# Patient Record
Sex: FEMALE | Race: WHITE | NOT HISPANIC OR LATINO | Employment: UNEMPLOYED | URBAN - NONMETROPOLITAN AREA
[De-identification: names, ages, dates, MRNs, and addresses within clinical notes are randomized per-mention and may not be internally consistent; named-entity substitution may affect disease eponyms.]

---

## 2017-01-06 NOTE — TELEPHONE ENCOUNTER
----- Message from Kristine Chin sent at 1/6/2017 10:52 AM EST -----  Contact: GERONIMO  PT IS SCHEDULED FOR ANNUAL NEXT WEEK, BUT NEEDS 1 REFILL OF PREMPRO 0.3MG-1.5MG TAB SENT TO [facility].  THIS IS DR CARCAMO'S PT.  THANKS      Routed to Dr Carcamo for approval.

## 2017-01-12 ENCOUNTER — OFFICE VISIT (OUTPATIENT)
Dept: OBSTETRICS AND GYNECOLOGY | Facility: CLINIC | Age: 72
End: 2017-01-12

## 2017-01-12 DIAGNOSIS — I48.19 PERSISTENT ATRIAL FIBRILLATION (HCC): ICD-10-CM

## 2017-01-12 DIAGNOSIS — R00.2 PALPITATIONS: ICD-10-CM

## 2017-02-01 ENCOUNTER — ANTICOAGULATION VISIT (OUTPATIENT)
Dept: CARDIOLOGY | Facility: CLINIC | Age: 72
End: 2017-02-01

## 2017-02-01 LAB — INR PPP: 3.3

## 2017-02-01 NOTE — PATIENT INSTRUCTIONS
Spoke with patient. Instructed to decrease dose to 5mg daily except 2.5mg on T/TH/Sat. Recheck INR in 2 weeks.

## 2017-02-09 ENCOUNTER — OFFICE VISIT (OUTPATIENT)
Dept: OBSTETRICS AND GYNECOLOGY | Facility: CLINIC | Age: 72
End: 2017-02-09

## 2017-02-09 VITALS
BODY MASS INDEX: 31.07 KG/M2 | SYSTOLIC BLOOD PRESSURE: 124 MMHG | WEIGHT: 205 LBS | DIASTOLIC BLOOD PRESSURE: 66 MMHG | HEIGHT: 68 IN

## 2017-02-09 DIAGNOSIS — N95.1 MENOPAUSAL SYMPTOMS: ICD-10-CM

## 2017-02-09 DIAGNOSIS — Z01.419 ENCOUNTER FOR GYNECOLOGICAL EXAMINATION WITHOUT ABNORMAL FINDING: ICD-10-CM

## 2017-02-09 DIAGNOSIS — Z12.31 ENCOUNTER FOR MAMMOGRAM TO ESTABLISH BASELINE MAMMOGRAM: Primary | ICD-10-CM

## 2017-02-09 DIAGNOSIS — N95.2 POSTMENOPAUSAL ATROPHIC VAGINITIS: ICD-10-CM

## 2017-02-09 PROCEDURE — G0101 CA SCREEN;PELVIC/BREAST EXAM: HCPCS | Performed by: OBSTETRICS & GYNECOLOGY

## 2017-02-09 PROCEDURE — 99212 OFFICE O/P EST SF 10 MIN: CPT | Performed by: OBSTETRICS & GYNECOLOGY

## 2017-02-09 RX ORDER — RANITIDINE 150 MG/1
TABLET ORAL
COMMUNITY
Start: 2017-01-23 | End: 2020-01-03

## 2017-02-09 RX ORDER — VENLAFAXINE 100 MG/1
TABLET ORAL 2 TIMES DAILY
COMMUNITY
Start: 2017-01-23 | End: 2020-03-11 | Stop reason: SDUPTHER

## 2017-02-09 RX ORDER — ONABOTULINUMTOXINA 100 [USP'U]/1
INJECTION, POWDER, LYOPHILIZED, FOR SOLUTION INTRADERMAL; INTRAMUSCULAR
COMMUNITY
Start: 2017-01-04 | End: 2020-01-03

## 2017-02-09 RX ORDER — ESTRADIOL 0.1 MG/G
CREAM VAGINAL
Qty: 1 EACH | Refills: 12 | Status: SHIPPED | OUTPATIENT
Start: 2017-02-09 | End: 2018-05-10 | Stop reason: SDUPTHER

## 2017-02-09 NOTE — PROGRESS NOTES
Subjective  Chief Complaint   Patient presents with   • Gynecologic Exam     Millie Richardson is a 71 y.o. year old  menopausal female presenting to be seen for her annual exam.  This past year she has been on hormone replacement therapy.  There has not been vaginal bleeding in the last 12 months.  Menopausal symptoms are not present.  Pt requesting refills of prempro and estrace cream.  Pt aware of risks/complications from HRT.    OTHER COMPLAINTS:  none    Routine Health Maintenance  Last Pap Smear:  10/2014  Last MM years ago  Last Dexa:  never  Last Colonoscopy:  ?    History  Past Medical History   Diagnosis Date   • Anemia    • Anxiety    • Atrial fibrillation    • Blepharospasm    • Depression    • Depression    • History of blood transfusion    • History of myocardial infarction    • History of pneumonia    • History of rheumatic fever    • Hypertension    • Hypothyroidism      recent TSH abnormal hyperthyroid (patient noncompliant with reduced dose of [place] Thyroid).   • Irritable bowel syndrome (IBS)    • Osteoporosis    • Ulcer      Current Outpatient Prescriptions on File Prior to Visit   Medication Sig Dispense Refill   • buPROPion XL (WELLBUTRIN XL) 300 MG 24 hr tablet Take 300 mg by mouth Daily.     • carvedilol (COREG) 25 MG tablet Take 1 tablet by mouth 2 (Two) Times a Day. 60 tablet 11   • estrogen, conjugated,-medroxyprogesterone (PREMPRO) 0.3-1.5 MG per tablet Take  by mouth Daily.     • lisinopril (PRINIVIL,ZESTRIL) 10 MG tablet Take 10 mg by mouth Daily.     • thyroid (ARMOUR THYROID) 300 MG tablet Take 300 mg by mouth Daily.     • warfarin (COUMADIN) 5 MG tablet Take 5 mg by mouth Daily. As directed     • [DISCONTINUED] estrogen, conjugated,-medroxyprogesterone (PREMPRO) 0.3-1.5 MG per tablet Take 1 tablet by mouth Daily. 30 tablet 0   • [DISCONTINUED] venlafaxine (EFFEXOR) 75 MG tablet Take 75 mg by mouth 2 (Two) Times a Day.       No current facility-administered medications on  "file prior to visit.      Allergies   Allergen Reactions   • Novocain [Procaine]    • Penicillins    • Sulfa Antibiotics      Past Surgical History   Procedure Laterality Date   • Cholecystectomy  2001     Family History   Problem Relation Age of Onset   • Colon cancer Father    • Hypertension Father    • Thyroid disease Mother    • Hypertension Mother      Social History     Social History   • Marital status:      Spouse name: N/A   • Number of children: N/A   • Years of education: N/A     Social History Main Topics   • Smoking status: Never Smoker   • Smokeless tobacco: None   • Alcohol use No   • Drug use: No   • Sexual activity: Defer     Other Topics Concern   • None     Social History Narrative       Review of Systems  All systems were reviewed and negative except for:  Constitution:  positive for trouble sleeping and hot flashes  Cardiovascular: positive for  palpitations  Musculoskeletal: positive for  joint pain  Behavioral/Psych: positive for  depression       Objective  Visit Vitals   • /66   • Ht 68\" (172.7 cm)   • Wt 205 lb (93 kg)   • LMP  (LMP Unknown)   • Breastfeeding No   • BMI 31.17 kg/m2     Physical Exam:  General Appearance: alert, appears stated age and cooperative  Head: normocephalic, without obvious abnormality and atraumatic  Eyes: lids and lashes normal, conjunctivae and sclerae normal, no icterus, no pallor and corneas clear  Neck: suppple, trachea midline and no thyromegaly  Breasts: Examined in supine position  Symmetric without masses or skin dimpling  Nipples normal without inversion, lesions or discharge  There are no palpable axillary nodes  Abdomen: normal bowel sounds, no masses, no hepatomegaly, no splenomegaly, soft non-tender, no guarding and no rebound tenderness  Pelvic: Clinical staff was present for exam  External genitalia:  normal appearance of the external genitalia including Bartholin's and Makena's glands.  :  urethral meatus normal; urethral " hypermobility is absent.  Vaginal:  normal pink mucosa without prolapse or lesions.  Cervix:  normal appearance.  Uterus:  normal size, shape and consistency.  Adnexa:  normal bimanual exam of the adnexa.  Extremities: moves extremities well, no edema, no cyanosis and no redness  Skin: no bleeding, bruising or rash and no leasions noted  Neurologic: Mental Status orientated to person, place, time and situation  Psych: normal       Assessment/Plan   Millie was seen today for gynecologic exam.    Diagnoses and all orders for this visit:    Encounter for mammogram to establish baseline mammogram  -     Mammo Screening Digital Tomosynthesis Bilateral With CAD; Future      Encounter for gynecological examination without abnormal finding    Pap IG, Rfx HPV ASCU  Menopausal symptoms    Pt desiring to cont HRT.  Pt aware of risks, complications.  Rx given as noted.        Follow up 1 year for annual exam         This note was electronically signed.  Rebekah Crowe M.D.  February 9, 2017

## 2017-02-09 NOTE — PATIENT INSTRUCTIONS
Mammogram  A mammogram is an X-ray of the breasts that is done to check for changes that are not normal. This test can screen for and find any changes that may suggest breast cancer. This test can also help to find other changes and variations in the breast.  BEFORE THE PROCEDURE  · Have this test done about 1-2 weeks after your period. This is usually when your breasts are the least tender.  · If you are visiting a new doctor or clinic, send any past mammogram images to your new doctor's office.  · Wash your breasts and under your arms the day of the test.  · Do not use deodorants, perfumes, lotions, or powders on the day of the test.  · Take off any jewelry from your neck.  · Wear clothes that you can change into and out of easily.  PROCEDURE  · You will undress from the waist up. You will put on a gown.  · You will  front of the X-ray machine.  · Each breast will be placed between two plastic or glass plates. The plates will press down on your breast for a few seconds. Try to stay as relaxed as possible. This does not cause any harm to your breasts. Any discomfort you feel will be very brief.  · X-rays will be taken from different angles of each breast.  The procedure may vary among doctors and hospitals.   AFTER THE PROCEDURE  · The mammogram will be looked at by a specialist (radiologist).  · You may need to do certain parts of the test again. This depends on the quality of the images.  · Ask when your test results will be ready. Make sure you get your test results.  · You may go back to your normal activities.     This information is not intended to replace advice given to you by your health care provider. Make sure you discuss any questions you have with your health care provider.     Document Released: 03/16/2010 Document Revised: 12/06/2012 Document Reviewed: 02/26/2016  Elsevier Interactive Patient Education ©2016 [employer]

## 2017-02-16 NOTE — TELEPHONE ENCOUNTER
----- Message from Kristine Chin sent at 2/16/2017 11:26 AM EST -----  Contact: PT  PT SAW DR CARCAMO AND GOT RX REFILLS, BUT THE PREMPRO WAS ONLY A 1 MONTH SUPPLY.  CAN WE SEND IN REFILLS FOR HER?  THANKS

## 2017-03-14 ENCOUNTER — ANTICOAGULATION VISIT (OUTPATIENT)
Dept: CARDIOLOGY | Facility: CLINIC | Age: 72
End: 2017-03-14

## 2017-03-14 ENCOUNTER — OFFICE VISIT (OUTPATIENT)
Dept: CARDIOLOGY | Facility: CLINIC | Age: 72
End: 2017-03-14

## 2017-03-14 VITALS
SYSTOLIC BLOOD PRESSURE: 132 MMHG | BODY MASS INDEX: 31.52 KG/M2 | HEIGHT: 68 IN | DIASTOLIC BLOOD PRESSURE: 72 MMHG | WEIGHT: 208 LBS | HEART RATE: 63 BPM

## 2017-03-14 DIAGNOSIS — I48.19 PERSISTENT ATRIAL FIBRILLATION (HCC): Primary | ICD-10-CM

## 2017-03-14 DIAGNOSIS — I10 ESSENTIAL HYPERTENSION: ICD-10-CM

## 2017-03-14 DIAGNOSIS — E03.9 HYPOTHYROIDISM, UNSPECIFIED TYPE: ICD-10-CM

## 2017-03-14 DIAGNOSIS — E05.80 IATROGENIC HYPERTHYROIDISM: ICD-10-CM

## 2017-03-14 LAB — INR PPP: 1.6 (ref 0.9–1.1)

## 2017-03-14 PROCEDURE — 85610 PROTHROMBIN TIME: CPT | Performed by: INTERNAL MEDICINE

## 2017-03-14 PROCEDURE — 99212 OFFICE O/P EST SF 10 MIN: CPT | Performed by: INTERNAL MEDICINE

## 2017-03-14 NOTE — PROGRESS NOTES
PCP:  Dr. Lou Gonzalez MD      Chief Complaint: Persistent Afib    History of Present Illness:    The stable established chief complaint is being appropriately managed with the current medical regimen, is minimal to asymptomatic with treatment, and random in occurrence when happening.    Problem   Iatrogenic Hyperthyroidism   Atrial Fibrillation    a. Diagnosed in 1999, initially treated with Betapace, chronic Coumadin therapy.  b. Status post successful external cardioversion September 2003.    c. Echocardiogram, 08/27/2003:  Mild concentric LVH, LA 4.6, normal EF.  d. Holter monitor, 10/10/2003:  Heart rate 60 to 124 beats per minute, average 78 beats per minute with PACs, PVCs, and sinus rhythm.  e. Successful external cardioversion after Tikosyn initiation, 03/31/2004.  f. Recent persistent atrial fibrillation/documented per hospital visit at [facility] November 2015.   g. 02/23/2016: EKG reveals atrial fibrillation with heart rate 102 beats per minute.-  h. Holter 2/17 average heart rate 90 with chronic afib and occasional pvc's       Hypothyroidism    recent TSH abnormal hyperthyroid (patient noncompliant with reduced dose of [place] Thyroid).   Iatrogenic hyperthroidism            Current Outpatient Prescriptions:   •  BOTOX 100 UNITS reconstituted solution injection, , Disp: , Rfl:   •  buPROPion XL (WELLBUTRIN XL) 300 MG 24 hr tablet, Take 300 mg by mouth Daily., Disp: , Rfl:   •  carvedilol (COREG) 25 MG tablet, Take 1 tablet by mouth 2 (Two) Times a Day., Disp: 60 tablet, Rfl: 11  •  estradiol (ESTRACE VAGINAL) 0.1 MG/GM vaginal cream, Insert 1 gm intravaginally 1-3 times each week, Disp: 1 each, Rfl: 12  •  estrogen, conjugated,-medroxyprogesterone (PREMPRO) 0.3-1.5 MG per tablet, Take  by mouth Daily., Disp: , Rfl:   •  estrogen, conjugated,-medroxyprogesterone (PREMPRO) 0.3-1.5 MG per tablet, Take 1 tablet by mouth Daily., Disp: 30 tablet, Rfl: 11  •  lisinopril (PRINIVIL,ZESTRIL) 10 MG tablet,  "Take 10 mg by mouth Daily., Disp: , Rfl:   •  raNITIdine (ZANTAC) 150 MG tablet, , Disp: , Rfl:   •  thyroid (ARMOUR THYROID) 300 MG tablet, Take 300 mg by mouth Daily., Disp: , Rfl:   •  venlafaxine (EFFEXOR) 100 MG tablet, , Disp: , Rfl:   •  warfarin (COUMADIN) 5 MG tablet, Take 5 mg by mouth Daily. As directed, Disp: , Rfl:    Allergies   Allergen Reactions   • Novocain [Procaine]    • Penicillins    • Sulfa Antibiotics         ROS:    Denies chest pain, tightness, palpitations, KEYS, PND, or edema      Visit Vitals   • /72 (BP Location: Right arm, Patient Position: Sitting)   • Pulse 63   • Ht 68\" (172.7 cm)   • Wt 208 lb (94.3 kg)   • LMP  (LMP Unknown)   • BMI 31.63 kg/m2        Physical Exam:    Lungs: CTA, no wheezing, equal air entry bilaterally, resonant to percussion  Cor: IRIR, physiologic S1, S2, no rubs, gallops, murmurs or thrills  Ext: warm, negative edema    Procedures      Diagnosis Plan   1. Persistent atrial fibrillation  The patient feels overall she is doing well in current dose of rate control medications and her 24 Holter monitor suggest appropriate rate control of Afib with average heart rate 90's.  Discussed with patient how iatrogenic hyperthyroidism may affect ability to control heart rate in setting of Afib. However, she feels worse with lower dose of [place] thyroid and is reluctant to change current dose.  She will follow up in one year or sooner as needed.     2. Essential hypertension     3. Hypothyroidism, unspecified type     4. Iatrogenic hyperthyroidism      Will Alejandro lopesibe for Dr. Davis  "

## 2017-03-29 ENCOUNTER — ANTICOAGULATION VISIT (OUTPATIENT)
Dept: CARDIOLOGY | Facility: CLINIC | Age: 72
End: 2017-03-29

## 2017-03-29 LAB — INR PPP: 3.3

## 2017-04-13 ENCOUNTER — ANTICOAGULATION VISIT (OUTPATIENT)
Dept: CARDIOLOGY | Facility: CLINIC | Age: 72
End: 2017-04-13

## 2017-04-13 LAB — INR PPP: 1.6

## 2017-04-19 DIAGNOSIS — I48.19 PERSISTENT ATRIAL FIBRILLATION (HCC): Primary | ICD-10-CM

## 2017-04-21 ENCOUNTER — RESULTS ENCOUNTER (OUTPATIENT)
Dept: CARDIOLOGY | Facility: CLINIC | Age: 72
End: 2017-04-21

## 2017-04-21 DIAGNOSIS — I48.19 PERSISTENT ATRIAL FIBRILLATION (HCC): ICD-10-CM

## 2017-04-28 ENCOUNTER — RESULTS ENCOUNTER (OUTPATIENT)
Dept: CARDIOLOGY | Facility: CLINIC | Age: 72
End: 2017-04-28

## 2017-04-28 DIAGNOSIS — I48.19 PERSISTENT ATRIAL FIBRILLATION (HCC): ICD-10-CM

## 2017-05-05 ENCOUNTER — RESULTS ENCOUNTER (OUTPATIENT)
Dept: CARDIOLOGY | Facility: CLINIC | Age: 72
End: 2017-05-05

## 2017-05-05 DIAGNOSIS — I48.19 PERSISTENT ATRIAL FIBRILLATION (HCC): ICD-10-CM

## 2017-05-12 ENCOUNTER — RESULTS ENCOUNTER (OUTPATIENT)
Dept: CARDIOLOGY | Facility: CLINIC | Age: 72
End: 2017-05-12

## 2017-05-12 ENCOUNTER — ANTICOAGULATION VISIT (OUTPATIENT)
Dept: CARDIOLOGY | Facility: CLINIC | Age: 72
End: 2017-05-12

## 2017-05-12 DIAGNOSIS — I48.19 PERSISTENT ATRIAL FIBRILLATION (HCC): ICD-10-CM

## 2017-05-12 LAB — INR PPP: 2.1

## 2017-05-19 ENCOUNTER — RESULTS ENCOUNTER (OUTPATIENT)
Dept: CARDIOLOGY | Facility: CLINIC | Age: 72
End: 2017-05-19

## 2017-05-19 DIAGNOSIS — I48.19 PERSISTENT ATRIAL FIBRILLATION (HCC): ICD-10-CM

## 2017-05-26 ENCOUNTER — RESULTS ENCOUNTER (OUTPATIENT)
Dept: CARDIOLOGY | Facility: CLINIC | Age: 72
End: 2017-05-26

## 2017-05-26 DIAGNOSIS — I48.19 PERSISTENT ATRIAL FIBRILLATION (HCC): ICD-10-CM

## 2017-06-02 ENCOUNTER — RESULTS ENCOUNTER (OUTPATIENT)
Dept: CARDIOLOGY | Facility: CLINIC | Age: 72
End: 2017-06-02

## 2017-06-02 DIAGNOSIS — I48.19 PERSISTENT ATRIAL FIBRILLATION (HCC): ICD-10-CM

## 2017-06-09 ENCOUNTER — ANTICOAGULATION VISIT (OUTPATIENT)
Dept: CARDIOLOGY | Facility: CLINIC | Age: 72
End: 2017-06-09

## 2017-06-09 ENCOUNTER — RESULTS ENCOUNTER (OUTPATIENT)
Dept: CARDIOLOGY | Facility: CLINIC | Age: 72
End: 2017-06-09

## 2017-06-09 DIAGNOSIS — I48.19 PERSISTENT ATRIAL FIBRILLATION (HCC): ICD-10-CM

## 2017-06-09 LAB — INR PPP: 3.6

## 2017-06-16 ENCOUNTER — ANTICOAGULATION VISIT (OUTPATIENT)
Dept: CARDIOLOGY | Facility: CLINIC | Age: 72
End: 2017-06-16

## 2017-06-16 ENCOUNTER — RESULTS ENCOUNTER (OUTPATIENT)
Dept: CARDIOLOGY | Facility: CLINIC | Age: 72
End: 2017-06-16

## 2017-06-16 DIAGNOSIS — I48.19 PERSISTENT ATRIAL FIBRILLATION (HCC): ICD-10-CM

## 2017-06-16 LAB — INR PPP: 3.7

## 2017-06-16 NOTE — PATIENT INSTRUCTIONS
LM on VM with new dose instructions- I asked that she call back Monday and let us know what dose she has been taking, just so we can be sure that she made the changes after last check- I tried to call several times throughout the day but finally had to leave a msg at [phone] - will await her return phone call Monday

## 2017-06-23 ENCOUNTER — RESULTS ENCOUNTER (OUTPATIENT)
Dept: CARDIOLOGY | Facility: CLINIC | Age: 72
End: 2017-06-23

## 2017-06-23 DIAGNOSIS — I48.19 PERSISTENT ATRIAL FIBRILLATION (HCC): ICD-10-CM

## 2017-06-29 NOTE — PROGRESS NOTES
I have reviewed the anticoagulation track, labs, and dose adjustments made by Janny Reinoso and I agree.    Electronically signed by Rachna Moralez PA-C 06/29/17 9:05 AM

## 2017-06-30 ENCOUNTER — RESULTS ENCOUNTER (OUTPATIENT)
Dept: CARDIOLOGY | Facility: CLINIC | Age: 72
End: 2017-06-30

## 2017-06-30 DIAGNOSIS — I48.19 PERSISTENT ATRIAL FIBRILLATION (HCC): ICD-10-CM

## 2017-07-05 ENCOUNTER — ANTICOAGULATION VISIT (OUTPATIENT)
Dept: CARDIOLOGY | Facility: CLINIC | Age: 72
End: 2017-07-05

## 2017-07-05 LAB — INR PPP: 1.3

## 2017-07-07 ENCOUNTER — RESULTS ENCOUNTER (OUTPATIENT)
Dept: CARDIOLOGY | Facility: CLINIC | Age: 72
End: 2017-07-07

## 2017-07-07 DIAGNOSIS — I48.19 PERSISTENT ATRIAL FIBRILLATION (HCC): ICD-10-CM

## 2017-07-14 ENCOUNTER — RESULTS ENCOUNTER (OUTPATIENT)
Dept: CARDIOLOGY | Facility: CLINIC | Age: 72
End: 2017-07-14

## 2017-07-14 ENCOUNTER — ANTICOAGULATION VISIT (OUTPATIENT)
Dept: CARDIOLOGY | Facility: CLINIC | Age: 72
End: 2017-07-14

## 2017-07-14 DIAGNOSIS — I48.19 PERSISTENT ATRIAL FIBRILLATION (HCC): ICD-10-CM

## 2017-07-14 LAB — INR PPP: 1.9

## 2017-07-21 ENCOUNTER — RESULTS ENCOUNTER (OUTPATIENT)
Dept: CARDIOLOGY | Facility: CLINIC | Age: 72
End: 2017-07-21

## 2017-07-21 DIAGNOSIS — I48.19 PERSISTENT ATRIAL FIBRILLATION (HCC): ICD-10-CM

## 2017-07-28 ENCOUNTER — RESULTS ENCOUNTER (OUTPATIENT)
Dept: CARDIOLOGY | Facility: CLINIC | Age: 72
End: 2017-07-28

## 2017-07-28 ENCOUNTER — ANTICOAGULATION VISIT (OUTPATIENT)
Dept: CARDIOLOGY | Facility: CLINIC | Age: 72
End: 2017-07-28

## 2017-07-28 DIAGNOSIS — I48.19 PERSISTENT ATRIAL FIBRILLATION (HCC): ICD-10-CM

## 2017-07-28 LAB — INR PPP: 2.3

## 2017-08-04 ENCOUNTER — RESULTS ENCOUNTER (OUTPATIENT)
Dept: CARDIOLOGY | Facility: CLINIC | Age: 72
End: 2017-08-04

## 2017-08-04 DIAGNOSIS — I48.19 PERSISTENT ATRIAL FIBRILLATION (HCC): ICD-10-CM

## 2017-08-11 ENCOUNTER — RESULTS ENCOUNTER (OUTPATIENT)
Dept: CARDIOLOGY | Facility: CLINIC | Age: 72
End: 2017-08-11

## 2017-08-11 DIAGNOSIS — I48.19 PERSISTENT ATRIAL FIBRILLATION (HCC): ICD-10-CM

## 2017-08-18 ENCOUNTER — ANTICOAGULATION VISIT (OUTPATIENT)
Dept: CARDIOLOGY | Facility: CLINIC | Age: 72
End: 2017-08-18

## 2017-08-18 ENCOUNTER — RESULTS ENCOUNTER (OUTPATIENT)
Dept: CARDIOLOGY | Facility: CLINIC | Age: 72
End: 2017-08-18

## 2017-08-18 DIAGNOSIS — I48.19 PERSISTENT ATRIAL FIBRILLATION (HCC): ICD-10-CM

## 2017-08-18 LAB — INR PPP: 2

## 2017-08-18 NOTE — PATIENT INSTRUCTIONS
To recheck 4 weeks with no change.   Patient says that she is going to start having her PCP regulate her Warfarin.   AH

## 2017-08-25 ENCOUNTER — RESULTS ENCOUNTER (OUTPATIENT)
Dept: CARDIOLOGY | Facility: CLINIC | Age: 72
End: 2017-08-25

## 2017-08-25 DIAGNOSIS — I48.19 PERSISTENT ATRIAL FIBRILLATION (HCC): ICD-10-CM

## 2017-09-01 ENCOUNTER — RESULTS ENCOUNTER (OUTPATIENT)
Dept: CARDIOLOGY | Facility: CLINIC | Age: 72
End: 2017-09-01

## 2017-09-01 DIAGNOSIS — I48.19 PERSISTENT ATRIAL FIBRILLATION (HCC): ICD-10-CM

## 2017-09-08 ENCOUNTER — RESULTS ENCOUNTER (OUTPATIENT)
Dept: CARDIOLOGY | Facility: CLINIC | Age: 72
End: 2017-09-08

## 2017-09-08 DIAGNOSIS — I48.19 PERSISTENT ATRIAL FIBRILLATION (HCC): ICD-10-CM

## 2017-09-15 ENCOUNTER — RESULTS ENCOUNTER (OUTPATIENT)
Dept: CARDIOLOGY | Facility: CLINIC | Age: 72
End: 2017-09-15

## 2017-09-15 DIAGNOSIS — I48.19 PERSISTENT ATRIAL FIBRILLATION (HCC): ICD-10-CM

## 2018-03-26 ENCOUNTER — TELEPHONE (OUTPATIENT)
Dept: OBSTETRICS AND GYNECOLOGY | Facility: CLINIC | Age: 73
End: 2018-03-26

## 2018-03-26 NOTE — TELEPHONE ENCOUNTER
----- Message from Kristine Chin sent at 3/26/2018  2:09 PM EDT -----  Contact: PT  THIS IS DR CARCAMO'S PT.  SHE IS SCHEDULED FOR YEARLY F/U IN MAY.  PLEASE SEND 2 REFILLS OF PREMPRO 0.3-1.5MG DAILY TO [facility].  THANKS

## 2018-05-09 ENCOUNTER — HOSPITAL ENCOUNTER (EMERGENCY)
Facility: HOSPITAL | Age: 73
Discharge: HOME | End: 2018-05-09
Attending: EMERGENCY MEDICINE | Admitting: EMERGENCY MEDICINE

## 2018-05-09 VITALS
WEIGHT: 200 LBS | OXYGEN SATURATION: 97 % | SYSTOLIC BLOOD PRESSURE: 180 MMHG | HEART RATE: 102 BPM | DIASTOLIC BLOOD PRESSURE: 113 MMHG | RESPIRATION RATE: 20 BRPM | TEMPERATURE: 98.5 F | BODY MASS INDEX: 34.15 KG/M2 | HEIGHT: 64 IN

## 2018-05-09 DIAGNOSIS — B02.9 HERPES ZOSTER WITHOUT COMPLICATION: Primary | ICD-10-CM

## 2018-05-09 LAB
ALBUMIN SERPL-MCNC: 4.2 G/DL (ref 3.5–5)
ALBUMIN/GLOB SERPL: 1.3 G/DL (ref 1–2)
ALP SERPL-CCNC: 119 U/L (ref 38–126)
ALT SERPL W P-5'-P-CCNC: 44 U/L (ref 13–69)
ANION GAP SERPL CALCULATED.3IONS-SCNC: 16.6 MMOL/L (ref 10–20)
AST SERPL-CCNC: 25 U/L (ref 15–46)
BASOPHILS # BLD AUTO: 0.02 10*3/MM3 (ref 0–0.2)
BASOPHILS NFR BLD AUTO: 0.3 % (ref 0–2.5)
BILIRUB SERPL-MCNC: 0.7 MG/DL (ref 0.2–1.3)
BUN BLD-MCNC: 10 MG/DL (ref 7–20)
BUN/CREAT SERPL: 25 (ref 7.1–23.5)
CALCIUM SPEC-SCNC: 9.3 MG/DL (ref 8.4–10.2)
CHLORIDE SERPL-SCNC: 102 MMOL/L (ref 98–107)
CO2 SERPL-SCNC: 26 MMOL/L (ref 26–30)
CREAT BLD-MCNC: 0.4 MG/DL (ref 0.6–1.3)
DEPRECATED RDW RBC AUTO: 44.8 FL (ref 37–54)
EOSINOPHIL # BLD AUTO: 0.06 10*3/MM3 (ref 0–0.7)
EOSINOPHIL NFR BLD AUTO: 0.8 % (ref 0–7)
ERYTHROCYTE [DISTWIDTH] IN BLOOD BY AUTOMATED COUNT: 13.6 % (ref 11.5–14.5)
GFR SERPL CREATININE-BSD FRML MDRD: >150 ML/MIN/1.73
GLOBULIN UR ELPH-MCNC: 3.2 GM/DL
GLUCOSE BLD-MCNC: 112 MG/DL (ref 74–98)
HCT VFR BLD AUTO: 42.7 % (ref 37–47)
HGB BLD-MCNC: 14.2 G/DL (ref 12–16)
IMM GRANULOCYTES # BLD: 0.02 10*3/MM3 (ref 0–0.06)
IMM GRANULOCYTES NFR BLD: 0.3 % (ref 0–0.6)
INR PPP: 1.63 (ref 0.9–1.1)
LYMPHOCYTES # BLD AUTO: 1.93 10*3/MM3 (ref 0.6–3.4)
LYMPHOCYTES NFR BLD AUTO: 27 % (ref 10–50)
MCH RBC QN AUTO: 30.2 PG (ref 27–31)
MCHC RBC AUTO-ENTMCNC: 33.3 G/DL (ref 30–37)
MCV RBC AUTO: 90.9 FL (ref 81–99)
MONOCYTES # BLD AUTO: 0.68 10*3/MM3 (ref 0–0.9)
MONOCYTES NFR BLD AUTO: 9.5 % (ref 0–12)
NEUTROPHILS # BLD AUTO: 4.43 10*3/MM3 (ref 2–6.9)
NEUTROPHILS NFR BLD AUTO: 62.1 % (ref 37–80)
NRBC BLD MANUAL-RTO: 0 /100 WBC (ref 0–0)
PLATELET # BLD AUTO: 251 10*3/MM3 (ref 130–400)
PMV BLD AUTO: 10.3 FL (ref 6–12)
POTASSIUM BLD-SCNC: 3.6 MMOL/L (ref 3.5–5.1)
PROT SERPL-MCNC: 7.4 G/DL (ref 6.3–8.2)
PROTHROMBIN TIME: 18 SECONDS (ref 9.3–12.1)
RBC # BLD AUTO: 4.7 10*6/MM3 (ref 4.2–5.4)
SODIUM BLD-SCNC: 141 MMOL/L (ref 137–145)
TROPONIN I SERPL-MCNC: 0.02 NG/ML (ref 0–0.03)
WBC NRBC COR # BLD: 7.14 10*3/MM3 (ref 4.8–10.8)

## 2018-05-09 PROCEDURE — 80053 COMPREHEN METABOLIC PANEL: CPT | Performed by: PHYSICIAN ASSISTANT

## 2018-05-09 PROCEDURE — 99283 EMERGENCY DEPT VISIT LOW MDM: CPT

## 2018-05-09 PROCEDURE — 84484 ASSAY OF TROPONIN QUANT: CPT | Performed by: PHYSICIAN ASSISTANT

## 2018-05-09 PROCEDURE — 85025 COMPLETE CBC W/AUTO DIFF WBC: CPT | Performed by: PHYSICIAN ASSISTANT

## 2018-05-09 PROCEDURE — 93005 ELECTROCARDIOGRAM TRACING: CPT | Performed by: PHYSICIAN ASSISTANT

## 2018-05-09 PROCEDURE — 85610 PROTHROMBIN TIME: CPT | Performed by: PHYSICIAN ASSISTANT

## 2018-05-09 RX ORDER — LIDOCAINE 50 MG/G
1 PATCH TOPICAL EVERY 24 HOURS
Qty: 6 PATCH | Refills: 0 | Status: SHIPPED | OUTPATIENT
Start: 2018-05-09 | End: 2020-01-03

## 2018-05-09 NOTE — ED PROVIDER NOTES
Subjective   72-year-old female was brought in initially as a possible STEMI, EKG was reviewed by cardiologist Dr. Mcdaniel STEMI was canceled.  She states that she has pain on the right side of her abdomen and has a rash that is been present for 1 month she was diagnosed with shingles.  She states that she had sharp pain that came on earlier today, she states that she had symptoms are heart racing and felt short of breath but that has subsided.  This all started from the sharp pain that she had on the right side of her abdomen where she has the shingles rash        History provided by:  Patient   used: No        Review of Systems   Cardiovascular: Positive for palpitations.   Skin: Positive for rash.   All other systems reviewed and are negative.      Past Medical History:   Diagnosis Date   • Anemia    • Anxiety    • Atrial fibrillation    • Blepharospasm    • Depression    • Depression    • History of blood transfusion    • History of myocardial infarction 1999   • History of pneumonia    • History of rheumatic fever    • Hypertension    • Hypothyroidism     recent TSH abnormal hyperthyroid (patient noncompliant with reduced dose of [place] Thyroid).   • Irritable bowel syndrome (IBS)    • Osteoporosis    • Ulcer        Allergies   Allergen Reactions   • Novocain [Procaine]    • Penicillins    • Sulfa Antibiotics        Past Surgical History:   Procedure Laterality Date   • CHOLECYSTECTOMY  2001       Family History   Problem Relation Age of Onset   • Colon cancer Father    • Hypertension Father    • Thyroid disease Mother    • Hypertension Mother        Social History     Social History   • Marital status:      Social History Main Topics   • Smoking status: Never Smoker   • Alcohol use No   • Drug use: No   • Sexual activity: Defer     Other Topics Concern   • Not on file           Objective   Physical Exam   Constitutional: She is oriented to person, place, and time. She appears  well-developed and well-nourished.   Neck: Normal range of motion. Neck supple.   Cardiovascular: Normal rate and regular rhythm.    Pulmonary/Chest: Effort normal and breath sounds normal.   Abdominal: Soft. Bowel sounds are normal.   Musculoskeletal: Normal range of motion.   Neurological: She is alert and oriented to person, place, and time. She has normal reflexes.   Skin: Skin is warm and dry.   Rash on right upper abdomen, erythematous with dried vesicles   Psychiatric: She has a normal mood and affect.   Nursing note and vitals reviewed.      Procedures           ED Course  ED Course   Comment By Time   Patient continues support pain is along the rash on her chest at the site of shingles Williams Staton Jr., PA-C 05/09 1935                  [facility]      Final diagnoses:   Herpes zoster without complication            Williams Staton Jr., PA-C  05/09/18 1936

## 2018-05-10 ENCOUNTER — TELEPHONE (OUTPATIENT)
Dept: OBSTETRICS AND GYNECOLOGY | Facility: CLINIC | Age: 73
End: 2018-05-10

## 2018-05-10 RX ORDER — ESTRADIOL 0.1 MG/G
CREAM VAGINAL
Qty: 1 EACH | Refills: 0 | Status: SHIPPED | OUTPATIENT
Start: 2018-05-10 | End: 2020-01-03

## 2018-05-10 NOTE — TELEPHONE ENCOUNTER
----- Message from Kristine Chin sent at 5/10/2018  2:52 PM EDT -----  Contact: PT  THIS IS DR CARCAMO'S PT.  SHE IS SCHEDULED FOR APPT ON 5/30/18.  PLEASE SEND REFILL OF PREMARIN CREAM TO [facility].  THANKS

## 2018-05-11 ENCOUNTER — TELEPHONE (OUTPATIENT)
Dept: OBSTETRICS AND GYNECOLOGY | Facility: CLINIC | Age: 73
End: 2018-05-11

## 2018-05-11 NOTE — TELEPHONE ENCOUNTER
----- Message from Kristine Chin sent at 5/11/2018  2:17 PM EDT -----  Contact: PT  THIS IS DR CARCAMO'S PT.  SHE CALLED NEEDING REFILL ON PREMPRO .3MG TABLET.  SHE USES [facility].  THANKS

## 2018-06-14 ENCOUNTER — OFFICE VISIT (OUTPATIENT)
Dept: OBSTETRICS AND GYNECOLOGY | Facility: CLINIC | Age: 73
End: 2018-06-14

## 2018-06-14 VITALS
HEIGHT: 64 IN | SYSTOLIC BLOOD PRESSURE: 138 MMHG | DIASTOLIC BLOOD PRESSURE: 76 MMHG | WEIGHT: 197 LBS | BODY MASS INDEX: 33.63 KG/M2

## 2018-06-14 DIAGNOSIS — Z12.31 ENCOUNTER FOR SCREENING MAMMOGRAM FOR BREAST CANCER: Primary | ICD-10-CM

## 2018-06-14 DIAGNOSIS — N95.1 MENOPAUSAL SYMPTOMS: ICD-10-CM

## 2018-06-14 PROCEDURE — 99213 OFFICE O/P EST LOW 20 MIN: CPT | Performed by: OBSTETRICS & GYNECOLOGY

## 2018-06-14 NOTE — PROGRESS NOTES
Subjective  Chief Complaint   Patient presents with   • Gynecologic Exam     DOESN'T WANT EXAM TODAY, NEEDS REFILL ON PREMPRO.      Patient is 72 y.o.  here for f/u HRT.  Pt has continued on prempro low.  Pt denies any vaginal bleeding.  Pt reports doing well as long as she takes medication.  Pt reports having menopausal symptoms if she misses pill.  Pt has appt with Dr. Buckner at  Cardiology.  Pt is on an antiplatelet now; off coumadin secondary to afib.  Pt has never had colonoscopy.  Pt does do hemoccult stools usually yearly.  Pt unsure regarding last MMG but thinks she had it done last year.    History  Past Medical History:   Diagnosis Date   • Anemia    • Anxiety    • Atrial fibrillation    • Blepharospasm    • Depression    • Depression    • History of blood transfusion    • History of myocardial infarction    • History of pneumonia    • History of rheumatic fever    • Hypertension    • Hypothyroidism     recent TSH abnormal hyperthyroid (patient noncompliant with reduced dose of [place] Thyroid).   • Irritable bowel syndrome (IBS)    • Osteoporosis    • Ulcer      Current Outpatient Prescriptions on File Prior to Visit   Medication Sig Dispense Refill   • BOTOX 100 UNITS reconstituted solution injection      • buPROPion XL (WELLBUTRIN XL) 300 MG 24 hr tablet Take 300 mg by mouth Daily.     • carvedilol (COREG) 25 MG tablet Take 1 tablet by mouth 2 (Two) Times a Day. 60 tablet 11   • estradiol (ESTRACE VAGINAL) 0.1 MG/GM vaginal cream Insert 1 gm intravaginally 1-3 times each week 1 each 0   • lidocaine (LIDODERM) 5 % Place 1 patch on the skin Daily. Remove & Discard patch within 12 hours or as directed by MD 6 patch 0   • lisinopril (PRINIVIL,ZESTRIL) 10 MG tablet Take 10 mg by mouth Daily.     • raNITIdine (ZANTAC) 150 MG tablet      • thyroid (ARMOUR THYROID) 300 MG tablet Take 300 mg by mouth Daily.     • venlafaxine (EFFEXOR) 100 MG tablet      • [DISCONTINUED] estrogen,  "conjugated,-medroxyprogesterone (PREMPRO) 0.3-1.5 MG per tablet Take 1 tablet by mouth Daily. 30 tablet 0   • [DISCONTINUED] warfarin (COUMADIN) 5 MG tablet Take 5 mg by mouth Daily. As directed       No current facility-administered medications on file prior to visit.      Allergies   Allergen Reactions   • Novocain [Procaine]    • Penicillins    • Sulfa Antibiotics      Past Surgical History:   Procedure Laterality Date   • CHOLECYSTECTOMY  2001     Family History   Problem Relation Age of Onset   • Colon cancer Father    • Hypertension Father    • Thyroid disease Mother    • Hypertension Mother      Social History     Social History   • Marital status:      Social History Main Topics   • Smoking status: Never Smoker   • Smokeless tobacco: Never Used   • Alcohol use No   • Drug use: No   • Sexual activity: Defer     Other Topics Concern   • Not on file     Review of Systems  The following systems were reviewed and negative:  constitution, eyes, ENT, respiratory, cardiovascular, gastrointestinal, genitourinary, integument, breast, hematologic / lymphatic, musculoskeletal, neurological, behavioral/psych, endocrine and allergies / immunologic     Objective  Vitals:    06/14/18 1441   BP: 138/76   Weight: 89.4 kg (197 lb)   Height: 162.6 cm (64\")     Physical Exam:  General Appearance: alert, appears stated age and cooperative  Head: normocephalic, without obvious abnormality and atraumatic  Eyes: lids and lashes normal, conjunctivae and sclerae normal, no icterus, no pallor, corneas clear and PERRLA  Ears: ears appear intact with no abnormalities noted  Nose: nares normal, septum midline, mucosa normal and no drainage  Neck: suppple, trachea midline and no thyromegaly  Lungs: clear to auscultation, respirations regular, respirations even and respirations unlabored  Heart: regular rhythm and normal rate, normal S1, S2, no murmur, gallop, or rubs and no click  Breasts: Not performed.  Abdomen: normal bowel " sounds, no masses, no hepatomegaly, no splenomegaly, soft non-tender, no guarding and no rebound tenderness  Pelvic: Not performed.  Extremities: moves extremities well, no edema, no cyanosis and no redness  Skin: no bleeding, bruising or rash and no lesions noted  Lymph Nodes: no palpable adenopathy  Neuro: CN II-X grossly intact; sensation intact  Psych: normal mood and affect, oriented to person, time and place, thought content organized and appropriate judgment  Lab Review   No data reviewed    Imaging   Mammogram report    Assessment/Plan  Problem List Items Addressed This Visit     None      Visit Diagnoses     Encounter for screening mammogram for breast cancer    -  Primary  It is recommended per ACOG, for women at average risk to start annual mammogram screening at the age of 40 until the age of 75 and an individualized decision be made for women after age 75.  She was encouraged to continue getting yearly mammograms.  She should report any palpable breast lump(s) or skin changes regardless of mammographic findings.  I explained to Millie that notification regarding her mammogram results will come from the center performing the study.  Our office will not be routinely calling with mammogram results.  It is her responsibility to make sure that the results from the mammogram are communicated to her by the breast center.  If she has any questions about the results, she is welcome to call our office anytime.  Pt informed regarding increased risk of breast cancer per the WHI study with prempro.  Pt encouraged to get MMG; pt voices understanding.  Order given.    Relevant Orders    Mammo Screening Digital Tomosynthesis Bilateral With CAD    Menopausal symptoms      The various options for the management of menopausal symptoms was discussed.  The medical treatment options discussed include HRT, SSRIs, SSNRIs, clonidine, and gabapentin.  The risks and benefits were discussed including the findings from the WHI study.   The increased risk of breast cancer, CAD, stroke, and VTE events were discussed for combination therapy vs the increased risk of CV events and breast cancer not being seen in the estrogen only group.  The lowest effective dose for the shortest duration of treatment was discussed in regards to HRT.  Other alternatives including otc supplements and lifestyle changes were also discussed.  Local estrogen therapy to relieve atrophic vaginal symptoms was discussed was well as other alternatives.  Rx given as noted.  Pt also to discuss with cardiologist.  Instructions and precautions given.    Relevant Medications    estrogen, conjugated,-medroxyprogesterone (PREMPRO) 0.3-1.5 MG per tablet        Follow up 1 year or prn   This note was electronically signed.  Rebekah Crowe M.D.

## 2019-06-19 DIAGNOSIS — N95.1 MENOPAUSAL SYMPTOMS: ICD-10-CM

## 2019-06-19 RX ORDER — ESTROGEN,CON/M-PROGEST ACET 0.3-1.5MG
TABLET ORAL
Qty: 90 TABLET | Refills: 0 | Status: SHIPPED | OUTPATIENT
Start: 2019-06-19 | End: 2019-08-12 | Stop reason: SDUPTHER

## 2019-06-28 ENCOUNTER — OFFICE VISIT (OUTPATIENT)
Dept: PULMONOLOGY | Facility: CLINIC | Age: 74
End: 2019-06-28

## 2019-06-28 VITALS
OXYGEN SATURATION: 98 % | BODY MASS INDEX: 27.58 KG/M2 | RESPIRATION RATE: 16 BRPM | SYSTOLIC BLOOD PRESSURE: 118 MMHG | DIASTOLIC BLOOD PRESSURE: 82 MMHG | HEIGHT: 68 IN | WEIGHT: 182 LBS | HEART RATE: 72 BPM

## 2019-06-28 DIAGNOSIS — R06.83 SNORING: ICD-10-CM

## 2019-06-28 DIAGNOSIS — G47.19 EXCESSIVE DAYTIME SLEEPINESS: ICD-10-CM

## 2019-06-28 DIAGNOSIS — F51.5 NIGHTMARE DISORDER: ICD-10-CM

## 2019-06-28 DIAGNOSIS — R06.02 SHORTNESS OF BREATH: Primary | ICD-10-CM

## 2019-06-28 DIAGNOSIS — G47.33 OBSTRUCTIVE SLEEP APNEA: ICD-10-CM

## 2019-06-28 DIAGNOSIS — I27.20 PULMONARY HYPERTENSION (HCC): ICD-10-CM

## 2019-06-28 PROCEDURE — 99204 OFFICE O/P NEW MOD 45 MIN: CPT | Performed by: INTERNAL MEDICINE

## 2019-06-28 RX ORDER — LISINOPRIL 30 MG/1
TABLET ORAL
COMMUNITY
Start: 2019-05-23 | End: 2020-01-03

## 2019-06-28 RX ORDER — APIXABAN 5 MG/1
5 TABLET, FILM COATED ORAL EVERY 12 HOURS SCHEDULED
COMMUNITY
Start: 2019-06-03 | End: 2020-07-09 | Stop reason: SDUPTHER

## 2019-06-28 RX ORDER — LEVOTHYROXINE SODIUM 0.1 MG/1
TABLET ORAL
COMMUNITY
Start: 2019-04-06 | End: 2020-01-03

## 2019-06-28 RX ORDER — METOPROLOL SUCCINATE 100 MG/1
TABLET, EXTENDED RELEASE ORAL
COMMUNITY
Start: 2019-06-03 | End: 2020-01-03

## 2019-06-28 RX ORDER — AMITRIPTYLINE HYDROCHLORIDE 25 MG/1
25 TABLET, FILM COATED ORAL NIGHTLY
COMMUNITY
Start: 2019-06-17 | End: 2020-11-30 | Stop reason: SDUPTHER

## 2019-06-28 RX ORDER — HYDROCODONE BITARTRATE AND ACETAMINOPHEN 5; 325 MG/1; MG/1
TABLET ORAL
COMMUNITY
Start: 2019-03-26 | End: 2020-01-03

## 2019-06-28 RX ORDER — POTASSIUM CHLORIDE 20 MEQ/1
20 TABLET, EXTENDED RELEASE ORAL DAILY
COMMUNITY
Start: 2019-05-21 | End: 2020-02-17 | Stop reason: SDUPTHER

## 2019-06-28 RX ORDER — PANTOPRAZOLE SODIUM 40 MG/1
40 TABLET, DELAYED RELEASE ORAL DAILY
COMMUNITY
Start: 2019-06-11 | End: 2020-05-12 | Stop reason: SDUPTHER

## 2019-06-28 NOTE — PROGRESS NOTES
CONSULT NOTE    Requested by:   Yasmin Matta DO Drenkhahn Stephens, Erin A, MD      Chief Complaint   Patient presents with   • Consult   • Breathing Problem     Subjective:  Millie Richardson is a 73 y.o. female.     History of Present Illness   Patient comes in today for consultation because of occasional shortness of breath, mostly with exertion.    The patient actually had pneumonia back in September and was admitted to the hospital.  She reports having at least one more episode of pneumonia last year but since then she has not had any further episodes.    The patient is a non-smoker.  She denies any shortness of breath with change of seasons.    Upon questioning, she is complaining of sleep disturbance. Patient says that for the past few years, she has had trouble with snoring.     Patient says that she feels somewhat tired in the morning after waking up. she is also complaining of occasionally feeling sleepy while reading a book.    Patient also mentions occasional nights when she remembers having nightmares and has woken up due to the same.      The patient has been told that she occasionally talks in her sleep.     she is not complaining of occasional headaches.    Patient's sleep schedule was reviewed. she drinks 2-3 cups/cans of caffeinated drinks per day.     she does not have a family history of TANIA . Patient is under management for hypertension.    She was placed on oxygen at night for a few years, but is not on it anymore.     The following portions of the patient's history were reviewed and updated as appropriate: allergies, current medications, past family history, past medical history, past social history and past surgical history.    Review of Systems   Constitutional: Negative for chills, fatigue and fever.   HENT: Negative for sinus pressure, sneezing and sore throat.    Respiratory: Positive for cough and shortness of breath. Negative for chest tightness and wheezing.    Cardiovascular: Positive  "for leg swelling. Negative for palpitations.   Psychiatric/Behavioral: Positive for sleep disturbance.   All other systems reviewed and are negative.      Past Medical History:   Diagnosis Date   • Anemia    • Anxiety    • Atrial fibrillation (CMS/HCC)    • Blepharospasm    • Depression    • Depression    • History of blood transfusion    • History of myocardial infarction 1999   • History of pneumonia    • History of rheumatic fever    • Hypertension    • Hypothyroidism     recent TSH abnormal hyperthyroid (patient noncompliant with reduced dose of [place] Thyroid).   • Irritable bowel syndrome (IBS)    • Osteoporosis    • Ulcer        Social History     Tobacco Use   • Smoking status: Never Smoker   • Smokeless tobacco: Never Used   Substance Use Topics   • Alcohol use: No         Objective:  Visit Vitals  /82   Pulse 72   Resp 16   Ht 172.7 cm (68\")   Wt 82.6 kg (182 lb)   LMP  (LMP Unknown)   SpO2 98%   BMI 27.67 kg/m²       Physical Exam   Constitutional: She is oriented to person, place, and time. She appears well-developed and well-nourished.   HENT:   Head: Atraumatic.   Crowded Oropharynx.   Missing teeth noted.    Eyes: EOM are normal. Pupils are equal, round, and reactive to light.   Neck: No JVD present. No tracheal deviation present. No thyromegaly present.   Increased adipose tissue.    Cardiovascular: Normal rate.   Irregular rhythm.    Pulmonary/Chest: Effort normal and breath sounds normal. No respiratory distress. She has no wheezes.   Musculoskeletal: Normal range of motion. She exhibits no edema.   Gait was normal.   Neurological: She is alert and oriented to person, place, and time.   Skin: Skin is warm and dry.   Psychiatric: She has a normal mood and affect. Her behavior is normal.   Vitals reviewed.      Assessment/Plan:  Millie was seen today for consult and breathing problem.    Diagnoses and all orders for this visit:    Shortness of breath  -     Pulmonary Function Test; " Future    Pulmonary hypertension (CMS/HCC)  -     Polysomnography 4 or More Parameters; Future    Snoring  -     Polysomnography 4 or More Parameters; Future    Nightmare disorder  -     Polysomnography 4 or More Parameters; Future    Obstructive sleep apnea  -     Polysomnography 4 or More Parameters; Future    Excessive daytime sleepiness  -     Polysomnography 4 or More Parameters; Future        Return in about 10 weeks (around 9/6/2019) for Recheck, PFT on F/Up, To be seen in [place], Give pt sleep questionairre, Sleep study, For Lucero.    DISCUSSION(if any):  I have reviewed the patient's imaging studies and shared them with her. The last CXR showed LLL infiltrate.     I will obtain her last echo from her cardiologist, Dr. Buckner, at [facility]    Reviewed last PCP note. Mentions pulmonary hypertension.  It also mentions sleep disturbance and occasional choking episodes.    ===========================  ===========================    PFTs were ordered.    Laboratory workup was also reviewed which showed   Lab Results   Component Value Date    EOSABS 0.06 05/09/2018     Laboratory workup was also reviewed which showed   Lab Results   Component Value Date    CO2 26.0 05/09/2018     ===========================  ===========================    Sleep questionnaire was provided to the patient    The pathophysiology of sleep apnea was discussed, with her.     We will encourage her to schedule the sleep study soon.     The patient will definitely need to be considered for an in lab study due to nightmares & previous nighttime oxygen use among other reasons.  It should be noted that a home sleep study is likely to underestimate the true AHI and is unable to assess sleep stages and abnormal sleep behaviors etc. The patient has understood.    The patient is agreeable to try CPAP/BiPAP, if needed.     Patient was educated on good sleep hygiene measures and voiced understanding of the same.     Patient was given reading material  regarding sleep apnea.    We will order PFTs due to shortness of breath and possibility of pulmonary hypertension, as per PCP note.       Dictated utilizing Dragon dictation.    This document was electronically signed by Sheeba Shepherd MD on 06/28/19 at 11:35 AM

## 2019-07-24 ENCOUNTER — HOSPITAL ENCOUNTER (OUTPATIENT)
Dept: SLEEP MEDICINE | Facility: HOSPITAL | Age: 74
Setting detail: THERAPIES SERIES
End: 2019-07-24

## 2019-07-24 DIAGNOSIS — R06.83 SNORING: ICD-10-CM

## 2019-07-24 DIAGNOSIS — G47.19 EXCESSIVE DAYTIME SLEEPINESS: ICD-10-CM

## 2019-07-24 DIAGNOSIS — I27.20 PULMONARY HYPERTENSION (HCC): ICD-10-CM

## 2019-07-24 DIAGNOSIS — F51.5 NIGHTMARE DISORDER: ICD-10-CM

## 2019-07-24 DIAGNOSIS — G47.33 OBSTRUCTIVE SLEEP APNEA: ICD-10-CM

## 2019-07-24 PROCEDURE — 95810 POLYSOM 6/> YRS 4/> PARAM: CPT

## 2019-07-24 PROCEDURE — 95810 POLYSOM 6/> YRS 4/> PARAM: CPT | Performed by: INTERNAL MEDICINE

## 2019-07-30 DIAGNOSIS — G47.34 NOCTURNAL HYPOXEMIA: Primary | ICD-10-CM

## 2019-08-12 ENCOUNTER — TELEPHONE (OUTPATIENT)
Dept: OBSTETRICS AND GYNECOLOGY | Facility: CLINIC | Age: 74
End: 2019-08-12

## 2019-08-12 DIAGNOSIS — N95.1 MENOPAUSAL SYMPTOMS: ICD-10-CM

## 2019-09-13 DIAGNOSIS — G47.34 NOCTURNAL HYPOXEMIA: ICD-10-CM

## 2019-09-18 ENCOUNTER — OFFICE VISIT (OUTPATIENT)
Dept: PULMONOLOGY | Facility: CLINIC | Age: 74
End: 2019-09-18

## 2019-09-18 VITALS
RESPIRATION RATE: 18 BRPM | HEIGHT: 68 IN | OXYGEN SATURATION: 95 % | DIASTOLIC BLOOD PRESSURE: 90 MMHG | BODY MASS INDEX: 32.13 KG/M2 | HEART RATE: 66 BPM | SYSTOLIC BLOOD PRESSURE: 150 MMHG | WEIGHT: 212 LBS

## 2019-09-18 DIAGNOSIS — G47.33 OBSTRUCTIVE SLEEP APNEA: Primary | ICD-10-CM

## 2019-09-18 DIAGNOSIS — R06.02 SHORTNESS OF BREATH: ICD-10-CM

## 2019-09-18 DIAGNOSIS — G47.19 EXCESSIVE DAYTIME SLEEPINESS: ICD-10-CM

## 2019-09-18 PROCEDURE — 94726 PLETHYSMOGRAPHY LUNG VOLUMES: CPT | Performed by: INTERNAL MEDICINE

## 2019-09-18 PROCEDURE — 99214 OFFICE O/P EST MOD 30 MIN: CPT | Performed by: NURSE PRACTITIONER

## 2019-09-18 PROCEDURE — 94729 DIFFUSING CAPACITY: CPT | Performed by: INTERNAL MEDICINE

## 2019-09-18 PROCEDURE — 94060 EVALUATION OF WHEEZING: CPT | Performed by: INTERNAL MEDICINE

## 2019-09-18 RX ORDER — THYROID 180 MG
TABLET ORAL
COMMUNITY
Start: 2019-08-27 | End: 2020-01-03

## 2019-09-18 RX ORDER — LISINOPRIL 40 MG/1
40 TABLET ORAL DAILY
COMMUNITY
Start: 2019-08-26 | End: 2020-05-12 | Stop reason: SDUPTHER

## 2019-09-18 RX ORDER — METOPROLOL SUCCINATE 200 MG/1
200 TABLET, EXTENDED RELEASE ORAL DAILY
COMMUNITY
Start: 2019-08-26 | End: 2020-05-12 | Stop reason: SDUPTHER

## 2019-09-18 RX ORDER — THYROID,PORK 90 MG
TABLET ORAL
COMMUNITY
Start: 2019-08-03 | End: 2020-01-03

## 2019-09-18 NOTE — PROGRESS NOTES
"Chief Complaint   Patient presents with   • Follow-up   • Shortness of Breath         Subjective   Millie Richardson is a 73 y.o. female.     History of Present Illness   The patient comes in today for follow-up of obstructive sleep apnea and shortness of breath.    I reviewed the patient's sleep study and discussed the results with her today.  The sleep study revealed moderate obstructive sleep apnea with an apnea hypopnea index of 16/h.  There was some event related hypoxia noted during the sleep study.    According to chart notes the patient refused CPAP therapy and asked to be tested to see if she needed oxygen.    The patient states she has not been set up with CPAP therapy because she did not understand that she needed a CPAP machine.  When asked if she was called with the results of her sleep study she states yes.  However upon discussing the reason for needing a CPAP machine and reviewing the sleep study results she is still hesitant to begin CPAP therapy.    She does report shortness of breath with walking.    I reviewed her overnight pulse oximetry.  The overnight pulse oximetry did not show the need for oxygen at night however there is some artifact in the waveform and this should have been repeated however it does not appear that it has been.    The following portions of the patient's history were reviewed and updated as appropriate: allergies, current medications, past family history, past medical history, past social history and past surgical history.    Review of Systems   Constitutional: Negative for chills and fever.   HENT: Positive for rhinorrhea. Negative for sinus pressure.    Respiratory: Positive for cough, shortness of breath and wheezing.    Psychiatric/Behavioral: Positive for sleep disturbance.       Objective   Visit Vitals  /90   Pulse 66   Resp 18   Ht 172.7 cm (67.99\")   Wt 96.2 kg (212 lb)   LMP  (LMP Unknown)   SpO2 95%   BMI 32.24 kg/m²     Physical Exam   Constitutional: She is " oriented to person, place, and time. She appears well-developed and well-nourished.   HENT:   Head: Atraumatic.   Eyes: EOM are normal.   Neck: Neck supple.   Cardiovascular: Normal rate and regular rhythm.   Pulmonary/Chest: Effort normal. No respiratory distress.   Somewhat decreased A/E without wheezing noted.   Musculoskeletal: She exhibits no edema.   Neurological: She is alert and oriented to person, place, and time.   Skin: Skin is warm and dry.   Psychiatric: She has a normal mood and affect.   Vitals reviewed.          Assessment/Plan   Millie was seen today for follow-up and shortness of breath.    Diagnoses and all orders for this visit:    Obstructive sleep apnea    Excessive daytime sleepiness    Shortness of breath  -     Converted Six Minute Walk           Return in about 5 months (around 2/18/2020) for Recheck, For Dr. Shepherd.    DISCUSSION (if any):  Upon initiating a 6-minute walk test, she became unsteady on her feet and stated she was unable to walk and was dizzy.  Upon further questioning she states she has atrial fibrillation.  I mentioned that she should go to the emergency room for an evaluation and she refused.    I manually checked her heart rate and did not notice any irregularity.  She was slightly tachycardic with a pulse of 100.    Once she felt better, I asked if I could reassess her heart rate and oxygen saturation with walking and she states no she is feeling fine and is ready to go.    She does not want to initiate CPAP therapy.  I explained that she has moderate obstructive sleep apnea.  I discussed how this can affect other health issues such as atrial fibrillation.     I reviewed her PFT results which shows mild obstruction.  There is no air trapping.  She does have a decreased diffusion capacity.    We can repeat an overnight pulse oximetry in the future if the patient is willing.    Dictated utilizing Dragon dictation.    This document was electronically signed by Lucero Meredith  APRN September 30, 2019  3:18 PM

## 2019-11-01 DIAGNOSIS — N95.1 MENOPAUSAL SYMPTOMS: ICD-10-CM

## 2019-11-01 RX ORDER — ESTROGEN,CON/M-PROGEST ACET 0.3-1.5MG
TABLET ORAL
Qty: 84 TABLET | Refills: 0 | Status: SHIPPED | OUTPATIENT
Start: 2019-11-01 | End: 2020-01-15 | Stop reason: SDUPTHER

## 2020-01-03 ENCOUNTER — CONSULT (OUTPATIENT)
Dept: CARDIOLOGY | Facility: CLINIC | Age: 75
End: 2020-01-03

## 2020-01-03 VITALS
HEART RATE: 73 BPM | SYSTOLIC BLOOD PRESSURE: 162 MMHG | HEIGHT: 68 IN | OXYGEN SATURATION: 98 % | DIASTOLIC BLOOD PRESSURE: 74 MMHG | WEIGHT: 204 LBS | BODY MASS INDEX: 30.92 KG/M2

## 2020-01-03 DIAGNOSIS — I10 ESSENTIAL HYPERTENSION: ICD-10-CM

## 2020-01-03 DIAGNOSIS — I48.19 ATRIAL FIBRILLATION, PERSISTENT (HCC): Primary | ICD-10-CM

## 2020-01-03 DIAGNOSIS — I51.89 DIASTOLIC DYSFUNCTION: ICD-10-CM

## 2020-01-03 DIAGNOSIS — I27.20 PULMONARY HYPERTENSION (HCC): ICD-10-CM

## 2020-01-03 PROCEDURE — 99204 OFFICE O/P NEW MOD 45 MIN: CPT | Performed by: INTERNAL MEDICINE

## 2020-01-03 PROCEDURE — 93000 ELECTROCARDIOGRAM COMPLETE: CPT | Performed by: INTERNAL MEDICINE

## 2020-01-03 RX ORDER — AMLODIPINE BESYLATE 5 MG/1
5 TABLET ORAL DAILY
Qty: 90 TABLET | Refills: 3 | Status: SHIPPED | OUTPATIENT
Start: 2020-01-03 | End: 2021-04-21 | Stop reason: SDUPTHER

## 2020-01-03 RX ORDER — AMLODIPINE BESYLATE 5 MG/1
TABLET ORAL
COMMUNITY
Start: 2019-12-02 | End: 2020-01-03

## 2020-01-03 RX ORDER — NYSTATIN 100000 [USP'U]/G
POWDER TOPICAL
Refills: 0 | COMMUNITY
Start: 2019-10-09 | End: 2020-01-15

## 2020-01-03 NOTE — PROGRESS NOTES
[facility] OP Consult Note    Millie Richardson  [id number]  01/03/2020    Referred By: Lynn Dykes APRN    Chief Complaint: Shortness of breath    History of Present Illness:   Mrs. Millie Richardson is a 74 y.o. female who presents to the Cardiology Clinic for further management of persistent atrial fibrillation.  The patient has a past medical history sniffing for hypothyroidism, hypertension, and depression.  The patient has a past cardiac history significant for persistent atrial fibrillation, status post prior cardioversion in 2003 and 2004.  She has previously been on Tikosyn for rhythm control, however had recurrent atrial fibrillation at which time the Dickason was discontinued.  She has subsequently been on a rate control method with metoprolol and anticoagulated with Eliquis.  On review of her most recent echocardiogram from 9/18, the patient appeared to have severe pulmonary hypertension with an RVSP of 71 mmHg, in the setting of restrictive LV filling pattern and mild concentric LVH.  Her LVEF was preserved at 55-60%, and she had moderate TR as well as moderate MR.  She presents to the cardiology clinic today primarily to reestablish primary cardiac care closer to her home.  Her primary complaint at this time is exertional dyspnea.  The patient reports a long, several year history of exertional dyspnea which limits her daily activities.  At this time, the patient reports she can barely ambulate from the parking lot to this office due to her dyspnea.  The patient reports in the past she like to go on walks for exercise, however she has been able to do so for many years due to her exertional shortness of breath.  She denies any chest pain or exertional chest discomfort.  In terms of her atrial fibrillation, she reports her rate has always been well controlled.  She ports tolerating Eliquis for anticoagulation well, without any significant bleeding or bruising events.  She denies any  episodes of palpitations.  No presyncopal or syncopal events.  She denies orthopnea, PND, or significant lower extremity edema.  In terms of patient's hypertension, she reports a long history of hypertension which is uncontrolled on home BP checks.    Past Cardiac Testin. Last Coronary Angio: None  2. Prior Stress Testing: None  3. Last Echo: 2018   1.  Normal left ventricular systolic function, LVEF 55-60%   2.  Restrictive LV filling pattern   3.  Mild concentric LVH   4.  Moderate mitral regurgitation   5.  Moderate tricuspid regurgitation   6.  Severe pulmonary hypertension, RVSP 71 mmHg  4. Prior Holter Monitor: None    Review of Systems:   Review of Systems   Constitutional: Negative for activity change, appetite change, chills, diaphoresis, fatigue, fever, unexpected weight gain and unexpected weight loss.   Respiratory: Positive for shortness of breath. Negative for cough, chest tightness and wheezing.    Cardiovascular: Negative for chest pain, palpitations and leg swelling.   Gastrointestinal: Negative for abdominal pain, anal bleeding, blood in stool and GERD.   Endocrine: Negative for cold intolerance and heat intolerance.   Genitourinary: Negative for hematuria.   Neurological: Negative for dizziness, syncope, weakness and light-headedness.   Hematological: Does not bruise/bleed easily.   Psychiatric/Behavioral: Negative for depressed mood and stress. The patient is not nervous/anxious.        Past Medical History:   Past Medical History:   Diagnosis Date   • Anemia    • Anxiety    • Arthritis    • Atrial fibrillation (CMS/HCC)    • Blepharospasm    • Depression    • Depression    • Gall stones    • Heart murmur    • History of blood transfusion    • History of myocardial infarction    • History of pneumonia    • History of rheumatic fever    • Hypertension    • Hypothyroidism     recent TSH abnormal hyperthyroid (patient noncompliant with reduced dose of [place] Thyroid).   • Irritable  bowel syndrome (IBS)    • Migraines    • Myocardial infarction (CMS/HCC)    • Osteoporosis    • Ulcer        Past Surgical History:   Past Surgical History:   Procedure Laterality Date   • CHOLECYSTECTOMY  2001       Family History:   Family History   Problem Relation Age of Onset   • Colon cancer Father    • Hypertension Father    • Arthritis Father    • Heart attack Father    • Migraines Father    • Thyroid disease Mother    • Hypertension Mother    • Mental illness Mother    • Obesity Mother    • Cancer Brother    • Mental illness Brother        Social History:   Social History     Socioeconomic History   • Marital status:      Spouse name: Not on file   • Number of children: Not on file   • Years of education: Not on file   • Highest education level: Not on file   Tobacco Use   • Smoking status: Never Smoker   • Smokeless tobacco: Never Used   Substance and Sexual Activity   • Alcohol use: No   • Drug use: No   • Sexual activity: Defer       Medications:     Current Outpatient Medications:   •  amitriptyline (ELAVIL) 25 MG tablet, , Disp: , Rfl:   •  buPROPion XL (WELLBUTRIN XL) 300 MG 24 hr tablet, Take 300 mg by mouth Daily., Disp: , Rfl:   •  ELIQUIS 5 MG tablet tablet, , Disp: , Rfl:   •  esomeprazole (nexIUM) 20 MG capsule, , Disp: , Rfl:   •  estrogen, conjugated,-medroxyprogesterone (PREMPRO) 0.3-1.5 MG per tablet, Take 1 tablet by mouth Daily., Disp: 90 tablet, Rfl: 0  •  lisinopril (PRINIVIL,ZESTRIL) 40 MG tablet, , Disp: , Rfl:   •  metoprolol succinate XL (TOPROL-XL) 200 MG 24 hr tablet, , Disp: , Rfl:   •  NYSTATIN [id number] UNIT/GM powder, APPLY TO THE AFFECTED AREA(S) TWICE DAILY AS DIRECTED, Disp: , Rfl: 0  •  pantoprazole (PROTONIX) 40 MG EC tablet, , Disp: , Rfl:   •  potassium chloride (K-DUR,KLOR-CON) 20 MEQ CR tablet, , Disp: , Rfl:   •  PREMPRO 0.3-1.5 MG per tablet, TAKE ONE TABLET BY MOUTH ONCE DAILY, Disp: 84 tablet, Rfl: 0  •  thyroid (ARMOUR THYROID) 300 MG tablet, Take 300 mg by  "mouth Daily., Disp: , Rfl:   •  venlafaxine (EFFEXOR) 100 MG tablet, Take  by mouth 2 (Two) Times a Day., Disp: , Rfl:   •  amLODIPine (NORVASC) 5 MG tablet, Take 1 tablet by mouth Daily., Disp: 90 tablet, Rfl: 3    Allergies:   Allergies   Allergen Reactions   • Novocain [Procaine]    • Penicillins    • Sulfa Antibiotics        Physical Exam:  Vital Signs:   Vitals:    01/03/20 1046 01/03/20 1102 01/03/20 1104   BP: 178/80 160/70 162/74   BP Location: Right arm Left arm Left arm   Patient Position: Sitting Sitting Standing   Cuff Size: Adult Adult Adult   Pulse: 73     SpO2: 98%     Weight: 92.5 kg (204 lb)     Height: 172.7 cm (67.99\")         Physical Exam   Constitutional: She is oriented to person, place, and time. She appears well-developed and well-nourished. No distress.   HENT:   Head: Normocephalic and atraumatic.   Moist Mucous Membranes.    Eyes: Pupils are equal, round, and reactive to light. EOM are normal. No scleral icterus.   Neck: No tracheal deviation present.   Cardiovascular: Normal rate, regular rhythm, normal heart sounds and intact distal pulses. Exam reveals no gallop and no friction rub.   No significant peripheral edema.  Soft systolic murmur over the left sternal border.   Pulmonary/Chest: Effort normal and breath sounds normal. No stridor. No respiratory distress. She has no wheezes. She has no rales. She exhibits no tenderness.   CTA bilaterally.   Abdominal: Soft. Bowel sounds are normal. She exhibits no distension. There is no tenderness. There is no rebound and no guarding.   Obese abdomen.   Musculoskeletal: Normal range of motion. She exhibits no edema.   Lymphadenopathy:     She has no cervical adenopathy.   Neurological: She is alert and oriented to person, place, and time.   Skin: Skin is warm and dry. She is not diaphoretic. No erythema.   Psychiatric: She has a normal mood and affect. Her behavior is normal.       Results Review:   I reviewed the patient's new clinical " results.      ECG 12 Lead  Date/Time: 1/3/2020 11:38 AM  Performed by: Martínez Cintron MD  Authorized by: Martínez Cintron MD   Comparison: not compared with previous ECG   Rhythm: atrial fibrillation  Rate: normal  QRS axis: left  Other findings: non-specific ST-T wave changes and left ventricular hypertrophy            Assessment / Plan:     1.  Atrial fibrillation, persistent  --Long history of atrial fibrillation, now persistent atrial fibrillation  --Previously failed Tikosyn, status post prior cardioversion 2003 in 2004  --Remains rate controlled today  --Continue metoprolol for rate control  --Continue Eliquis for CVA prophylaxis    2.  Exertional dyspnea  --Long history of exertional dyspnea  --Recent PFTs show mild obstruction, no restriction, moderate decrease in diffusion capacity  --Echocardiogram in 2018 shows a restrictive diastolic filling pattern in the setting of concentric LVH, and severe pulmonary hypertension with RVSP 71 mmHg  --Suspect diastolic dysfunction with elevated left atrial pressures likely contributing to pulmonary hypertension and exertional dyspnea  --Will attempt to optimize BP control, as below, and reevaluate symptoms as well as repeat echocardiogram once BP control is improved.  If no improvement in pulmonary pressures despite improvement in BP, may then consider right heart catheterization to further evaluate underlying pulmonary hypertension and etiology for exertional dyspnea    3.  Essential hypertension  --BP remains uncontrolled today with systolic BP in 160s to 170s  --Will start Norvasc 5 mg p.o. daily, uptitrate to 10 mg if required for improved BP control  --If no improvement in BP with addition and up titration of Norvasc, would then consider evaluation for secondary causes of hypertension as well as consider adding Aldactone    4.  Pulmonary hypertension   --Severe pulmonary hypertension with RVSP 71 mmHg noted on last echocardiogram  --Will consider right heart  catheterization to further evaluate, following BP control and optimization of diastolic dysfunction          Preventative Cardiology:   Tobacco Cessation: N/A  Obstructive Sleep Apnea Screening: Completed  AAA Screening: N/A  Peripheral Arterial Disease Screening: N/A    Follow Up:   Return in about 6 months (around 7/3/2020).      Thank you for allowing me to participate in the care of your patient. Please to not hesitate to contact me with additional questions or concerns.     EFREN Cintron MD  Interventional Cardiology   01/03/2020  11:01 AM

## 2020-01-15 ENCOUNTER — RESULTS ENCOUNTER (OUTPATIENT)
Dept: FAMILY MEDICINE CLINIC | Facility: CLINIC | Age: 75
End: 2020-01-15

## 2020-01-15 ENCOUNTER — OFFICE VISIT (OUTPATIENT)
Dept: FAMILY MEDICINE CLINIC | Facility: CLINIC | Age: 75
End: 2020-01-15

## 2020-01-15 VITALS
TEMPERATURE: 97.9 F | WEIGHT: 207 LBS | SYSTOLIC BLOOD PRESSURE: 142 MMHG | DIASTOLIC BLOOD PRESSURE: 84 MMHG | OXYGEN SATURATION: 98 % | HEIGHT: 68 IN | BODY MASS INDEX: 31.37 KG/M2 | HEART RATE: 64 BPM

## 2020-01-15 DIAGNOSIS — K21.9 GASTROESOPHAGEAL REFLUX DISEASE, ESOPHAGITIS PRESENCE NOT SPECIFIED: ICD-10-CM

## 2020-01-15 DIAGNOSIS — Z51.81 MEDICATION MONITORING ENCOUNTER: ICD-10-CM

## 2020-01-15 DIAGNOSIS — E03.9 HYPOTHYROIDISM, UNSPECIFIED TYPE: ICD-10-CM

## 2020-01-15 DIAGNOSIS — I48.19 ATRIAL FIBRILLATION, PERSISTENT (HCC): ICD-10-CM

## 2020-01-15 DIAGNOSIS — I10 ESSENTIAL HYPERTENSION: ICD-10-CM

## 2020-01-15 DIAGNOSIS — I48.19 ATRIAL FIBRILLATION, PERSISTENT (HCC): Primary | ICD-10-CM

## 2020-01-15 DIAGNOSIS — F33.9 MAJOR DEPRESSION, RECURRENT, CHRONIC (HCC): ICD-10-CM

## 2020-01-15 DIAGNOSIS — L98.9 FACIAL LESION: ICD-10-CM

## 2020-01-15 DIAGNOSIS — R79.9 ABNORMAL FINDING OF BLOOD CHEMISTRY, UNSPECIFIED: ICD-10-CM

## 2020-01-15 DIAGNOSIS — D64.9 CHRONIC ANEMIA: ICD-10-CM

## 2020-01-15 PROCEDURE — 99204 OFFICE O/P NEW MOD 45 MIN: CPT | Performed by: FAMILY MEDICINE

## 2020-01-15 NOTE — PROGRESS NOTES
Subjective   Millie Richardson is a 74 y.o. female.     History of Present Illness   Mrs. Richardson presents today as a new pt to establish care. She was previously followed at [facility]. She has multiple chronic medical problems.     She has A fib for which she is on eliquis for anticoagulation and metoprolol for rate control. Taking as rx'd, denies side effects. No bleeding tendencies.    She has comorbid HTN, on BB as above as well as lisinopril. BP has been fairly well controlled. She denies CP.    She has acquired hypoTH, currently on high dose replacement. Has chronically functioned better with higher levels, suppressed TSH. Reports having thyroid problems as a young child. Under care of last PCP, replacement decreased to normalize TSH, she had acute decompensation and has subsequently restarted high dose. She has also had consultation with endocrinology. Denies h/o nodule/cancer.    She reports having chronic anemia. Unsure of iron or B12 deficiency. Has required blood transfusion in past.    Currently on PPI for heartburn/reflux. No dysphagia, blood in stool, no abd pain.    Has chronic depression for which she is on effexor. Taking as rx'd. Mood stable. On elavil qhs, sleeping fairly well. Denies SI.    The following portions of the patient's history were reviewed and updated as appropriate: allergies, current medications, past family history, past medical history, past social history, past surgical history and problem list.    Review of Systems   Constitutional: Positive for fatigue and unexpected weight change. Negative for appetite change and fever.   HENT: Positive for hearing loss and voice change. Negative for congestion, ear pain, nosebleeds, rhinorrhea, sneezing, sore throat, tinnitus and trouble swallowing.    Eyes: Positive for visual disturbance. Negative for pain, discharge and redness.   Respiratory: Positive for cough, shortness of breath and wheezing. Negative for chest tightness.    Cardiovascular: Positive  for palpitations and leg swelling. Negative for chest pain.   Gastrointestinal: Negative for abdominal pain, blood in stool, constipation, diarrhea, nausea and vomiting.        Heartburn   Endocrine: Negative for cold intolerance, heat intolerance, polydipsia and polyuria.   Genitourinary: Negative for dysuria, flank pain, frequency, hematuria and urgency.   Musculoskeletal: Negative for arthralgias, back pain, joint swelling, myalgias and neck pain.   Skin: Negative for rash and wound.   Neurological: Negative for dizziness, tremors, seizures, syncope, speech difficulty, weakness, numbness and headaches.   Hematological: Negative for adenopathy. Does not bruise/bleed easily.   Psychiatric/Behavioral: Positive for dysphoric mood and sleep disturbance. Negative for confusion and suicidal ideas. The patient is nervous/anxious.        Objective    Vitals:    01/15/20 0935   BP: 142/84   Pulse: 64   Temp: 97.9 °F (36.6 °C)   SpO2: 98%     Body mass index is 31.47 kg/m².      01/15/20  0935   Weight: 93.9 kg (207 lb)       Physical Exam   Constitutional: She is oriented to person, place, and time. She appears well-developed and well-nourished. She is cooperative. She does not appear ill. No distress.   Appears older than stated age   HENT:   Head: Normocephalic and atraumatic.   Right Ear: Tympanic membrane, external ear and ear canal normal. Decreased hearing is noted.   Left Ear: Tympanic membrane, external ear and ear canal normal. Decreased hearing is noted.   Nose: Nose normal. No mucosal edema or rhinorrhea.   Mouth/Throat: Oropharynx is clear and moist and mucous membranes are normal. No oral lesions. Abnormal dentition. Dental caries present. No posterior oropharyngeal erythema.   Eyes: Conjunctivae, EOM and lids are normal. No scleral icterus.   Neck: Phonation normal. Neck supple. Normal carotid pulses present. Carotid bruit is not present. No thyromegaly present.   Cardiovascular: Normal rate, regular rhythm,  S1 normal, S2 normal and intact distal pulses. Exam reveals no gallop.   No murmur heard.  Pulmonary/Chest: Effort normal and breath sounds normal.   Abdominal: Soft. Bowel sounds are normal. She exhibits no distension and no mass. There is no hepatosplenomegaly. There is no tenderness.   Musculoskeletal: She exhibits no edema, tenderness or deformity.     Vascular Status -  Her right foot exhibits no edema. Her left foot exhibits no edema.  Lymphadenopathy:     She has no cervical adenopathy.   Neurological: She is alert and oriented to person, place, and time. She has normal strength. She displays no tremor. No cranial nerve deficit or sensory deficit. Gait normal.   Skin: Skin is warm and dry. No ecchymosis and no rash noted. No cyanosis. Nails show no clubbing.   Multiple nonpigmented scaling facial skin lesions, NTTP   Psychiatric: She has a normal mood and affect. Her speech is normal and behavior is normal. Judgment and thought content normal. Cognition and memory are normal.   Good eye contact. Answers questions appropriately. Good personal hygiene and grooming.   Nursing note and vitals reviewed.      Assessment/Plan   Millie was seen today for establish care and med management.    Diagnoses and all orders for this visit:    Atrial fibrillation, persistent  -     CBC (No Diff); Future  -     Comprehensive Metabolic Panel; Future    Essential hypertension  -     CBC (No Diff); Future  -     Comprehensive Metabolic Panel; Future    Hypothyroidism, unspecified type  -     TSH; Future  -     T4, Free; Future  -     T3, Free; Future    Abnormal finding of blood chemistry, unspecified  -     Hemoglobin A1c; Future    Chronic anemia  -     CBC (No Diff); Future  -     Iron; Future  -     Vitamin B12; Future    Medication monitoring encounter  -     CBC (No Diff); Future  -     Comprehensive Metabolic Panel; Future    Facial lesion  -     Ambulatory Referral to Dermatology    Gastroesophageal reflux disease,  esophagitis presence not specified    Major depression, recurrent, chronic (CMS/HCC)       A fib rate controleld on appropriate anticoagulation. Continue eliquis and beta blocker.    HTN not at goal today. Encouraged med complaince with norvasc, metoprolol and lisinopril. Pt advised to eat a heart healthy diet and get regular aerobic exercise.    hypothurpodism  reportedly from childhood. Recheck levels, adjust replacement as indicated.    Assess status of anemia.    GERD controlled on PPI.    MDD stable on effexor and elavil.    Routine f/u in 3 months, sooner as needed/instructed.  I will contact patient regarding test results and provide instructions regarding any necessary changes in plan of care.  Records requested from previous primary provider as well as any consulting physician, admitting hospitals, etc. Further recommendations pending review.  Patient was encouraged to keep me informed of any acute changes, or any new concerning symptoms.  Pt is aware of reasons to seek emergent care.  Patient voiced understanding of all instructions and denied further questions.

## 2020-01-17 PROBLEM — F33.9 MAJOR DEPRESSION, RECURRENT, CHRONIC: Status: ACTIVE | Noted: 2020-01-17

## 2020-01-17 PROBLEM — K21.9 GASTROESOPHAGEAL REFLUX DISEASE: Status: ACTIVE | Noted: 2020-01-17

## 2020-01-31 NOTE — PROGRESS NOTES
"Please inform pt her labs look stable other than her thyroid levels. Her T4 is only slightly high which is fine but her T3 levels are through the roof. We discussed at her visit that she does better when her TSH is not in the \"Normal range\" but regardless of the TSH, her T3 level is just too high. This thins out her bones and puts her at risk for dangerous heart rhythms. I recommend she take 1/2 tablet of her Calvin thyroid and we can recheck at her next apt."

## 2020-02-03 ENCOUNTER — TELEPHONE (OUTPATIENT)
Dept: FAMILY MEDICINE CLINIC | Facility: CLINIC | Age: 75
End: 2020-02-03

## 2020-02-03 RX ORDER — LEVOTHYROXINE AND LIOTHYRONINE 57; 13.5 UG/1; UG/1
90 TABLET ORAL DAILY
Qty: 30 TABLET | Refills: 2 | Status: SHIPPED | OUTPATIENT
Start: 2020-02-03 | End: 2020-04-07

## 2020-02-03 NOTE — TELEPHONE ENCOUNTER
Spoke to Marycarmen, she states that patient has been seeing them twice weekly under old PCP but since patient has switched care they need to update orders. Also will send over Plan of Care if okay with you.

## 2020-02-03 NOTE — TELEPHONE ENCOUNTER
MAYUR STREETER- RN WITH [facility] :   WILL SHE FOLLOW HOME HEALTH ORDERS     [phone]

## 2020-02-04 ENCOUNTER — TELEPHONE (OUTPATIENT)
Dept: OBSTETRICS AND GYNECOLOGY | Facility: CLINIC | Age: 75
End: 2020-02-04

## 2020-02-04 ENCOUNTER — TELEPHONE (OUTPATIENT)
Dept: FAMILY MEDICINE CLINIC | Facility: CLINIC | Age: 75
End: 2020-02-04

## 2020-02-04 DIAGNOSIS — N95.1 MENOPAUSAL SYMPTOMS: ICD-10-CM

## 2020-02-04 NOTE — TELEPHONE ENCOUNTER
Spoke to Marycarmen with home health, she states that patient was very low, states that she doesn't know how long she can go on.     Patient informed Marycarmen that she is not going to do anything to harm herself, Marycarmen did recommend that she go in for a 3 day admit but patient declined, she does not have a gun in the home and said that she has no plan to hurt herself.     Marycarmen states that her thyroid medication is a trigger for her, she was on the same dose for years and firmly believes that the medication makes her feel worse and causes her to lose hair and teeth. Marycarmen said that she fills patient's medication box for her and has been giving her an old dose of thyroid medication but cannot recall dose right now, states that she will start patient on new dose. Marycarmen states that patient is concerned with so many providers changing her dose over the years. She does have an appointment with Dana Friday to discuss, states that patient cannot come in any sooner because of transportation issues.

## 2020-02-04 NOTE — TELEPHONE ENCOUNTER
----- Message from Kristine Chin sent at 2/4/2020 11:10 AM EST -----  Contact: [facility]  PT IS SCHEDULED WITH DR CARCAMO FOR ANNUAL ON 3/20/20.  PLEASE SEND 2 REFILLS OF PREMPRO 0.3-1.5MG TABLET TO [facility].  THANKS

## 2020-02-04 NOTE — TELEPHONE ENCOUNTER
MAYUR FROM [facility] CALLED REQUESTING NURSE TO GIVE HER  CALL ABOUT THE PATIENT.     CALL BACK [phone]

## 2020-02-11 PROBLEM — F41.1 GENERALIZED ANXIETY DISORDER: Status: ACTIVE | Noted: 2020-02-11

## 2020-02-11 PROBLEM — Z79.01 CHRONIC ANTICOAGULATION: Status: ACTIVE | Noted: 2020-02-11

## 2020-02-11 PROCEDURE — G0179 MD RECERTIFICATION HHA PT: HCPCS | Performed by: FAMILY MEDICINE

## 2020-02-17 ENCOUNTER — OUTSIDE FACILITY SERVICE (OUTPATIENT)
Dept: FAMILY MEDICINE CLINIC | Facility: CLINIC | Age: 75
End: 2020-02-17

## 2020-02-17 RX ORDER — POTASSIUM CHLORIDE 20 MEQ/1
20 TABLET, EXTENDED RELEASE ORAL DAILY
Qty: 30 TABLET | Refills: 5 | Status: SHIPPED | OUTPATIENT
Start: 2020-02-17 | End: 2020-11-02 | Stop reason: SDUPTHER

## 2020-02-18 ENCOUNTER — TELEPHONE (OUTPATIENT)
Dept: FAMILY MEDICINE CLINIC | Facility: CLINIC | Age: 75
End: 2020-02-18

## 2020-02-18 RX ORDER — FLUCONAZOLE 150 MG/1
TABLET ORAL
Qty: 4 TABLET | Refills: 0 | Status: SHIPPED | OUTPATIENT
Start: 2020-02-18 | End: 2020-04-10

## 2020-02-18 NOTE — TELEPHONE ENCOUNTER
Marycarmen with Home Health called and stated that the patient has yeast under her left breast still.  Marycarmen stated the topical powder has helped everything except this area.  Marycarmen asked if we could prescribe the patient with Diflucan to jump start healing in this breast.  Mraycarmen left [phone] as her cell to call at anytime.      Please advise.

## 2020-02-19 ENCOUNTER — OFFICE VISIT (OUTPATIENT)
Dept: PULMONOLOGY | Facility: CLINIC | Age: 75
End: 2020-02-19

## 2020-02-19 VITALS
BODY MASS INDEX: 31.37 KG/M2 | RESPIRATION RATE: 18 BRPM | SYSTOLIC BLOOD PRESSURE: 120 MMHG | DIASTOLIC BLOOD PRESSURE: 70 MMHG | HEART RATE: 73 BPM | OXYGEN SATURATION: 97 % | WEIGHT: 207 LBS | HEIGHT: 68 IN

## 2020-02-19 DIAGNOSIS — R06.02 SHORTNESS OF BREATH: Primary | ICD-10-CM

## 2020-02-19 DIAGNOSIS — I27.20 PULMONARY HYPERTENSION (HCC): ICD-10-CM

## 2020-02-19 DIAGNOSIS — G47.33 OBSTRUCTIVE SLEEP APNEA: ICD-10-CM

## 2020-02-19 DIAGNOSIS — R01.1 MURMUR, CARDIAC: ICD-10-CM

## 2020-02-19 PROCEDURE — 99214 OFFICE O/P EST MOD 30 MIN: CPT | Performed by: INTERNAL MEDICINE

## 2020-02-19 PROCEDURE — 94618 PULMONARY STRESS TESTING: CPT | Performed by: INTERNAL MEDICINE

## 2020-02-19 NOTE — PROGRESS NOTES
"Chief Complaint   Patient presents with   • Follow-up   • Shortness of Breath   • Sleeping Problem       Subjective   Millie Richardson is a 74 y.o. female.     History of Present Illness   Patient came in today for evaluation of possible sleep apnea. Patient endorses snoring. she also says that she has been tired and has fatigue during the day, for the past few years.     The patient says that she rarely gets restful night sleep and her quality has diminished considerably. she does have a tendency to feel sleepy while watching TV and reading a book.      she is not complaining of occasional headaches.     The patient also reports continued shortness of breath with exertion but goes on to tell me that she has bilateral leg pain and back pain which limits her ability to walk.        The following portions of the patient's history were reviewed and updated as appropriate: allergies, current medications, past family history, past medical history, past social history and past surgical history.    Review of Systems   HENT: Positive for rhinorrhea. Negative for sinus pressure, sneezing and sore throat.    Respiratory: Positive for shortness of breath and wheezing. Negative for cough.        Objective   Visit Vitals  /70   Pulse 73   Resp 18   Ht 172.7 cm (68\")   Wt 93.9 kg (207 lb)   LMP  (LMP Unknown)   SpO2 97%   BMI 31.47 kg/m²       Physical Exam   Constitutional: She is oriented to person, place, and time. She appears well-developed and well-nourished.   HENT:   Head: Atraumatic.   Crowded Oropharynx.   Missing teeth noted.    Eyes: Pupils are equal, round, and reactive to light. EOM are normal.   Neck: No JVD present. No tracheal deviation present. No thyromegaly present.   Increased adipose tissue.    Cardiovascular: Normal rate.   Murmur (2+/6 ERMIAS) heard.  Irregular rhythm.    Pulmonary/Chest: Effort normal and breath sounds normal. No respiratory distress. She has no wheezes.   Musculoskeletal: Normal range of " "motion. She exhibits no edema.   Gait was normal.   Neurological: She is alert and oriented to person, place, and time.   Skin: Skin is warm and dry.   Psychiatric: She has a normal mood and affect. Her behavior is normal.   Vitals reviewed.      Assessment/Plan   Millie was seen today for follow-up, shortness of breath and sleeping problem.    Diagnoses and all orders for this visit:    Shortness of breath  -     Adult Transthoracic Echo Complete W/ Cont if Necessary Per Protocol; Future  -     Converted Six Minute Walk    Obstructive sleep apnea  -     Converted Six Minute Walk  -     BIPAP / CPAP Adjustment    Pulmonary hypertension (CMS/HCC)  -     Adult Transthoracic Echo Complete W/ Cont if Necessary Per Protocol; Future  -     Converted Six Minute Walk    Murmur, cardiac  -     Adult Transthoracic Echo Complete W/ Cont if Necessary Per Protocol; Future  -     Converted Six Minute Walk           Return in about 7 weeks (around 4/8/2020) for Recheck, Echo results, Overbook, ....Also 4 mths w/ Lucero.    DISCUSSION (if any):  Reviewed Dr. Cintron's last office note from 01/03/2020. \"Will attempt to optimize BP control, as below, and reevaluate symptoms as well as repeat echocardiogram once BP control is improved.  If no improvement in pulmonary pressures despite improvement in BP, may then consider right heart catheterization to further evaluate underlying pulmonary hypertension and etiology for exertional dyspnea\"    Last echo from , done in Oct 2018 showed Pulm HTN. Also showed atrial dilation and valvular abnormalities.     6 minute walk test performed today.  Total distance walked: 180 meters    Oxygen saturation remained at > 96 % during the walk, on room air.     Walk distance was limited as the patient was off balance, reported leg pain and had a slow pace.    I told the patient that she has obstructive sleep apnea based on the sleep study.  The symptoms also suggest the likelihood of obstructive sleep " apnea.    Based on the above, we will start the patient on AutoPap at empiric pressure of 5/15 cm H2O.     I have asked her to call us back, if there are any issues with mask or the device.    Based on symptomatic response, patient may need either a full night titration study or empiric change in her CPAP pressures.     Patient was educated on good sleep hygiene measures and voiced understanding of the same.     Patient was counseled regarding compliance with the machine and appropriate care of mask and device was discussed.    Due to shortness of breath and pulmonary hypertension on the last echo, we will order a repeat Echo.  If the echocardiogram continues to suggest elevated right-sided pressures, she may need to be considered for right heart catheterization.      Dictated utilizing Dragon dictation.    This document was electronically signed by Sheeba Shepherd MD on 02/19/20 at 11:49 AM

## 2020-02-20 ENCOUNTER — TELEPHONE (OUTPATIENT)
Dept: FAMILY MEDICINE CLINIC | Facility: CLINIC | Age: 75
End: 2020-02-20

## 2020-02-28 ENCOUNTER — HOSPITAL ENCOUNTER (OUTPATIENT)
Dept: CARDIOLOGY | Facility: HOSPITAL | Age: 75
Discharge: HOME | End: 2020-02-28
Admitting: INTERNAL MEDICINE

## 2020-02-28 DIAGNOSIS — I27.20 PULMONARY HYPERTENSION (HCC): ICD-10-CM

## 2020-02-28 DIAGNOSIS — R06.02 SHORTNESS OF BREATH: ICD-10-CM

## 2020-02-28 DIAGNOSIS — R01.1 MURMUR, CARDIAC: ICD-10-CM

## 2020-02-28 PROCEDURE — 93306 TTE W/DOPPLER COMPLETE: CPT

## 2020-02-28 PROCEDURE — 93306 TTE W/DOPPLER COMPLETE: CPT | Performed by: INTERNAL MEDICINE

## 2020-03-07 LAB
MAXIMAL PREDICTED HEART RATE: 144 BPM
STRESS TARGET HR: 122 BPM

## 2020-03-11 RX ORDER — VENLAFAXINE 100 MG/1
100 TABLET ORAL 2 TIMES DAILY
Qty: 180 TABLET | Refills: 1 | Status: SHIPPED | OUTPATIENT
Start: 2020-03-11 | End: 2020-07-09 | Stop reason: SDUPTHER

## 2020-03-11 NOTE — TELEPHONE ENCOUNTER
MAYUR WITH HOME HEALTH IS CALLING FOR THE PATIENT TO GET A REFILL FOR    venlafaxine (EFFEXOR) 100 MG tablet    HOME HEALTH CALL BACK [phone]  [facility]

## 2020-03-17 ENCOUNTER — TELEPHONE (OUTPATIENT)
Dept: FAMILY MEDICINE CLINIC | Facility: CLINIC | Age: 75
End: 2020-03-17

## 2020-03-17 NOTE — TELEPHONE ENCOUNTER
[facility] CALLED REQUESTING PT ORDER TO READ EVERY 4 WEEKS INSTEAD OF 2 WEEKS  AND AS NEEDED WITH EVERYTHING THAT IS GOING ON.    PLEASE ADVISE.  CALL BACK: [phone]

## 2020-03-17 NOTE — TELEPHONE ENCOUNTER
Marycarmen Arana, RN is calling from Home Health, she is requesting a call back to speak to a nurse.     She wants to know if she can fill the patient's medication box for 4 weeks instead of 2 weeks, just in case because of the epidemic. She would like a verbal order as soon as possible. She stated she is going to the patient's house today and would like to know before she goes to confirm.     Please call Marycarmen back and advise @[phone]

## 2020-03-18 PROBLEM — I34.0 MODERATE MITRAL REGURGITATION: Status: ACTIVE | Noted: 2020-03-18

## 2020-03-18 PROBLEM — I07.1 MODERATE TRICUSPID REGURGITATION: Status: ACTIVE | Noted: 2020-03-18

## 2020-03-18 PROBLEM — M81.0 AGE-RELATED OSTEOPOROSIS WITHOUT CURRENT PATHOLOGICAL FRACTURE: Status: ACTIVE | Noted: 2020-03-18

## 2020-03-19 NOTE — TELEPHONE ENCOUNTER
MAYUR CALLED BACK IN REGARDS TO THE PT GETTING REFILLS ON MEDICINE. SHE WOULD LIKE TO SEE FOR THE FUTURE IF HER MEDS CAN BE PRE REFILLED BY A  NURSE OR [facility].     PLEASE ADVISE MAYUR [phone]

## 2020-04-06 ENCOUNTER — TELEPHONE (OUTPATIENT)
Dept: FAMILY MEDICINE CLINIC | Facility: CLINIC | Age: 75
End: 2020-04-06

## 2020-04-06 NOTE — TELEPHONE ENCOUNTER
Marycarmen from [facility] is calling to get a verbal to recert the patient and see her every 4 weeks.  She can be reached at [phone]

## 2020-04-07 DIAGNOSIS — N95.1 MENOPAUSAL SYMPTOMS: ICD-10-CM

## 2020-04-07 RX ORDER — LEVOTHYROXINE, LIOTHYRONINE 57; 13.5 UG/1; UG/1
TABLET ORAL
Qty: 30 TABLET | Refills: 2 | Status: SHIPPED | OUTPATIENT
Start: 2020-04-07 | End: 2020-04-20

## 2020-04-08 RX ORDER — ESTROGEN,CON/M-PROGEST ACET 0.3-1.5MG
TABLET ORAL
Qty: 84 TABLET | Refills: 0 | Status: SHIPPED | OUTPATIENT
Start: 2020-04-08 | End: 2020-05-12 | Stop reason: SDUPTHER

## 2020-04-10 ENCOUNTER — TELEPHONE (OUTPATIENT)
Dept: FAMILY MEDICINE CLINIC | Facility: CLINIC | Age: 75
End: 2020-04-10

## 2020-04-10 RX ORDER — FLUCONAZOLE 100 MG/1
100 TABLET ORAL DAILY
Qty: 7 TABLET | Refills: 0 | Status: SHIPPED | OUTPATIENT
Start: 2020-04-10 | End: 2020-04-17

## 2020-04-10 NOTE — TELEPHONE ENCOUNTER
Pt was put on an antibiotic last week from her dentist and she has a developed a yeast infection under left breast. [facility] Marycarmen called to request diflucan to help with symptoms     Marycarmen  [phone]    Pharmacy: [facility]  PH: [phone]  Fax: [phone]

## 2020-04-16 ENCOUNTER — TELEPHONE (OUTPATIENT)
Dept: FAMILY MEDICINE CLINIC | Facility: CLINIC | Age: 75
End: 2020-04-16

## 2020-04-16 DIAGNOSIS — E03.9 HYPOTHYROIDISM, UNSPECIFIED TYPE: Primary | ICD-10-CM

## 2020-04-17 DIAGNOSIS — E03.9 HYPOTHYROIDISM, UNSPECIFIED TYPE: ICD-10-CM

## 2020-04-20 ENCOUNTER — OFFICE VISIT (OUTPATIENT)
Dept: FAMILY MEDICINE CLINIC | Facility: CLINIC | Age: 75
End: 2020-04-20

## 2020-04-20 DIAGNOSIS — I48.19 ATRIAL FIBRILLATION, PERSISTENT (HCC): Primary | ICD-10-CM

## 2020-04-20 DIAGNOSIS — E05.80 IATROGENIC HYPERTHYROIDISM: ICD-10-CM

## 2020-04-20 DIAGNOSIS — R42 VERTIGO: ICD-10-CM

## 2020-04-20 DIAGNOSIS — I10 ESSENTIAL HYPERTENSION: ICD-10-CM

## 2020-04-20 DIAGNOSIS — E03.9 ACQUIRED HYPOTHYROIDISM: ICD-10-CM

## 2020-04-20 DIAGNOSIS — F33.9 MAJOR DEPRESSION, RECURRENT, CHRONIC (HCC): ICD-10-CM

## 2020-04-20 PROCEDURE — G2025 DIS SITE TELE SVCS RHC/FQHC: HCPCS | Performed by: FAMILY MEDICINE

## 2020-04-20 RX ORDER — LEVOTHYROXINE SODIUM 25 MCG
25 TABLET ORAL DAILY
Qty: 30 TABLET | Refills: 2 | Status: SHIPPED | OUTPATIENT
Start: 2020-04-20 | End: 2020-07-09 | Stop reason: SDUPTHER

## 2020-04-20 RX ORDER — LEVOTHYROXINE, LIOTHYRONINE 9.5; 2.25 UG/1; UG/1
15 TABLET ORAL DAILY
Qty: 30 TABLET | Refills: 2 | Status: SHIPPED | OUTPATIENT
Start: 2020-04-20 | End: 2020-07-09 | Stop reason: SDUPTHER

## 2020-04-20 NOTE — PROGRESS NOTES
"Subjective   Millieanuj Richardson is a 74 y.o. female.     In order to limit face-to-face contact and enact \"social distancing\" in light of the COVID-19 outbreaks and in accordance to the recommendations per the CDC, WHO, and our individual department, she requested to be contacted by telephone.  Telephone visit was used to screen the patient for needs and conduct her regularly scheduled follow-up.     COVID-19 screening: specifically denies fever, body aches, cough, difficulty breathing, sore throat, runny nose, recent travel, or known sick exposures.      History of Present Illness  Mrs. Richardson presents today for routine f/u on several chronic med problems.    She has acquired hypothyroidism. Has been taking quite high doses of NP Thyroid for several years. TSH chronically suppressed. She reports she 'feels awful' when thyroid levels are lower. In January was instructed to decrease her NP thyroid by half (45 mg) and have repeat labs. She repeated labs earlier this month and wishes to discuss results. Of note, she has comorbid osteoporosis and a fib.    She has A fib, HTN, diastolic dysfunction. Has had intermittent palpitations which she feels are stable. Taking metoprolol and eliquis as rx'd. No bleeding tendencies.    She has chronic MDD for which she is currently on effexor. Taking as rx'd. Denies side effects. Moods stable although she has had increased anxiety related to COVID 19 pandemic.    She c/o vertigo which began 6 weeks ago, resolved and then recurred 2 week ago. Spinning sensation with change in position of head, rolling over in bed. Fell x 1, no LOC. Has slowly gotten better each day for past few days. Not dizzy today. Denies other focal neuro symptoms.    The following portions of the patient's history were reviewed and updated as appropriate: allergies, current medications, past family history, past medical history, past social history, past surgical history and problem list.    Review of Systems "   Constitutional: Positive for fatigue. Negative for appetite change, fever and unexpected weight change.   HENT: Positive for hearing loss and voice change. Negative for congestion, ear pain, nosebleeds, rhinorrhea, sneezing, sore throat, tinnitus and trouble swallowing.    Eyes: Positive for visual disturbance. Negative for pain, discharge and redness.   Respiratory: Negative for cough, chest tightness, shortness of breath and wheezing.    Cardiovascular: Positive for palpitations and leg swelling. Negative for chest pain.   Gastrointestinal: Negative for abdominal pain, blood in stool, constipation, diarrhea, nausea and vomiting.        Heartburn   Endocrine: Negative for cold intolerance, heat intolerance, polydipsia and polyuria.   Genitourinary: Negative for dysuria, flank pain, frequency, hematuria and urgency.   Musculoskeletal: Negative for arthralgias, back pain, joint swelling, myalgias and neck pain.   Skin: Negative for rash and wound.   Neurological: Negative for dizziness, tremors, seizures, syncope, speech difficulty, weakness, numbness and headaches.   Hematological: Negative for adenopathy. Does not bruise/bleed easily.   Psychiatric/Behavioral: Positive for dysphoric mood and sleep disturbance. Negative for confusion and suicidal ideas. The patient is nervous/anxious.    Pt's previous ROS reviewed and updated as indicated.       Objective   Physical Exam   Constitutional: She is oriented to person, place, and time. She is cooperative. No distress.   Neurological: She is alert and oriented to person, place, and time.   Psychiatric: Her speech is normal. Thought content normal. Her affect is labile (mildly). Cognition and memory are normal.     Labs 4/17/2020:  Free T4 = 0.7  TSH = 0.01    Assessment/Plan   Millie was seen today for atrial fibrillation.    Diagnoses and all orders for this visit:    Atrial fibrillation, persistent    Essential hypertension    Acquired hypothyroidism    Major  depression, recurrent, chronic (CMS/HCC)    Vertigo    Iatrogenic hyperthyroidism    Other orders  -     NP THYROID 15 MG tablet; Take 1 tablet by mouth Daily.  -     SYNTHROID 25 MCG tablet; Take 1 tablet by mouth Daily.      Baseline hypothyroidism but currently in hyperthyroid state due to excess replacement. She has inadequate T4, too much T3, TSH still quite suppressed. Advise decrease NP thyroid to dose as above, add low dose synthroid and recheck labs in 8 weeks.     a fib appearing stable. On rate controlling med. Vitals being monitored via home health. On appropriate anticoagulation with eliquis.    MDD stable, continue SNRI and elavil.    Positional vertigo improving. She is encouraged to report recurrence or new focal neuro symptoms.    Routine f/u in 2 months, sooner as needed.  Patient was encouraged to keep me informed of any acute changes, lack of improvement, or any new concerning symptoms.  Pt is aware of reasons to seek emergent care.  Patient voiced understanding of all instructions and denied further questions.        This visit has been rescheduled as a phone visit to comply with patient safety concerns in accordance with CDC recommendations. Total time of discussion was 27 minutes.

## 2020-04-20 NOTE — PROGRESS NOTES
You have chosen to receive care through a telephone visit. Do you consent to use a telephone visit for your medical care today?  Yes

## 2020-04-22 ENCOUNTER — TELEPHONE (OUTPATIENT)
Dept: FAMILY MEDICINE CLINIC | Facility: CLINIC | Age: 75
End: 2020-04-22

## 2020-04-22 NOTE — TELEPHONE ENCOUNTER
Yes, she will be on lowest dose of NP thyroid and brand name synthroid lowest dose 25 mcg. This is actually overall much lower amount of thyroid than she has been taking.

## 2020-04-22 NOTE — TELEPHONE ENCOUNTER
PT IS REQUESTING A CALL BACK ABOUT HER THYROID MEDICATION FOR CLARIFICATION.  THE PHARMACY IS CONCERNED THE PT WOULD BE TAKING 2 THYROID MEDICATIONS    HER ORGANIC AND SYNTHROID 25 MCG     PT CALL BACK [phone]     [facility]

## 2020-04-23 ENCOUNTER — PRIOR AUTHORIZATION (OUTPATIENT)
Dept: FAMILY MEDICINE CLINIC | Facility: CLINIC | Age: 75
End: 2020-04-23

## 2020-04-23 NOTE — TELEPHONE ENCOUNTER
A prior auth was started through cover my meds for the synthroid.     Patients medication has been approved through her insurance and the patients pharmacy has been notified.    Per Dr. Velásquez the patient is to take this medication with the NP Thyroid.    Pharmacy was notified of this as well.

## 2020-05-12 ENCOUNTER — TELEPHONE (OUTPATIENT)
Dept: FAMILY MEDICINE CLINIC | Facility: CLINIC | Age: 75
End: 2020-05-12

## 2020-05-12 ENCOUNTER — TELEPHONE (OUTPATIENT)
Dept: OBSTETRICS AND GYNECOLOGY | Facility: CLINIC | Age: 75
End: 2020-05-12

## 2020-05-12 DIAGNOSIS — N95.1 MENOPAUSAL SYMPTOMS: ICD-10-CM

## 2020-05-12 RX ORDER — METOPROLOL SUCCINATE 200 MG/1
200 TABLET, EXTENDED RELEASE ORAL DAILY
Qty: 90 TABLET | Refills: 1 | Status: SHIPPED | OUTPATIENT
Start: 2020-05-12 | End: 2020-11-02 | Stop reason: SDUPTHER

## 2020-05-12 RX ORDER — PANTOPRAZOLE SODIUM 40 MG/1
40 TABLET, DELAYED RELEASE ORAL DAILY
Qty: 30 TABLET | Refills: 5 | Status: SHIPPED | OUTPATIENT
Start: 2020-05-12 | End: 2020-05-12 | Stop reason: SDUPTHER

## 2020-05-12 RX ORDER — LISINOPRIL 40 MG/1
40 TABLET ORAL DAILY
Qty: 90 TABLET | Refills: 1 | Status: SHIPPED | OUTPATIENT
Start: 2020-05-12 | End: 2020-11-02 | Stop reason: SDUPTHER

## 2020-05-12 RX ORDER — LISINOPRIL 40 MG/1
40 TABLET ORAL DAILY
Qty: 30 TABLET | Refills: 5 | Status: SHIPPED | OUTPATIENT
Start: 2020-05-12 | End: 2020-05-12 | Stop reason: SDUPTHER

## 2020-05-12 RX ORDER — PANTOPRAZOLE SODIUM 40 MG/1
40 TABLET, DELAYED RELEASE ORAL DAILY
Qty: 90 TABLET | Refills: 1 | Status: SHIPPED | OUTPATIENT
Start: 2020-05-12 | End: 2020-10-23

## 2020-05-12 RX ORDER — METOPROLOL SUCCINATE 200 MG/1
200 TABLET, EXTENDED RELEASE ORAL DAILY
Qty: 30 TABLET | Refills: 5 | Status: SHIPPED | OUTPATIENT
Start: 2020-05-12 | End: 2020-05-12 | Stop reason: SDUPTHER

## 2020-05-12 NOTE — TELEPHONE ENCOUNTER
Marycarmen called back requesting medication refills for the patient.  Lisinopril, metoprolol, pantoprazole. All for 90 day supply.    Medications have been sent.

## 2020-05-12 NOTE — TELEPHONE ENCOUNTER
I do not see anything in her medical history regarding hallucinations related to psychiatric illness although she does have major depression.    Has she been sick, fever, UTI symptoms, etc? More depressed even if not suicidal?    Is she having command hallucinations (voices telling her to do things)?    Would she be willing to meet with a psychiatrist?

## 2020-05-12 NOTE — TELEPHONE ENCOUNTER
Shadi Arana with [facility] called regarding Ms. Bermudez. She states that the patient has been having hallucinations and that they have become scary.       Shadi states that she has working with the patient for a very long time and that this is her first time mentioning the hallucinations to her.  She did to a screening with the patient and states that she is not suicidal.     Shadi states that she just wanted to inform the DrSumit About this issue.    If you have any questions or concerns, shadi can be reached at [phone]

## 2020-05-12 NOTE — TELEPHONE ENCOUNTER
----- Message from Kristine Chin sent at 5/12/2020  9:12 AM EDT -----  Contact: HOME HEALTH NURSE  THIS IS DR CARCAMO'S PT.  SHE NEEDS A REFILL ON HER PREMPRO SENT TO [facility].  THANKS

## 2020-05-12 NOTE — TELEPHONE ENCOUNTER
Marycarmen called back regarding Ms. Richardson. She states that the patient did not report any issues with a UTI, but she states that the patient did have an infection in her mouth and was treated by a dentist with a month's worth of doxycycline.    Marycarmen states that all the patient would say about the voices is that they were scary, but she was not commanded to do anything.    Marycarmen also states that the patient has met with psychiatrist in the past  And has been treated with several different medications, but could never find one that she felt worked for her.

## 2020-05-12 NOTE — TELEPHONE ENCOUNTER
I attempted to contact the patient regarding a possible referral to psychiatry.    There was no answer, and I could not leave a message.

## 2020-06-03 ENCOUNTER — TELEPHONE (OUTPATIENT)
Dept: FAMILY MEDICINE CLINIC | Facility: CLINIC | Age: 75
End: 2020-06-03

## 2020-06-03 DIAGNOSIS — F41.9 ANXIETY DUE TO INVASIVE PROCEDURE: Primary | ICD-10-CM

## 2020-06-03 NOTE — TELEPHONE ENCOUNTER
MAYUR WITH [facility] STATED THE PATIENT IS SUPPOSE TO HAVE ORAL SURGERY AND SHE SAID THE DENTIST SAID HER TSH LEVELS HAD TO BE NORMAL BEFORE SURGERY      MAYUR CALL BACK [phone]

## 2020-06-03 NOTE — TELEPHONE ENCOUNTER
Marycarmen from [facility] is calling requesting a verbal order to see the patient 1x for 4 weeks to continue care. Please advise and call back    [phone]

## 2020-06-09 PROCEDURE — G0179 MD RECERTIFICATION HHA PT: HCPCS | Performed by: FAMILY MEDICINE

## 2020-06-10 ENCOUNTER — OUTSIDE FACILITY SERVICE (OUTPATIENT)
Dept: FAMILY MEDICINE CLINIC | Facility: CLINIC | Age: 75
End: 2020-06-10

## 2020-06-10 RX ORDER — DIAZEPAM 2 MG/1
TABLET ORAL
Qty: 4 TABLET | Refills: 0 | Status: SHIPPED | OUTPATIENT
Start: 2020-06-10 | End: 2020-07-10

## 2020-06-10 NOTE — TELEPHONE ENCOUNTER
Patient informed orders in chart. Patient also states dentist is going to give her small dose of Xanax prior to surgery and she does not do well with that medication, states she told them that and they just told her it would only be a small dose, but she still does not want to take it. Would like to know if you can send in a small dose of Valium for her.

## 2020-06-10 NOTE — TELEPHONE ENCOUNTER
I have sent in rx for valium 2 mg. She can start with one 1 hour to procedure. If minimal effect, she can take another 30 mins prior to procedure. This medication will make her sleepy and she should not drive until next day.

## 2020-06-16 ENCOUNTER — TELEPHONE (OUTPATIENT)
Dept: FAMILY MEDICINE CLINIC | Facility: CLINIC | Age: 75
End: 2020-06-16

## 2020-06-16 NOTE — TELEPHONE ENCOUNTER
PATIENT CALLED REQUESTING LAB RESULTS THAT WERE DONE ON Friday 06/12    PATIENT HAS DENTAL SURGERY PENDING ON  THE LAB RESULTS     PATIENT CALL BACK [phone]

## 2020-06-16 NOTE — TELEPHONE ENCOUNTER
Please see result note.    Please inform dentist Wm Estrada Lazcano DMD that she is cleared for dental surgery as TSH now above 6

## 2020-07-01 ENCOUNTER — TELEPHONE (OUTPATIENT)
Dept: FAMILY MEDICINE CLINIC | Facility: CLINIC | Age: 75
End: 2020-07-01

## 2020-07-01 RX ORDER — BUPROPION HYDROCHLORIDE 300 MG/1
300 TABLET ORAL DAILY
Qty: 30 TABLET | Refills: 5 | Status: SHIPPED | OUTPATIENT
Start: 2020-07-01 | End: 2021-01-04 | Stop reason: SDUPTHER

## 2020-07-01 NOTE — TELEPHONE ENCOUNTER
[facility] called to request a refill of     buPROPion XL (WELLBUTRIN XL) 300 MG 24 hr tablet    Pharmacy: [facility]  PH: [phone]  FAX: [phone]

## 2020-07-08 ENCOUNTER — TELEPHONE (OUTPATIENT)
Dept: FAMILY MEDICINE CLINIC | Facility: CLINIC | Age: 75
End: 2020-07-08

## 2020-07-08 DIAGNOSIS — N95.1 MENOPAUSAL SYMPTOMS: ICD-10-CM

## 2020-07-08 DIAGNOSIS — Z91.81 RISK FOR FALLS: ICD-10-CM

## 2020-07-08 DIAGNOSIS — R53.1 GENERAL WEAKNESS: Primary | ICD-10-CM

## 2020-07-08 NOTE — TELEPHONE ENCOUNTER
Spoke with  nurse, Marycarmen, states she manages all of her medications and she is not sure as to what medications she is talking about. As far as the labs we faxed those results to dentist office in June. Marycarmen is going to call Millie and work it out. Will let us know if there is anything else she needs from us.

## 2020-07-08 NOTE — TELEPHONE ENCOUNTER
MAYUR CAMPOS NURSE [place] [facility] [place]ING JUDITH BACK TO DISCUSS REFILLS     MAYUR: [phone]

## 2020-07-08 NOTE — TELEPHONE ENCOUNTER
[place] [facility] [place] AND ADVISED THAT PT IS REQUESTING A TRANSPORT CHAIR..ONE THAT IS EASIER TO GET IN AND OUT OF THE CAR; PLEASE ADVISE    MAYUR: [phone]

## 2020-07-08 NOTE — TELEPHONE ENCOUNTER
PATIENT IS CONFUSED AS TO WHY NONE OF THE PRESCRIPTIONS THAT HER NURSE MAYUR CALLED IN HAVE ANY REFILLS ON THEM. SHE FEELS LIKE SHE'S BEING BLACKMAILED TO COME INTO THE OFFICE    SHE ALSO IS WONDERING ABOUT BLOODWORK, BECAUSE THE DENTIST WON'T SEE HER UNTIL SHE HAS BLOODWORK DONE    PATIENT CALL BACK PH: [phone]

## 2020-07-08 NOTE — TELEPHONE ENCOUNTER
Spoke with Marycarmen, states patient just needs order for transport chair sent to local DME company, no preference. Advised will send order once Dr. Velásquez back in office, Marycarmen to advise patient.

## 2020-07-09 RX ORDER — VENLAFAXINE 100 MG/1
100 TABLET ORAL 2 TIMES DAILY
Qty: 180 TABLET | Refills: 1 | Status: SHIPPED | OUTPATIENT
Start: 2020-07-09 | End: 2020-11-02 | Stop reason: SDUPTHER

## 2020-07-09 RX ORDER — LEVOTHYROXINE, LIOTHYRONINE 9.5; 2.25 UG/1; UG/1
15 TABLET ORAL DAILY
Qty: 90 TABLET | Refills: 1 | Status: SHIPPED | OUTPATIENT
Start: 2020-07-09 | End: 2020-07-16 | Stop reason: DRUGHIGH

## 2020-07-09 RX ORDER — APIXABAN 5 MG/1
5 TABLET, FILM COATED ORAL EVERY 12 HOURS SCHEDULED
Qty: 180 TABLET | Refills: 1 | Status: SHIPPED | OUTPATIENT
Start: 2020-07-09 | End: 2020-11-02 | Stop reason: SDUPTHER

## 2020-07-09 RX ORDER — LEVOTHYROXINE SODIUM 25 MCG
25 TABLET ORAL DAILY
Qty: 90 TABLET | Refills: 1 | Status: SHIPPED | OUTPATIENT
Start: 2020-07-09 | End: 2020-07-20 | Stop reason: ALTCHOICE

## 2020-07-09 NOTE — TELEPHONE ENCOUNTER
Spoke with Marycarmen, patient needs refills on Venlafaxine, Eliquis, Synthroid and NP Thyroid. Refills sent to pharmacy.

## 2020-07-10 ENCOUNTER — OFFICE VISIT (OUTPATIENT)
Dept: FAMILY MEDICINE CLINIC | Facility: CLINIC | Age: 75
End: 2020-07-10

## 2020-07-10 VITALS
TEMPERATURE: 97.3 F | HEART RATE: 65 BPM | BODY MASS INDEX: 33.19 KG/M2 | DIASTOLIC BLOOD PRESSURE: 70 MMHG | WEIGHT: 219 LBS | SYSTOLIC BLOOD PRESSURE: 130 MMHG | OXYGEN SATURATION: 98 % | HEIGHT: 68 IN

## 2020-07-10 DIAGNOSIS — M25.561 ARTHRALGIA OF BOTH KNEES: Primary | ICD-10-CM

## 2020-07-10 DIAGNOSIS — M25.562 ARTHRALGIA OF BOTH KNEES: Primary | ICD-10-CM

## 2020-07-10 DIAGNOSIS — M54.2 CERVICALGIA: ICD-10-CM

## 2020-07-10 DIAGNOSIS — E03.9 ACQUIRED HYPOTHYROIDISM: ICD-10-CM

## 2020-07-10 DIAGNOSIS — M43.6 SPASTIC TORTICOLLIS: ICD-10-CM

## 2020-07-10 PROCEDURE — 99214 OFFICE O/P EST MOD 30 MIN: CPT | Performed by: FAMILY MEDICINE

## 2020-07-10 RX ORDER — DIAZEPAM 2 MG/1
2 TABLET ORAL EVERY 8 HOURS PRN
Qty: 15 TABLET | Refills: 1 | Status: SHIPPED | OUTPATIENT
Start: 2020-07-10 | End: 2020-07-20 | Stop reason: SDUPTHER

## 2020-07-10 NOTE — PROGRESS NOTES
"    Established Patient        Chief Complaint:   Chief Complaint   Patient presents with   • Neck Pain     radiating down RUE and Left knee at times        Millie Richardson is a 74 y.o. female    History of Present Illness:   Normally seen by her primary care provider Dr. Velásquez, who was requested to be evaluated today due to acute neck discomfort.  She denies any trauma.  No recent changes to her pillow or sleeping posture.  She denies any fever, chills or night sweats.  Patient describes the discomfort is worsened with certain positions and movement, particularly those requiring side bending and rotation to the affected side.  Is predominantly to the right side of the cervical spine.  She denies any aspiration or dysphagia.  Denies any loss of hearing, abnormal smells or tastes.  Denies any epistaxis or hemoptysis.  Patient states that the pain does radiate down her right upper extremity, involving the scapula and trapezius.  She denies any overlying skin changes, no rashes.    Patient also complains of chronic bilateral knee pain.  Having been told she has degenerative osteoarthritis in the past.  She has had particularly worsened pain to the left knee recently as well.  She denies any single episodes of trauma.  She does ambulate independently.    Subjective     The following portions of the patient's history were reviewed and updated as appropriate: allergies, current medications, past family history, past medical history, past social history, past surgical history and problem list.    Allergies   Allergen Reactions   • Penicillins Anaphylaxis   • Sulfa Antibiotics Other (See Comments)     Patient states \"it made me want to go to bed, I didn't have a fever but I felt like I did\"        Review of Systems  1. Constitutional: Negative for fever. Negative for chills, diaphoresis, fatigue and unexpected weight change.   2. HENT: No dysphagia; no changes to vision/hearing/smell/taste; no epistaxis.  Neck pain as per " "above.  3. Eyes: Negative for redness and visual disturbance.   4. Respiratory: negative for shortness of breath. Negative for chest pain . Negative for cough and chest tightness.   5. Cardiovascular: Negative for chest pain and palpitations.   6. Gastrointestinal: Negative for abdominal distention, abdominal pain and blood in stool.   7. Endocrine: Negative for cold intolerance and heat intolerance.   8. Genitourinary: Negative for difficulty urinating, dysuria and frequency.   9. Musculoskeletal: Chronic bilateral knee arthralgias, associated cervical back pain and myalgias as per above.   10. Skin: Negative for color change, rash and wound.   11. Neurological: Negative for syncope, weakness and headaches.   12. Hematological: Negative for adenopathy. Does not bruise/bleed easily.   13. Psychiatric/Behavioral: Negative for confusion. The patient is not nervous/anxious.    Objective     Physical Exam   Vital Signs: /70   Pulse 65   Temp 97.3 °F (36.3 °C)   Ht 172.7 cm (68\")   Wt 99.3 kg (219 lb)   LMP  (LMP Unknown)   SpO2 98%   BMI 33.30 kg/m²     General Appearance: alert, oriented x 3, no acute distress.  Skin: warm and dry.   HEENT: Atraumatic.  pupils round and reactive to light and accommodation, oral mucosa pink and moist.  Nares patent without epistaxis.  External auditory canals are patent tympanic membranes intact.  Neck: supple, no JVD, trachea midline.  No thyromegaly.  Spastic paravertebral musculature noted to the cervical spine, more problematic to the right side.  This does lead to impaired sidebending and rotation.  Flexion/extension normal.  Lungs: CTA, unlabored breathing effort.  Heart: RRR, normal S1 and S2, no S3, no rub.  Abdomen: soft, non-tender, no palpable bladder, present bowel sounds to auscultation ×4.  No guarding or rigidity.  Extremities: no clubbing, cyanosis.  Good range of motion actively and passively.  Symmetric muscle strength and development.  Chronic gravity " dependent edema noted to the lower extremities, does not extend above the medial tibias bilaterally.  Quadriceps mechanism intact, significant crepitance noted on A/P range of motion testing.  Medial/lateral joint line tenderness noted to bilateral knees, slightly more pronounced on the left.  Jessica/Estrada sign negative.  No leg length discrepancies.  Normal dorsiflexion/plantar flexion of bilateral feet.  Patient does have a slightly antalgic gait.  Neuro: normal speech and mental status.  Cranial nerves II through XII intact.  No anosmia. DTR 2+; proprioception intact.  No focal motor/sensory deficits.    Assessment and Plan      Assessment:   Millie was seen today for neck pain.    Diagnoses and all orders for this visit:    Arthralgia of both knees  -     XR knee 3 vw bilateral    Cervicalgia  -     XR Spine Cervical Complete With Obli Flex Ext    Spastic torticollis  -     diazePAM (VALIUM) 2 MG tablet; Take 1 tablet by mouth Every 8 (Eight) Hours As Needed for Muscle Spasms.    Acquired hypothyroidism  -     TSH  -     T4, Free        Plan:  I have recommended x-ray evaluation of bilateral knees, I do suspect degenerative osteoarthritis of significant nature.  I recommended ice compress therapy to bilateral knees, as well as continuation of quadricep strengthening exercise program.    I have also recommended x-ray evaluation of the cervical spine, although I do suspect this is muscular in nature, and I have offered low-dose diazepam as a means of treatment for this.  I recommended alternating usage of ice compress therapy and moist heat therapy.    Patient does request updated thyroid labs today.  I have placed orders for these, dosing adjustment can be made based on her results.    Prior to establishing with this practice, patient was seen at the [facility].  I will request date of most recent Medicare wellness visit, as well as last mammogram and colonoscopy.  Discussion Summary:    Discussed plan of  care in detail with pt today; pt verb understanding and agrees.    Follow up:  No follow-ups on file.     There are no Patient Instructions on file for this visit.    Rajan Luis DO  07/10/20  14:28          Please note that portions of this note may have been completed with a voice recognition program. Efforts were made to edit the dictations, but occasionally words are mistranscribed.

## 2020-07-13 NOTE — TELEPHONE ENCOUNTER
Not sure why patient needs a transport chair. She was ambulatory last time she was seen in office.

## 2020-07-14 NOTE — TELEPHONE ENCOUNTER
Last face to face visit on 07/10/2020 Dr. Luis, will send order along with notes once note signed.

## 2020-07-16 ENCOUNTER — TELEPHONE (OUTPATIENT)
Dept: FAMILY MEDICINE CLINIC | Facility: CLINIC | Age: 75
End: 2020-07-16

## 2020-07-16 ENCOUNTER — RESULTS ENCOUNTER (OUTPATIENT)
Dept: FAMILY MEDICINE CLINIC | Facility: CLINIC | Age: 75
End: 2020-07-16

## 2020-07-16 DIAGNOSIS — Z12.31 ENCOUNTER FOR SCREENING MAMMOGRAM FOR MALIGNANT NEOPLASM OF BREAST: ICD-10-CM

## 2020-07-16 DIAGNOSIS — M89.9 LESION OF FEMUR: Primary | ICD-10-CM

## 2020-07-16 DIAGNOSIS — Z12.11 SCREEN FOR COLON CANCER: ICD-10-CM

## 2020-07-16 DIAGNOSIS — Z12.39 BREAST CANCER SCREENING: ICD-10-CM

## 2020-07-16 DIAGNOSIS — M47.22 CERVICAL RADICULOPATHY DUE TO DEGENERATIVE JOINT DISEASE OF SPINE: ICD-10-CM

## 2020-07-16 DIAGNOSIS — M43.6 SPASTIC TORTICOLLIS: ICD-10-CM

## 2020-07-16 DIAGNOSIS — R93.6 ABNORMAL X-RAY OF FEMUR: ICD-10-CM

## 2020-07-16 RX ORDER — LEVOTHYROXINE, LIOTHYRONINE 9.5; 2.25 UG/1; UG/1
15 TABLET ORAL DAILY
Qty: 90 TABLET | Refills: 1 | Status: CANCELLED | OUTPATIENT
Start: 2020-07-16

## 2020-07-16 RX ORDER — LEVOTHYROXINE, LIOTHYRONINE 19; 4.5 UG/1; UG/1
30 TABLET ORAL DAILY
Qty: 90 TABLET | Refills: 1 | Status: SHIPPED | OUTPATIENT
Start: 2020-07-16 | End: 2020-07-20 | Stop reason: SDUPTHER

## 2020-07-16 NOTE — TELEPHONE ENCOUNTER
Caller: Rita     Relationship:     Best call back number: n/a    What medications are you currently taking:   Current Outpatient Medications on File Prior to Visit   Medication Sig Dispense Refill   • amitriptyline (ELAVIL) 25 MG tablet Take 25 mg by mouth Every Night.     • amLODIPine (NORVASC) 5 MG tablet Take 1 tablet by mouth Daily. 90 tablet 3   • buPROPion XL (Wellbutrin XL) 300 MG 24 hr tablet Take 1 tablet by mouth Daily. 30 tablet 5   • diazePAM (VALIUM) 2 MG tablet Take 1 tablet by mouth Every 8 (Eight) Hours As Needed for Muscle Spasms. 15 tablet 1   • ELIQUIS 5 MG tablet tablet Take 1 tablet by mouth Every 12 (Twelve) Hours. 180 tablet 1   • estrogen, conjugated,-medroxyprogesterone (Prempro) 0.3-1.5 MG per tablet Take 1 tablet by mouth Daily. 84 tablet 0   • lisinopril (PRINIVIL,ZESTRIL) 40 MG tablet Take 1 tablet by mouth Daily. 90 tablet 1   • metoprolol succinate XL (TOPROL-XL) 200 MG 24 hr tablet Take 1 tablet by mouth Daily. 90 tablet 1   • NP THYROID 15 MG tablet Take 1 tablet by mouth Daily. 90 tablet 1   • pantoprazole (PROTONIX) 40 MG EC tablet Take 1 tablet by mouth Daily. 90 tablet 1   • potassium chloride (K-DUR,KLOR-CON) 20 MEQ CR tablet Take 1 tablet by mouth Daily. 30 tablet 5   • SYNTHROID 25 MCG tablet Take 1 tablet by mouth Daily. 90 tablet 1   • venlafaxine (EFFEXOR) 100 MG tablet Take 1 tablet by mouth 2 (Two) Times a Day. 180 tablet 1     No current facility-administered medications on file prior to visit.         When did you start taking these medications: n/a    Which medication are you concerned about: NP Thyroid    Who prescribed you this medication: [facility]     What are your concerns: Patient stated that her dosage was changed to 2 pills a day and she only has 5 pills left. Patient needs an updated prescription sent to [facility].  How long have you been taking these medications: n/a    How long have you had these concerns: n/a

## 2020-07-20 ENCOUNTER — OFFICE VISIT (OUTPATIENT)
Dept: FAMILY MEDICINE CLINIC | Facility: CLINIC | Age: 75
End: 2020-07-20

## 2020-07-20 VITALS
HEART RATE: 85 BPM | DIASTOLIC BLOOD PRESSURE: 90 MMHG | WEIGHT: 219 LBS | HEIGHT: 68 IN | BODY MASS INDEX: 33.19 KG/M2 | SYSTOLIC BLOOD PRESSURE: 140 MMHG | OXYGEN SATURATION: 96 % | TEMPERATURE: 97.2 F

## 2020-07-20 DIAGNOSIS — R93.6 ABNORMAL X-RAY OF FEMUR: ICD-10-CM

## 2020-07-20 DIAGNOSIS — M43.6 SPASTIC TORTICOLLIS: ICD-10-CM

## 2020-07-20 DIAGNOSIS — E03.9 ACQUIRED HYPOTHYROIDISM: ICD-10-CM

## 2020-07-20 DIAGNOSIS — I10 ESSENTIAL HYPERTENSION: ICD-10-CM

## 2020-07-20 DIAGNOSIS — I48.19 ATRIAL FIBRILLATION, PERSISTENT (HCC): ICD-10-CM

## 2020-07-20 DIAGNOSIS — Z79.01 CHRONIC ANTICOAGULATION: ICD-10-CM

## 2020-07-20 DIAGNOSIS — M47.22 CERVICAL RADICULOPATHY DUE TO DEGENERATIVE JOINT DISEASE OF SPINE: Primary | ICD-10-CM

## 2020-07-20 DIAGNOSIS — M17.0 TRICOMPARTMENT OSTEOARTHRITIS OF BOTH KNEES: ICD-10-CM

## 2020-07-20 PROBLEM — K21.9 GASTROESOPHAGEAL REFLUX DISEASE WITHOUT ESOPHAGITIS: Chronic | Status: ACTIVE | Noted: 2020-01-17

## 2020-07-20 PROCEDURE — 99214 OFFICE O/P EST MOD 30 MIN: CPT | Performed by: FAMILY MEDICINE

## 2020-07-20 RX ORDER — LEVOTHYROXINE, LIOTHYRONINE 19; 4.5 UG/1; UG/1
45 TABLET ORAL DAILY
Qty: 135 TABLET | Refills: 1 | Status: SHIPPED | OUTPATIENT
Start: 2020-07-20 | End: 2020-11-02 | Stop reason: SDUPTHER

## 2020-07-20 RX ORDER — DIAZEPAM 2 MG/1
2 TABLET ORAL EVERY 8 HOURS PRN
Qty: 30 TABLET | Refills: 1 | Status: SHIPPED | OUTPATIENT
Start: 2020-07-20 | End: 2020-08-10

## 2020-07-20 NOTE — PROGRESS NOTES
"    Established Patient        Chief Complaint:   Chief Complaint   Patient presents with   • Neck Pain             Millie Richardson is a 74 y.o. female    History of Present Illness:   Here in follow-up of her previously diagnosed spastic torticollis of the right neck, as well as bilateral knee pain.  Patient has a chronic medical history additionally of essential hypertension, atrial fibrillation and hypothyroidism.  She has been intolerant to levothyroxine in the past, most recent laboratory evaluation demonstrated findings consistent with continued clinical hypothyroidism.  She continues to utilize NP thyroid additionally.  She denies any problems with compliance with her supplementation.    Patient also reports significant improvement to the spasticity noted to the right neck, she underwent x-ray examination of cervical spine as well as bilateral knees.  There was a distal left femoral sclerotic lesion with findings of tricompartmental osteoarthritis of bilateral knees.  She denies any new areas of trauma.  Continues to be bothered with nonspecific bilateral knee pain, worsened with certain activities and weightbearing status.  Denies any fever, chills or night sweats.  She denies any palpitations, vertigo or syncope.  No complaints of any bright red blood or black or tarry stools, without hematuria, epistaxis or hemoptysis.  She describes pain of radicular nature that originates from her right side of her neck.  She has responded well to low-dose diazepam as a means of muscle relaxant treatment.    Subjective     The following portions of the patient's history were reviewed and updated as appropriate: allergies, current medications, past family history, past medical history, past social history, past surgical history and problem list.    Allergies   Allergen Reactions   • Penicillins Anaphylaxis   • Sulfa Antibiotics Other (See Comments)     Patient states \"it made me want to go to bed, I didn't have a fever but I " "felt like I did\"        Review of Systems  1. Constitutional: Negative for fever. Negative for chills, diaphoresis, fatigue and unexpected weight change.   2. HENT: No dysphagia; no changes to vision/hearing/smell/taste; no epistaxis.  Neck pain as per above.  3. Eyes: Negative for redness and visual disturbance.   4. Respiratory: negative for shortness of breath. Negative for chest pain . Negative for cough and chest tightness.   5. Cardiovascular: Negative for chest pain and palpitations.   6. Gastrointestinal: Negative for abdominal distention, abdominal pain and blood in stool.   7. Endocrine: Negative for cold intolerance and heat intolerance.   8. Genitourinary: Negative for difficulty urinating, dysuria and frequency.   9. Musculoskeletal: Chronic bilateral knee arthralgias, associated cervical back pain and myalgias as per above.   10. Skin: Negative for color change, rash and wound.   11. Neurological: Negative for syncope, weakness and headaches.   12. Hematological: Negative for adenopathy. Does not bruise/bleed easily.   13. Psychiatric/Behavioral: Negative for confusion. The patient is not nervous/anxious.    Advance Care Planning   ACP discussion was held with the patient during this visit. Patient does not have an advance directive, information provided.    Objective     Physical Exam   Vital Signs: /90   Pulse 85   Temp 97.2 °F (36.2 °C)   Ht 172.7 cm (68\")   Wt 99.3 kg (219 lb)   LMP  (LMP Unknown)   SpO2 96%   BMI 33.30 kg/m²     General Appearance: alert, oriented x 3, no acute distress.  Skin: warm and dry.   HEENT: Atraumatic.  pupils round and reactive to light and accommodation, oral mucosa pink and moist.  Nares patent without epistaxis.  External auditory canals are patent tympanic membranes intact.  Neck: supple, no JVD, trachea midline.  No thyromegaly.  Spastic paravertebral musculature noted to the cervical spine, more problematic to the right side.  This does lead to " impaired sidebending and rotation.  Flexion/extension normal.  Lungs: CTA, unlabored breathing effort.  Heart: RRR, normal S1 and S2, no S3, no rub.  Abdomen: soft, non-tender, no palpable bladder, present bowel sounds to auscultation ×4.  No guarding or rigidity.  Extremities: no clubbing, cyanosis.  Good range of motion actively and passively.  Symmetric muscle strength and development.  Chronic gravity dependent edema noted to the lower extremities, does not extend above the medial tibias bilaterally.  Quadriceps mechanism intact, significant crepitance noted on A/P range of motion testing.  Medial/lateral joint line tenderness noted to bilateral knees, slightly more pronounced on the left.  Jessica/Estrada sign negative.  No leg length discrepancies.  Normal dorsiflexion/plantar flexion of bilateral feet.  Patient does have a slightly antalgic gait.  Neuro: normal speech and mental status.  Cranial nerves II through XII intact.  No anosmia. DTR 2+; proprioception intact.  No focal motor/sensory deficits.    Assessment and Plan      Assessment:   Millie was seen today for follow-up.    Diagnoses and all orders for this visit:    Cervical radiculopathy due to degenerative joint disease of spine    Essential hypertension    Chronic anticoagulation    Atrial fibrillation, persistent    Abnormal x-ray of femur    Tricompartment osteoarthritis of both knees    Spastic torticollis  -     diazePAM (VALIUM) 2 MG tablet; Take 1 tablet by mouth Every 8 (Eight) Hours As Needed for Muscle Spasms.    Acquired hypothyroidism  -     NP THYROID 30 MG tablet; Take 1.5 tablets by mouth Daily.        Plan:  Continue prn use of diazepam.    Has appt to see orthopedic surgery on 7/27/20; will need repeat xrays, and if sclerotic area still present, may benefit from MRI; a good candidate for viscous supplementation.    MRI of cervical spine has been ordered; pt will try MRI [facility], and if unable to tolerate d/t claustrophobia, will change to  open MRI in [place] office.    Reviewed thyroid labs with pt today; still in hypothyroid state; will plan to stop synthroid; inc dose of NP thyroid to 45 mg daily; will repeat thyroid labs in 3-4 weeks, as further dose adjustment will likely be needed at that time.  Patient reports intolerance to levothyroxine in the past.    Blood pressure is not at goal, however patient has not yet taken her antihypertensive medication today.  I have asked that she continue with home/ambulatory blood pressure monitoring.    Discussion Summary:    Discussed plan of care in detail with pt today; pt verb understanding and agrees.    I have reviewed and updated all copied forward information, as appropriate.  I attest to the accuracy and relevance of any unchanged information.    Follow up:  No follow-ups on file.     There are no Patient Instructions on file for this visit.    Rajan Luis, DO  07/20/20  12:37          Please note that portions of this note may have been completed with a voice recognition program. Efforts were made to edit the dictations, but occasionally words are mistranscribed.

## 2020-07-21 ENCOUNTER — TELEPHONE (OUTPATIENT)
Dept: FAMILY MEDICINE CLINIC | Facility: CLINIC | Age: 75
End: 2020-07-21

## 2020-07-21 NOTE — TELEPHONE ENCOUNTER
OKSANA WITH [facility] STATES THAT THEY MAINTAIN THE PATIENT'S MEDICATION BOX AND THEY CHECK IN WITH HER ONCE MONTHLY.  OKSANA REPORTS THAT PATIENT CALLED HER AND STATED THAT HER MEDICATION WAS CHANGED DURING HER APPOINTMENT WITH DR COLLIER YESTERDAY AND WAS CONFUSED ABOUT WHAT MEDICATIONS TO TAKE. OKSANA IS REQUESTING A CALL BACK TO DISCUSS ANY CHANGES SO SHE CAN MAINTAIN ANY CHANGES TO THE MEDICATION BOX AND ADVISE PATIENT.     PLEASE CALL AND ADVISE [phone]

## 2020-07-24 ENCOUNTER — OFFICE VISIT (OUTPATIENT)
Dept: FAMILY MEDICINE CLINIC | Facility: CLINIC | Age: 75
End: 2020-07-24

## 2020-07-24 VITALS
WEIGHT: 219 LBS | BODY MASS INDEX: 33.19 KG/M2 | HEART RATE: 78 BPM | SYSTOLIC BLOOD PRESSURE: 122 MMHG | DIASTOLIC BLOOD PRESSURE: 80 MMHG | TEMPERATURE: 97.3 F | OXYGEN SATURATION: 94 % | HEIGHT: 68 IN

## 2020-07-24 DIAGNOSIS — Z79.01 CHRONIC ANTICOAGULATION: ICD-10-CM

## 2020-07-24 DIAGNOSIS — I48.19 ATRIAL FIBRILLATION, PERSISTENT (HCC): ICD-10-CM

## 2020-07-24 DIAGNOSIS — E03.9 ACQUIRED HYPOTHYROIDISM: Primary | ICD-10-CM

## 2020-07-24 DIAGNOSIS — R42 VERTIGO: ICD-10-CM

## 2020-07-24 DIAGNOSIS — I10 ESSENTIAL HYPERTENSION: ICD-10-CM

## 2020-07-24 PROCEDURE — 99214 OFFICE O/P EST MOD 30 MIN: CPT | Performed by: FAMILY MEDICINE

## 2020-07-29 ENCOUNTER — TELEPHONE (OUTPATIENT)
Dept: FAMILY MEDICINE CLINIC | Facility: CLINIC | Age: 75
End: 2020-07-29

## 2020-07-29 NOTE — TELEPHONE ENCOUNTER
PATIENT IS CONFUSED ABOUT APPT TIMES AND TESTS YOU WANT HER TO GET DONE, SHE THOUGHT YOU WANTED STUFF DONE BEFORE APPT Friday BUT SHE DOESN'T KNOW HOW OR WHERE TO GO    PH: [phone]

## 2020-07-31 ENCOUNTER — TELEPHONE (OUTPATIENT)
Dept: FAMILY MEDICINE CLINIC | Facility: CLINIC | Age: 75
End: 2020-07-31

## 2020-07-31 ENCOUNTER — HOSPITAL ENCOUNTER (OUTPATIENT)
Dept: MRI IMAGING | Facility: HOSPITAL | Age: 75
Discharge: HOME | End: 2020-07-31
Admitting: FAMILY MEDICINE

## 2020-07-31 DIAGNOSIS — M47.22 CERVICAL RADICULOPATHY DUE TO DEGENERATIVE JOINT DISEASE OF SPINE: ICD-10-CM

## 2020-07-31 DIAGNOSIS — M43.6 SPASTIC TORTICOLLIS: ICD-10-CM

## 2020-07-31 PROCEDURE — 72141 MRI NECK SPINE W/O DYE: CPT

## 2020-07-31 NOTE — TELEPHONE ENCOUNTER
JOSE FROM HOME HEALTH CALLED STATED THAT THEY SHE WILL BE DOING RECERTIFICATION FOR VISIT WITH PT Monday FOR [facility] AND WOULD LIKE  TO RECEIVE A CALL BACK FOR VERBAL ORDER.    PLEASE ADVISE.  CALL BACK:[phone]

## 2020-08-04 ENCOUNTER — TELEPHONE (OUTPATIENT)
Dept: FAMILY MEDICINE CLINIC | Facility: CLINIC | Age: 75
End: 2020-08-04

## 2020-08-04 NOTE — TELEPHONE ENCOUNTER
KACIE WITH [facility] IS REQUESTING THE PATIENT'S LAST VISIT NOTES TO BE FAXED OVER.  THE HOME HEALTH NURSES STATE THAT PATIENT HAD A COUPLE RECENT FALLS AND AN MRI AND KACIE WANTED TO MAKE SURE THERE IS NOTHING ELSE THEY NEED TO ADD TO THEIR NOTES.    PLEASE FAX NUMBER [phone]    ANY QUESTIONS, PLEASE CALL KACIE [phone]

## 2020-08-08 DIAGNOSIS — M43.6 SPASTIC TORTICOLLIS: ICD-10-CM

## 2020-08-10 ENCOUNTER — OFFICE VISIT (OUTPATIENT)
Dept: ORTHOPEDIC SURGERY | Facility: CLINIC | Age: 75
End: 2020-08-10

## 2020-08-10 VITALS — HEIGHT: 68 IN | WEIGHT: 219 LBS | RESPIRATION RATE: 18 BRPM | BODY MASS INDEX: 33.19 KG/M2

## 2020-08-10 DIAGNOSIS — M25.562 CHRONIC PAIN OF LEFT KNEE: ICD-10-CM

## 2020-08-10 DIAGNOSIS — G89.29 CHRONIC PAIN OF LEFT KNEE: ICD-10-CM

## 2020-08-10 DIAGNOSIS — M17.0 PRIMARY OSTEOARTHRITIS OF BOTH KNEES: ICD-10-CM

## 2020-08-10 DIAGNOSIS — M25.561 ARTHRALGIA OF RIGHT KNEE: Primary | ICD-10-CM

## 2020-08-10 PROCEDURE — 99203 OFFICE O/P NEW LOW 30 MIN: CPT | Performed by: PHYSICIAN ASSISTANT

## 2020-08-10 RX ORDER — DIAZEPAM 2 MG/1
TABLET ORAL
Qty: 30 TABLET | Refills: 1 | Status: SHIPPED | OUTPATIENT
Start: 2020-08-10 | End: 2020-09-17

## 2020-08-10 NOTE — PROGRESS NOTES
Subjective   Patient ID: Millie Richardson is a 74 y.o. right hand dominant female  Pain of the Right Knee (Ongoing for years, no injury,no treatment) and Pain of the Left Knee         History of Present Illness    Patient presents with complaints of intermittent bilateral knee pain left greater than right.  There is been no injury or trauma.  She states currently she is having no pain.  She denies knee instability or mechanical locking.  Her PCP did order x-ray of the left and right knee.  Patient uses a Rollator walker for chronic low back pain.    Pain Score: 4  Pain Location: Knee  Pain Orientation: Right, Left        Pain Frequency: Intermittent  Pain Onset: Ongoing     Clinical Progression: Gradually worsening  Aggravating Factors: Standing, Walking, Stairs        Pain Intervention(s): Medication (See MAR), Rest, Cold pack  Result of Injury: No       Past Medical History:   Diagnosis Date   • Anemia    • Anxiety    • Arthritis    • Atrial fibrillation (CMS/HCC)    • Blepharospasm    • Depression    • Gall stones    • GERD (gastroesophageal reflux disease)    • Heart attack (CMS/HCC)    • Heart murmur    • History of blood transfusion    • History of myocardial infarction 1999   • History of pneumonia    • History of rheumatic fever    • Hypertension    • Hypothyroidism     recent TSH abnormal hyperthyroid (patient noncompliant with reduced dose of [place] Thyroid).   • Irritable bowel syndrome (IBS)    • Migraines    • Myocardial infarction (CMS/HCC)    • Osteoporosis    • Ulcer         Past Surgical History:   Procedure Laterality Date   • CHOLECYSTECTOMY  2001       Family History   Problem Relation Age of Onset   • Colon cancer Father    • Hypertension Father    • Arthritis Father    • Heart attack Father    • Migraines Father    • Thyroid disease Mother    • Hypertension Mother    • Mental illness Mother    • Obesity Mother    • Cancer Brother    • Mental illness Brother        Social History     Socioeconomic  "History   • Marital status:      Spouse name: Not on file   • Number of children: Not on file   • Years of education: Not on file   • Highest education level: Not on file   Tobacco Use   • Smoking status: Never Smoker   • Smokeless tobacco: Never Used   Substance and Sexual Activity   • Alcohol use: No   • Drug use: No   • Sexual activity: Defer         Current Outpatient Medications:   •  amitriptyline (ELAVIL) 25 MG tablet, Take 25 mg by mouth Every Night., Disp: , Rfl:   •  amLODIPine (NORVASC) 5 MG tablet, Take 1 tablet by mouth Daily., Disp: 90 tablet, Rfl: 3  •  buPROPion XL (Wellbutrin XL) 300 MG 24 hr tablet, Take 1 tablet by mouth Daily., Disp: 30 tablet, Rfl: 5  •  diazePAM (VALIUM) 2 MG tablet, Take 1 tablet by mouth Every 8 (Eight) Hours As Needed for Muscle Spasms., Disp: 30 tablet, Rfl: 1  •  ELIQUIS 5 MG tablet tablet, Take 1 tablet by mouth Every 12 (Twelve) Hours., Disp: 180 tablet, Rfl: 1  •  estrogen, conjugated,-medroxyprogesterone (Prempro) 0.3-1.5 MG per tablet, Take 1 tablet by mouth Daily., Disp: 84 tablet, Rfl: 0  •  lisinopril (PRINIVIL,ZESTRIL) 40 MG tablet, Take 1 tablet by mouth Daily., Disp: 90 tablet, Rfl: 1  •  metoprolol succinate XL (TOPROL-XL) 200 MG 24 hr tablet, Take 1 tablet by mouth Daily., Disp: 90 tablet, Rfl: 1  •  NP THYROID 30 MG tablet, Take 1.5 tablets by mouth Daily., Disp: 135 tablet, Rfl: 1  •  pantoprazole (PROTONIX) 40 MG EC tablet, Take 1 tablet by mouth Daily., Disp: 90 tablet, Rfl: 1  •  potassium chloride (K-DUR,KLOR-CON) 20 MEQ CR tablet, Take 1 tablet by mouth Daily., Disp: 30 tablet, Rfl: 5  •  venlafaxine (EFFEXOR) 100 MG tablet, Take 1 tablet by mouth 2 (Two) Times a Day., Disp: 180 tablet, Rfl: 1    Allergies   Allergen Reactions   • Penicillins Anaphylaxis   • Sulfa Antibiotics Other (See Comments)     Patient states \"it made me want to go to bed, I didn't have a fever but I felt like I did\"        Review of Systems   Constitutional: Negative for " "diaphoresis, fever and unexpected weight change.   HENT: Negative for dental problem and sore throat.    Eyes: Negative for visual disturbance.   Respiratory: Negative for shortness of breath.    Cardiovascular: Negative for chest pain.   Gastrointestinal: Negative for abdominal pain, constipation, diarrhea, nausea and vomiting.   Genitourinary: Negative for difficulty urinating and frequency.   Musculoskeletal: Positive for arthralgias (bilateral knee), gait problem and joint swelling.   Neurological: Negative for headaches.   Hematological: Does not bruise/bleed easily.       I have reviewed the medical and surgical history, family history, social history, medications, and/or allergies, and the review of systems of this report.    Objective   Resp 18   Ht 172.7 cm (68\")   Wt 99.3 kg (219 lb)   LMP  (LMP Unknown)   BMI 33.30 kg/m²    Physical Exam   Constitutional: She is oriented to person, place, and time. She appears well-developed and well-nourished.   Pulmonary/Chest: Effort normal.   Musculoskeletal:        Right knee: She exhibits no ecchymosis and no erythema. Tenderness found. Medial joint line tenderness noted.        Left knee: She exhibits no ecchymosis and no erythema. Tenderness found. Medial joint line and lateral joint line tenderness noted.   Neurological: She is alert and oriented to person, place, and time.   Psychiatric: She has a normal mood and affect. Her behavior is normal.   Nursing note and vitals reviewed.    Right Knee Exam     Range of Motion   Extension: 5   Flexion: 120     Other   Erythema: absent      Left Knee Exam     Range of Motion   Extension: 5   Flexion: 110     Other   Erythema: absent           Extremity DVT signs are negative on physical exam with negative Jessica sign, no calf pain, no palpable cords and no skin tone change   Neurologic Exam     Mental Status   Oriented to person, place, and time.              Assessment/Plan   Independent Review of Radiographic " Studies:    I did review x-ray imaging from [facility] of the right knee which revealed a an endosteal sclerosis in the medial femoral shaft.  We will repeat the x-ray in 4 months.  Consider MRI with and without contrast    X-ray of the left knee reveals degenerative changes without acute fracture    Today in the office, bilateral knee weightbearing 2 views performed for the eval pain.  There does appear to be left knee medial severe joint space narrowing and moderate to severe right joint medial space narrowing      Procedures       Millie was seen today for pain and pain.    Diagnoses and all orders for this visit:    Arthralgia of right knee    Chronic pain of left knee    Primary osteoarthritis of both knees       Orthopedic activities reviewed and patient expressed appreciation  Discussion of orthopedic goals  Risk, benefits, and merits of treatment alternatives reviewed with the patient and questions answered    Recommendations/Plan:  Exercise, medications, injections, other patient advice, and return appointment as noted.  Patient is encouraged to call or return for any issues or concerns.      Patient politely declined a cortisone injection today.  She states she is not having enough pain to warrant an injection  Patient agreeable to call or return sooner for any concerns.           EMR Dragon-transcription disclaimer:  This encounter note is an electronic transcription of spoken language to printed text.  Electronic transcription of spoken language may permit erroneous or at times nonsensical words or phrases to be inadvertently transcribed.  Although I have reviewed the note for such errors, some may still exist

## 2020-08-13 ENCOUNTER — TELEPHONE (OUTPATIENT)
Dept: OBSTETRICS AND GYNECOLOGY | Facility: CLINIC | Age: 75
End: 2020-08-13

## 2020-08-13 DIAGNOSIS — N95.1 MENOPAUSAL SYMPTOMS: ICD-10-CM

## 2020-08-13 NOTE — TELEPHONE ENCOUNTER
----- Message from Kristine Chin sent at 8/13/2020 10:28 AM EDT -----  Contact: PT  THIS IS DR CARCAMO'S PT.  WE RESCHEDULED HER ANNUAL FOR October.  SHE NEEDS REFILLS ON PREMPRO SENT TO [facility].  THANKS

## 2020-08-21 ENCOUNTER — APPOINTMENT (OUTPATIENT)
Dept: MAMMOGRAPHY | Facility: HOSPITAL | Age: 75
End: 2020-08-21

## 2020-08-21 DIAGNOSIS — M43.6 SPASTIC TORTICOLLIS: ICD-10-CM

## 2020-08-21 RX ORDER — DIAZEPAM 2 MG/1
2 TABLET ORAL EVERY 8 HOURS PRN
Qty: 30 TABLET | Refills: 1 | Status: CANCELLED | OUTPATIENT
Start: 2020-08-21

## 2020-08-21 NOTE — TELEPHONE ENCOUNTER
Pt is requesting a refill of her diazepam.  Pt said it is working very well and doesn't hurt to get out of bed in morning. Pt is requesting it be sent to [facility].

## 2020-08-25 ENCOUNTER — TELEPHONE (OUTPATIENT)
Dept: FAMILY MEDICINE CLINIC | Facility: CLINIC | Age: 75
End: 2020-08-25

## 2020-08-25 NOTE — TELEPHONE ENCOUNTER
CALLING WITH A CONCERN FOR THE PATIENT.  SHE WENT TO THE ER Sunday NIGHT BECAUSE SHE FELL AND HIT HER HEAD. SHE GETS DIZZY AND FELL AGAIN AT HOME IN FRONT OF HER.  THE PATIENT WOULD LIKE US TO CALL HER DIRECTLY TO SCHEDULE THE ER FOLLOW UP SO SHE CAN COORDINATE HER TRANSPORTATION.    PLEASE CALL SHERI AS WELL, AND LET HER KNOW THE PATIENT HAS BEEN SCHEDULED, SHE STATED THE PATIENT IS UNRELIABLE AND MAY NOT SCHEDULE THE APPOINMENT [phone]

## 2020-08-27 NOTE — TELEPHONE ENCOUNTER
Called pt and was unable to lvm called the nurse and she did not answer. Was getting her in sooner.

## 2020-08-27 NOTE — TELEPHONE ENCOUNTER
PATIENT STATES SHE NEEDS AN APPOINTMENT SOONER THAN NEXT Wednesday 9-2-20     SHE HAS FALLEN SEVERAL TIMES AND SHE STATES HER NURSE IS CONCERNED     PATIENT CALL BACK [phone]

## 2020-08-28 ENCOUNTER — OFFICE VISIT (OUTPATIENT)
Dept: FAMILY MEDICINE CLINIC | Facility: CLINIC | Age: 75
End: 2020-08-28

## 2020-08-28 VITALS
SYSTOLIC BLOOD PRESSURE: 120 MMHG | OXYGEN SATURATION: 99 % | HEART RATE: 80 BPM | TEMPERATURE: 96.9 F | DIASTOLIC BLOOD PRESSURE: 80 MMHG | HEIGHT: 68 IN | BODY MASS INDEX: 33.04 KG/M2 | WEIGHT: 218 LBS

## 2020-08-28 DIAGNOSIS — M17.0 TRICOMPARTMENT OSTEOARTHRITIS OF BOTH KNEES: ICD-10-CM

## 2020-08-28 DIAGNOSIS — D75.89 MACROCYTOSIS: ICD-10-CM

## 2020-08-28 DIAGNOSIS — I10 ESSENTIAL HYPERTENSION: Primary | ICD-10-CM

## 2020-08-28 DIAGNOSIS — Z79.01 CHRONIC ANTICOAGULATION: ICD-10-CM

## 2020-08-28 DIAGNOSIS — E03.9 ACQUIRED HYPOTHYROIDISM: ICD-10-CM

## 2020-08-28 DIAGNOSIS — K21.9 GASTROESOPHAGEAL REFLUX DISEASE WITHOUT ESOPHAGITIS: Chronic | ICD-10-CM

## 2020-08-28 DIAGNOSIS — R53.83 MALAISE AND FATIGUE: ICD-10-CM

## 2020-08-28 DIAGNOSIS — I48.19 ATRIAL FIBRILLATION, PERSISTENT (HCC): ICD-10-CM

## 2020-08-28 DIAGNOSIS — E53.8 VITAMIN B12 DEFICIENCY: ICD-10-CM

## 2020-08-28 DIAGNOSIS — R53.81 MALAISE AND FATIGUE: ICD-10-CM

## 2020-08-28 PROCEDURE — 99214 OFFICE O/P EST MOD 30 MIN: CPT | Performed by: FAMILY MEDICINE

## 2020-08-28 NOTE — PROGRESS NOTES
"    Established Patient        Chief Complaint:   Chief Complaint   Patient presents with   • Headache/HTN/Hypothyroidism/GERD/OA             Millie Richardson is a 74 y.o. female    History of Present Illness:   Here today for follow-up of her essential hypertension, atrial fibrillation, GERD and osteoarthritis.  Patient recently suffered a fall with resultant cervical musculature spasticity and worsened pain.  He does radiate up into the base of the skull, as well as to the paravertebral musculature of the cervical spine.  It is responded well to moist heat therapy.  She does have chronic instability with ambulation, requiring Rollator for assistance with ambulation.  She denies any fever, chills or night sweats.  Denies any chest pain or any dyspnea on exertion.  Denies any ongoing palpitations.  Tolerating p.o. intake, although she does describe persistence of malaise/fatigue.    Patient is yet to undergo surveillance labs that were ordered at previous visit.  She would like to have these done at [facility] in [place].  Subjective     The following portions of the patient's history were reviewed and updated as appropriate: allergies, current medications, past family history, past medical history, past social history, past surgical history and problem list.    Allergies   Allergen Reactions   • Penicillins Anaphylaxis   • Sulfa Antibiotics Other (See Comments)     Patient states \"it made me want to go to bed, I didn't have a fever but I felt like I did\"        Review of Systems  1. Constitutional: Negative for fever. Negative for chills, diaphoresis.  Chronic malaise/fatigue and without unexpected weight change.   2. HENT: No dysphagia; no changes to vision/hearing/smell/taste; no epistaxis.  Neck pain as per above.  3. Eyes: Negative for redness and visual disturbance.   4. Respiratory: negative for shortness of breath. Negative for chest pain . Negative for cough and chest tightness.   5. Cardiovascular: " "Negative for chest pain and palpitations.   6. Gastrointestinal: Negative for abdominal distention, abdominal pain and blood in stool.   7. Endocrine: Negative for cold intolerance and heat intolerance.   8. Genitourinary: Negative for difficulty urinating, dysuria and frequency.   9. Musculoskeletal: Chronic bilateral knee arthralgias, associated cervical back pain and myalgias as per above.   10. Skin: Negative for color change, rash and wound.   11. Neurological: Negative for syncope, weakness and headaches.   12. Hematological: Negative for adenopathy. Does not bruise/bleed easily.   13. Psychiatric/Behavioral: Negative for confusion. The patient is not nervous/anxious.      Objective     Physical Exam   Vital Signs: /80   Pulse 80   Temp 96.9 °F (36.1 °C)   Ht 172.7 cm (68\")   Wt 98.9 kg (218 lb)   LMP  (LMP Unknown)   SpO2 99%   BMI 33.15 kg/m²     General Appearance: alert, oriented x 3, no acute distress.  Pleasant and interactive during questioning and examination.  Skin: warm and dry.   HEENT: Atraumatic.  pupils round and reactive to light and accommodation, oral mucosa pink and moist.  Nares patent without epistaxis.  External auditory canals are patent tympanic membranes intact.  Poor dentition.  Neck: supple, no JVD, trachea midline.  No thyromegaly.  Spastic paravertebral musculature noted to the cervical spine, more problematic to the right side.  This does lead to impaired sidebending and rotation.  Flexion/extension normal.  Lungs: CTA, unlabored breathing effort.  Heart: RRR, normal S1 and S2, no S3, no rub.  Abdomen: soft, non-tender, no palpable bladder, present bowel sounds to auscultation ×4.  No guarding or rigidity.  Extremities: no clubbing, cyanosis.  Good range of motion actively and passively.  Symmetric muscle strength and development.  Chronic gravity dependent edema noted to the lower extremities, does not extend above the medial tibias bilaterally.  Quadriceps mechanism " intact, significant crepitance noted on A/P range of motion testing.  Medial/lateral joint line tenderness noted to bilateral knees, slightly more pronounced on the left.  Jessica/Estrada sign negative.  No leg length discrepancies.  Normal dorsiflexion/plantar flexion of bilateral feet.  Patient does have a slightly antalgic gait.  Neuro: normal speech and mental status.  Cranial nerves II through XII intact.  No anosmia. DTR 2+; proprioception intact.  No focal motor/sensory deficits.    Assessment and Plan      Assessment:   Millie was seen today for follow-up.    Diagnoses and all orders for this visit:    Essential hypertension    Acquired hypothyroidism    Gastroesophageal reflux disease without esophagitis    Atrial fibrillation, persistent    Chronic anticoagulation    Tricompartment osteoarthritis of both knees    Macrocytosis    Vitamin B12 deficiency    Malaise and fatigue        Plan:  I have asked patient to go to the hospital today to have her labs repeated.    She is at her baseline with respect to mentation/cognition today, demonstrating continued ability to ambulate.    Her blood pressure is at goal, heart rate well controlled additionally.    Continue current dose of metoprolol, anticoagulation to continue.    Patient is hesitant to transition to levothyroxine due to mood irregularities numerous years ago when this was tried.    Continue PPI therapy.    Discussion Summary:    Discussed plan of care in detail with pt today; pt verb understanding and agrees.    I have reviewed and updated all copied forward information, as appropriate.  I attest to the accuracy and relevance of any unchanged information.    Follow up:  Return in about 4 days (around 9/1/2020) for Recheck.     There are no Patient Instructions on file for this visit.    Rajan Luis,   08/29/20  00:43          Please note that portions of this note may have been completed with a voice recognition program. Efforts were made to edit  the dictations, but occasionally words are mistranscribed.

## 2020-08-31 ENCOUNTER — TELEPHONE (OUTPATIENT)
Dept: FAMILY MEDICINE CLINIC | Facility: CLINIC | Age: 75
End: 2020-08-31

## 2020-08-31 NOTE — TELEPHONE ENCOUNTER
ERICK IS REQUESTING A CALL BACK TO TOUCH BASE ON PT.    ERICK WOULD LIKE TO KNOW WAS WHAT WAS DICUSSED,DIAGNOSED AND IF SHE NEEDS TO DO ANYTHING AS THE PT'S HOME HEALTH NURSE.    PLEASE ADVISE  [phone]

## 2020-09-02 NOTE — TELEPHONE ENCOUNTER
ERICK CALLED FROM [facility] TO FOLLOW UP ON HER ORIGINAL MESSAGE.  SHE HAS NOT HEARD FROM ANYONE AND WAS EXPECTING A CALLBACK IN REGARDS TO NEEDS FOR HOME HEALTH.    PLEASE CONTACT ERICK ASAP.    CALLBACK:  [phone]

## 2020-09-16 DIAGNOSIS — M43.6 SPASTIC TORTICOLLIS: ICD-10-CM

## 2020-09-17 RX ORDER — DIAZEPAM 2 MG/1
TABLET ORAL
Qty: 30 TABLET | Refills: 1 | Status: SHIPPED | OUTPATIENT
Start: 2020-09-17 | End: 2020-11-13

## 2020-09-18 ENCOUNTER — TELEPHONE (OUTPATIENT)
Dept: FAMILY MEDICINE CLINIC | Facility: CLINIC | Age: 75
End: 2020-09-18

## 2020-09-18 ENCOUNTER — HOSPITAL ENCOUNTER (OUTPATIENT)
Dept: MAMMOGRAPHY | Facility: HOSPITAL | Age: 75
End: 2020-09-18

## 2020-09-18 NOTE — TELEPHONE ENCOUNTER
"Marycarmen with [facility] called and wanted to let us know that she was just out seeing the patient and had to call 911. The patient was very \"loopy, and quite out of it\", nor was she able to stand up on her own. She lives alone and Marycarmen was concerned for her well being.   "

## 2020-09-21 ENCOUNTER — TELEPHONE (OUTPATIENT)
Dept: FAMILY MEDICINE CLINIC | Facility: CLINIC | Age: 75
End: 2020-09-21

## 2020-09-21 NOTE — TELEPHONE ENCOUNTER
Marycarmen called and said she had a nurse call the patient on Saturday to check on her. Patient had went to [facility], and they said she had a UTI and they were going to have meds sent to her pharmacy. Marycarmen called [facility] and there were no meds called in. She is on clindamycin due to a dental procedure last week, so she isn't sure if she should be taking a new antibiotic for her UTI. If provider needs her to see patient before her scheduled visit next week, please let her know. She would like to speak with the nurse, possibly after patients appt today.

## 2020-09-28 ENCOUNTER — OFFICE VISIT (OUTPATIENT)
Dept: FAMILY MEDICINE CLINIC | Facility: CLINIC | Age: 75
End: 2020-09-28

## 2020-09-28 ENCOUNTER — TELEPHONE (OUTPATIENT)
Dept: FAMILY MEDICINE CLINIC | Facility: CLINIC | Age: 75
End: 2020-09-28

## 2020-09-28 VITALS
TEMPERATURE: 97.3 F | HEART RATE: 90 BPM | DIASTOLIC BLOOD PRESSURE: 74 MMHG | SYSTOLIC BLOOD PRESSURE: 120 MMHG | BODY MASS INDEX: 33.15 KG/M2 | HEIGHT: 68 IN | OXYGEN SATURATION: 99 %

## 2020-09-28 DIAGNOSIS — M17.0 TRICOMPARTMENT OSTEOARTHRITIS OF BOTH KNEES: ICD-10-CM

## 2020-09-28 DIAGNOSIS — E03.9 ACQUIRED HYPOTHYROIDISM: ICD-10-CM

## 2020-09-28 DIAGNOSIS — Z79.01 CHRONIC ANTICOAGULATION: ICD-10-CM

## 2020-09-28 DIAGNOSIS — I48.19 ATRIAL FIBRILLATION, PERSISTENT (HCC): Primary | ICD-10-CM

## 2020-09-28 DIAGNOSIS — I10 ESSENTIAL HYPERTENSION: ICD-10-CM

## 2020-09-28 DIAGNOSIS — Z78.9 IMPAIRED MOBILITY AND ADLS: ICD-10-CM

## 2020-09-28 DIAGNOSIS — Z74.09 IMPAIRED MOBILITY AND ADLS: ICD-10-CM

## 2020-09-28 DIAGNOSIS — R53.83 MALAISE AND FATIGUE: ICD-10-CM

## 2020-09-28 DIAGNOSIS — R53.81 MALAISE AND FATIGUE: ICD-10-CM

## 2020-09-28 DIAGNOSIS — I50.32 CHRONIC DIASTOLIC (CONGESTIVE) HEART FAILURE (HCC): ICD-10-CM

## 2020-09-28 DIAGNOSIS — M47.22 CERVICAL RADICULOPATHY DUE TO DEGENERATIVE JOINT DISEASE OF SPINE: ICD-10-CM

## 2020-09-28 PROCEDURE — 99214 OFFICE O/P EST MOD 30 MIN: CPT | Performed by: FAMILY MEDICINE

## 2020-09-28 RX ORDER — NITROFURANTOIN 25; 75 MG/1; MG/1
CAPSULE ORAL
COMMUNITY
Start: 2020-09-21 | End: 2020-11-02

## 2020-09-28 RX ORDER — CALCIUM CARBONATE 160(400)MG
1 TABLET,CHEWABLE ORAL AS NEEDED
Qty: 1 EACH | Refills: 0 | Status: SHIPPED | OUTPATIENT
Start: 2020-09-28 | End: 2022-01-26

## 2020-09-28 NOTE — PROGRESS NOTES
"    Established Patient        Chief Complaint:   Chief Complaint   Patient presents with   • Follow-up (HTN, Hypothyroidism, anxiety and a-fib; patient needs an order for wheelchair and shower bench. Patient is currently taking macrobid for UTI that was diagnosed with at ER; patient c/o shortness of breath with activity; also c/o \"exteme\" fatigue)             Millie Richardson is a 74 y.o. female    History of Present Illness:   Here today for a follow-up visit concerning her hypertension, hypothyroidism, generalized anxiety and A. fib.  Patient was recently evaluated at a local emergency room where she was diagnosed with a UTI.  She was prescribed Macrobid, nearly completed that full course.  Denies any dysuria or hematuria.  Patient does admit to some malaise/fatigue, she describes as more noticeable during activity.  She denies any diaphoresis associated with it, no complaints of orthopnea.  Patient also request a shower seat and wheelchair.  She does ambulate predominantly with use of a walker.  Subjective     The following portions of the patient's history were reviewed and updated as appropriate: allergies, current medications, past family history, past medical history, past social history, past surgical history and problem list.    Allergies   Allergen Reactions   • Penicillins Anaphylaxis   • Sulfa Antibiotics Other (See Comments)     Patient states \"it made me want to go to bed, I didn't have a fever but I felt like I did\"        Review of Systems  1. Constitutional: Negative for fever. Negative for chills, diaphoresis.  Chronic malaise/fatigue and without unexpected weight change.   2. HENT: No dysphagia; no changes to vision/hearing/smell/taste; no epistaxis.  Neck pain as per above.  3. Eyes: Negative for redness and visual disturbance.   4. Respiratory: negative for shortness of breath. Negative for chest pain . Negative for cough and chest tightness.   5. Cardiovascular: Negative for chest pain and " "palpitations.   6. Gastrointestinal: Negative for abdominal distention, abdominal pain and blood in stool.   7. Endocrine: Negative for cold intolerance and heat intolerance.   8. Genitourinary: Negative for difficulty urinating, dysuria and frequency.   9. Musculoskeletal: Chronic bilateral knee arthralgias, associated cervical back pain and myalgias as per above.   10. Skin: Negative for color change, rash and wound.   11. Neurological: Negative for syncope, weakness and headaches.   12. Hematological: Negative for adenopathy. Does not bruise/bleed easily.   13. Psychiatric/Behavioral: Negative for confusion. The patient is not nervous/anxious.      Objective     Physical Exam   Vital Signs: /74   Pulse 90   Temp 97.3 °F (36.3 °C)   Ht 172.7 cm (68\")   LMP  (LMP Unknown)   SpO2 99%   BMI 33.15 kg/m²     General Appearance: alert, oriented x 3, no acute distress.  Pleasant and interactive during questioning and examination.  Skin: warm and dry.   HEENT: Atraumatic.  pupils round and reactive to light and accommodation, oral mucosa pink and moist.  Nares patent without epistaxis.  External auditory canals are patent tympanic membranes intact.  Poor dentition.  Neck: supple, no JVD, trachea midline.  No thyromegaly.  Spastic paravertebral musculature noted to the cervical spine, more problematic to the right side.  This does lead to impaired sidebending and rotation.  Flexion/extension normal.  Lungs: CTA, unlabored breathing effort.  Heart: RRR, normal S1 and S2, no S3, no rub.  Abdomen: soft, non-tender, no palpable bladder, present bowel sounds to auscultation ×4.  No guarding or rigidity.  Extremities: no clubbing, cyanosis.  Good range of motion actively and passively.  Symmetric muscle strength and development.  Chronic gravity dependent edema noted to the lower extremities, does not extend above the medial tibias bilaterally.  Quadriceps mechanism intact, significant crepitance noted on A/P range " of motion testing.  Medial/lateral joint line tenderness noted to bilateral knees, slightly more pronounced on the left.  Jessica/Estrada sign negative.  No leg length discrepancies.  Normal dorsiflexion/plantar flexion of bilateral feet.  Patient does have a slightly antalgic gait.  Utilizes walker for assistance with ambulation.  Neuro: normal speech and mental status.  Cranial nerves II through XII intact.  No anosmia. DTR 2+; proprioception intact.  No focal motor/sensory deficits.    Assessment and Plan      Assessment:   Millie was seen today for follow-up.    Diagnoses and all orders for this visit:    Atrial fibrillation, persistent  -     Ambulatory Referral to Cardiac Electrophysiology    Acquired hypothyroidism  -     TSH  -     T4, Free    Essential hypertension    Tricompartment osteoarthritis of both knees  -     Misc. Devices (Bath/Shower Seat) misc; 1 Units As Needed (bathing).  -     Lightweight Wheelchair  -     Misc. Devices (Rollator Ultra-Light) misc; 1 Units As Needed (ambulation assistance).    Cervical radiculopathy due to degenerative joint disease of spine  -     Misc. Devices (Bath/Shower Seat) misc; 1 Units As Needed (bathing).    Chronic diastolic (congestive) heart failure (CMS/HCC)    Chronic anticoagulation    Malaise and fatigue  -     Ambulatory Referral to Cardiac Electrophysiology  -     TSH  -     T4, Free    Impaired mobility and ADLs  -     Misc. Devices (Bath/Shower Seat) misc; 1 Units As Needed (bathing).  -     Lightweight Wheelchair  -     Misc. Devices (Rollator Ultra-Light) misc; 1 Units As Needed (ambulation assistance).        Plan:  Continue low-sodium/salt dietary intake.  Continue to monitor her weight daily.  No findings of acute exacerbation of her chronic diastolic heart failure at this time.    Do suspect that patient's dose of metoprolol is likely contributing to her perception of malaise/fatigue.  I have asked that she be seen by electrophysiology concerning more  definitive treatment of her chronic persistent atrial fibrillation.  Continue anticoagulation at this time.  She will need surveillance labs to monitor blood counts, most recent from emergency room visit yielded positive findings.    Prescription has been given for shower seat.    Updated thyroid function studies to be checked today, dosing adjustment we made as needed.    Continue core strengthening exercise program at home to aid in mobility and stability with ambulation.    I am not sure patient with reasonably benefit from a wheelchair use, unless this is for longer distances that she is unable to travel even with a Rollator.  I would prefer she use a Rollator with seat for the majority of her ambulation.    Discussion Summary:    Discussed plan of care in detail with pt today; pt verb understanding and agrees.    I have reviewed and updated all copied forward information, as appropriate.  I attest to the accuracy and relevance of any unchanged information.    Follow up:  Return in about 1 week (around 10/5/2020) for Medicare Wellness.     There are no Patient Instructions on file for this visit.    Rajan Luis DO  09/28/20  17:41 EDT          Please note that portions of this note may have been completed with a voice recognition program. Efforts were made to edit the dictations, but occasionally words are mistranscribed.

## 2020-09-28 NOTE — TELEPHONE ENCOUNTER
[facility] CALLED AND WANTED TO GIVE THE PROVIDER SOME INFO FOR PATIENTS APPT TODAY.   1-PATIENT IS IN NEED OF A SHOWER BENCH (SHE ALREADY HAS A SHOWER CHAIR). THIS WILL ASSIST HER WITH SHOWERS.   2-SHE ALSO ASKED ABOUT GETTING AN ORDER FOR A WHEELCHAIR. IT WILL BE USED FOR TRANSPORT, AS WELL AS ASSISTING HER THROUGHOUT THE HOUSE AS NEEDED (she wanted to make sure the order did not say just for transport).    3-SHE IS CURRENTLY TAKING MACROBID FOR HER RECENT UTI-WENT TO [facility]. SHE HAS A COUPLE DAYS WORTH LEFT.

## 2020-09-29 LAB
T4 FREE SERPL-MCNC: 0.88 NG/DL (ref 0.82–1.77)
TSH SERPL DL<=0.005 MIU/L-ACNC: 3.37 UIU/ML (ref 0.45–4.5)

## 2020-10-01 ENCOUNTER — TELEPHONE (OUTPATIENT)
Dept: OBSTETRICS AND GYNECOLOGY | Facility: CLINIC | Age: 75
End: 2020-10-01

## 2020-10-01 ENCOUNTER — TELEPHONE (OUTPATIENT)
Dept: FAMILY MEDICINE CLINIC | Facility: CLINIC | Age: 75
End: 2020-10-01

## 2020-10-01 DIAGNOSIS — N95.1 MENOPAUSAL SYMPTOMS: ICD-10-CM

## 2020-10-01 RX ORDER — ESTROGEN,CON/M-PROGEST ACET 0.3-1.5MG
1 TABLET ORAL DAILY
Qty: 84 TABLET | Refills: 0 | Status: CANCELLED | OUTPATIENT
Start: 2020-10-01

## 2020-10-01 NOTE — TELEPHONE ENCOUNTER
----- Message from Kristine Chin sent at 10/1/2020  1:32 PM EDT -----  Regarding: REFILL REQUEST  Contact: HOME HEALTH NURSE  THIS IS DR CARCAMO'S PT.  HER HOME HEALTH NURSE CALLED REQUESTING REFILL ON [facility].  PT IS SCHEDULED ON 10/30/20 WITH DR CARCAMO.  SHE USES [facility].  THANKS

## 2020-10-01 NOTE — TELEPHONE ENCOUNTER
Patient calling about labs they had done on 09/28/20.  Patient was told my Dr. Luis if she didn't hear anything in a couple days to call back and get the results.  Patient would like to know the results of her labs.  Please advise        Millie Richardson call back [phone]

## 2020-10-02 RX ORDER — LEVOTHYROXINE, LIOTHYRONINE 9.5; 2.25 UG/1; UG/1
TABLET ORAL
Qty: 90 TABLET | Refills: 1 | OUTPATIENT
Start: 2020-10-02

## 2020-10-02 NOTE — TELEPHONE ENCOUNTER
Gretchen need to find out why she has home health; patient will have to wait until seen if she has had changes in her health.  Thank you.

## 2020-10-06 DIAGNOSIS — N95.1 MENOPAUSAL SYMPTOMS: ICD-10-CM

## 2020-10-06 RX ORDER — ESTROGEN,CON/M-PROGEST ACET 0.3-1.5MG
1 TABLET ORAL DAILY
Qty: 84 TABLET | Refills: 0 | Status: SHIPPED | OUTPATIENT
Start: 2020-10-06 | End: 2020-11-02

## 2020-10-06 NOTE — TELEPHONE ENCOUNTER
Called and spoke with patient, she advised she is doing home health due to her vertigo, nothing else has changed in health.

## 2020-10-23 RX ORDER — PANTOPRAZOLE SODIUM 40 MG/1
TABLET, DELAYED RELEASE ORAL
Qty: 90 TABLET | Refills: 1 | Status: SHIPPED | OUTPATIENT
Start: 2020-10-23 | End: 2020-11-02 | Stop reason: SDUPTHER

## 2020-10-27 ENCOUNTER — TELEPHONE (OUTPATIENT)
Dept: FAMILY MEDICINE CLINIC | Facility: CLINIC | Age: 75
End: 2020-10-27

## 2020-10-27 NOTE — TELEPHONE ENCOUNTER
Ruba from [facility] called and stated that she would like to follow up on a fax she has sent twice to the office. She states that she had faxed a follow of care form as well as a nursing order to be reviewed signed and sent back for the patient. Ruba states she has not received anything so she wanted to confirm if this fax had been received. Please advise.     Shannon call back [phone]

## 2020-10-28 ENCOUNTER — OUTSIDE FACILITY SERVICE (OUTPATIENT)
Dept: FAMILY MEDICINE CLINIC | Facility: CLINIC | Age: 75
End: 2020-10-28

## 2020-10-28 PROCEDURE — G0179 MD RECERTIFICATION HHA PT: HCPCS | Performed by: FAMILY MEDICINE

## 2020-10-29 RX ORDER — ESTROGEN,CON/M-PROGEST ACET 0.3-1.5MG
1 TABLET ORAL DAILY
Qty: 90 TABLET | Refills: 4 | Status: CANCELLED | OUTPATIENT
Start: 2020-10-29

## 2020-10-30 ENCOUNTER — OFFICE VISIT (OUTPATIENT)
Dept: OBSTETRICS AND GYNECOLOGY | Facility: CLINIC | Age: 75
End: 2020-10-30

## 2020-10-30 VITALS
HEIGHT: 68 IN | SYSTOLIC BLOOD PRESSURE: 126 MMHG | BODY MASS INDEX: 33.04 KG/M2 | DIASTOLIC BLOOD PRESSURE: 72 MMHG | WEIGHT: 218 LBS

## 2020-10-30 DIAGNOSIS — Z12.31 ENCOUNTER FOR SCREENING MAMMOGRAM FOR BREAST CANCER: ICD-10-CM

## 2020-10-30 DIAGNOSIS — N95.1 MENOPAUSAL SYMPTOMS: ICD-10-CM

## 2020-10-30 DIAGNOSIS — Z01.419 ENCOUNTER FOR GYNECOLOGICAL EXAMINATION (GENERAL) (ROUTINE) WITHOUT ABNORMAL FINDINGS: Primary | ICD-10-CM

## 2020-10-30 PROCEDURE — G0101 CA SCREEN;PELVIC/BREAST EXAM: HCPCS | Performed by: OBSTETRICS & GYNECOLOGY

## 2020-11-02 ENCOUNTER — OFFICE VISIT (OUTPATIENT)
Dept: FAMILY MEDICINE CLINIC | Facility: CLINIC | Age: 75
End: 2020-11-02

## 2020-11-02 VITALS
TEMPERATURE: 97.1 F | HEIGHT: 68 IN | DIASTOLIC BLOOD PRESSURE: 80 MMHG | SYSTOLIC BLOOD PRESSURE: 138 MMHG | WEIGHT: 217 LBS | HEART RATE: 84 BPM | OXYGEN SATURATION: 97 % | BODY MASS INDEX: 32.89 KG/M2

## 2020-11-02 DIAGNOSIS — K21.9 GASTROESOPHAGEAL REFLUX DISEASE WITHOUT ESOPHAGITIS: Chronic | ICD-10-CM

## 2020-11-02 DIAGNOSIS — Z79.01 CHRONIC ANTICOAGULATION: ICD-10-CM

## 2020-11-02 DIAGNOSIS — I50.32 CHRONIC DIASTOLIC (CONGESTIVE) HEART FAILURE (HCC): ICD-10-CM

## 2020-11-02 DIAGNOSIS — I48.19 ATRIAL FIBRILLATION, PERSISTENT (HCC): ICD-10-CM

## 2020-11-02 DIAGNOSIS — I10 ESSENTIAL HYPERTENSION: Primary | ICD-10-CM

## 2020-11-02 DIAGNOSIS — F41.1 GENERALIZED ANXIETY DISORDER: ICD-10-CM

## 2020-11-02 DIAGNOSIS — E03.9 ACQUIRED HYPOTHYROIDISM: ICD-10-CM

## 2020-11-02 DIAGNOSIS — F33.9 MAJOR DEPRESSION, RECURRENT, CHRONIC (HCC): ICD-10-CM

## 2020-11-02 DIAGNOSIS — M47.22 CERVICAL RADICULOPATHY DUE TO DEGENERATIVE JOINT DISEASE OF SPINE: ICD-10-CM

## 2020-11-02 PROBLEM — E05.80 IATROGENIC HYPERTHYROIDISM: Status: RESOLVED | Noted: 2017-03-14 | Resolved: 2020-11-02

## 2020-11-02 PROCEDURE — 99214 OFFICE O/P EST MOD 30 MIN: CPT | Performed by: FAMILY MEDICINE

## 2020-11-02 RX ORDER — LEVOTHYROXINE, LIOTHYRONINE 19; 4.5 UG/1; UG/1
45 TABLET ORAL DAILY
Qty: 135 TABLET | Refills: 1 | Status: SHIPPED | OUTPATIENT
Start: 2020-11-02 | End: 2021-03-24 | Stop reason: SDUPTHER

## 2020-11-02 RX ORDER — LISINOPRIL 40 MG/1
40 TABLET ORAL DAILY
Qty: 90 TABLET | Refills: 1 | Status: SHIPPED | OUTPATIENT
Start: 2020-11-02 | End: 2021-07-15

## 2020-11-02 RX ORDER — VENLAFAXINE 100 MG/1
100 TABLET ORAL 2 TIMES DAILY
Qty: 180 TABLET | Refills: 1 | Status: SHIPPED | OUTPATIENT
Start: 2020-11-02 | End: 2021-03-01 | Stop reason: SDUPTHER

## 2020-11-02 RX ORDER — PANTOPRAZOLE SODIUM 40 MG/1
40 TABLET, DELAYED RELEASE ORAL DAILY
Qty: 90 TABLET | Refills: 1 | Status: SHIPPED | OUTPATIENT
Start: 2020-11-02 | End: 2021-11-29 | Stop reason: SDUPTHER

## 2020-11-02 RX ORDER — APIXABAN 5 MG/1
5 TABLET, FILM COATED ORAL EVERY 12 HOURS SCHEDULED
Qty: 180 TABLET | Refills: 1 | Status: SHIPPED | OUTPATIENT
Start: 2020-11-02 | End: 2021-01-26 | Stop reason: SDUPTHER

## 2020-11-02 RX ORDER — POTASSIUM CHLORIDE 20 MEQ/1
20 TABLET, EXTENDED RELEASE ORAL DAILY
Qty: 30 TABLET | Refills: 5 | Status: SHIPPED | OUTPATIENT
Start: 2020-11-02 | End: 2021-02-26 | Stop reason: SDUPTHER

## 2020-11-02 RX ORDER — METOPROLOL SUCCINATE 200 MG/1
200 TABLET, EXTENDED RELEASE ORAL DAILY
Qty: 90 TABLET | Refills: 1 | Status: SHIPPED | OUTPATIENT
Start: 2020-11-02 | End: 2021-07-15

## 2020-11-02 NOTE — PROGRESS NOTES
"    Established Patient        Chief Complaint:   Chief Complaint   Patient presents with   • Follow-up HTN/Hypothyroidism/JANET/Depression/A-Fib             Millie Richardson is a 75 y.o. female    History of Present Illness:   Here today for scheduled follow-up visit concerning her hypertension, hypothyroidism, generalized anxiety disorder with depression, atrial fibrillation and spastic torticollis.    She denies any SI/HI.  Reports good mood stability with current treatment regimen.  She denies any fever, chills or night sweats.  She denies any chest pain or any active shortness of breath.  States her spastic changes to her cervical spine have improved considerably, no longer taking diazepam.  She denies any aspiration or dysphagia.  Denies any hematuria.  No reports of any bright red blood or black or tarry stools.  Subjective     The following portions of the patient's history were reviewed and updated as appropriate: allergies, current medications, past family history, past medical history, past social history, past surgical history and problem list.    Allergies   Allergen Reactions   • Penicillins Anaphylaxis   • Sulfa Antibiotics Other (See Comments)     Patient states \"it made me want to go to bed, I didn't have a fever but I felt like I did\"        Review of Systems  1. Constitutional: Negative for fever. Negative for chills, diaphoresis.  Chronic malaise/fatigue and without unexpected weight change.   2. HENT: No dysphagia; no changes to vision/hearing/smell/taste; no epistaxis.  Neck pain as per above.  3. Eyes: Negative for redness and visual disturbance.   4. Respiratory: negative for shortness of breath. Negative for chest pain . Negative for cough and chest tightness.   5. Cardiovascular: Negative for chest pain and palpitations.   6. Gastrointestinal: Negative for abdominal distention, abdominal pain and blood in stool.   7. Endocrine: Negative for cold intolerance and heat intolerance.   8. Genitourinary: " "Negative for difficulty urinating, dysuria and frequency.   9. Musculoskeletal: Chronic bilateral knee arthralgias, associated cervical back pain and myalgias as per above.   10. Skin: Negative for color change, rash and wound.   11. Neurological: Negative for syncope, weakness and headaches.   12. Hematological: Negative for adenopathy. Does not bruise/bleed easily.   13. Psychiatric/Behavioral: Negative for confusion. The patient is not nervous/anxious.      Objective     Physical Exam   Vital Signs: /80   Pulse 84   Temp 97.1 °F (36.2 °C)   Ht 172.7 cm (68\")   Wt 98.4 kg (217 lb)   LMP  (LMP Unknown)   SpO2 97%   BMI 32.99 kg/m²     General Appearance: alert, oriented x 3, no acute distress.  Pleasant and interactive during questioning and examination.  Skin: warm and dry.   HEENT: Atraumatic.  pupils round and reactive to light and accommodation, oral mucosa pink and moist.  Nares patent without epistaxis.  External auditory canals are patent tympanic membranes intact.  Poor dentition.  Neck: supple, no JVD, trachea midline.  No thyromegaly.  Spastic paravertebral musculature noted to the cervical spine, more problematic to the right side.  This does lead to impaired sidebending and rotation.  Flexion/extension normal.  Lungs: CTA, unlabored breathing effort.  Heart: RRR, normal S1 and S2, no S3, no rub.  Abdomen: soft, non-tender, no palpable bladder, present bowel sounds to auscultation ×4.  No guarding or rigidity.  Extremities: no clubbing, cyanosis.  Good range of motion actively and passively.  Symmetric muscle strength and development.  Chronic gravity dependent edema noted to the lower extremities, does not extend above the medial tibias bilaterally.  Quadriceps mechanism intact, significant crepitance noted on A/P range of motion testing.  Medial/lateral joint line tenderness noted to bilateral knees, slightly more pronounced on the left.  Jessica/Estrada sign negative.  No leg length " discrepancies.  Normal dorsiflexion/plantar flexion of bilateral feet.  Patient does have a slightly antalgic gait.  Utilizes walker for assistance with ambulation.  Neuro: normal speech and mental status.  Cranial nerves II through XII intact.  No anosmia. DTR 2+; proprioception intact.  No focal motor/sensory deficits.    Advance Care Planning   ACP discussion was held with the patient during this visit. Patient does not have an advance directive, information provided.    Assessment and Plan      Assessment:   Diagnoses and all orders for this visit:    1. Essential hypertension (Primary)  -     lisinopril (PRINIVIL,ZESTRIL) 40 MG tablet; Take 1 tablet by mouth Daily.  Dispense: 90 tablet; Refill: 1  -     potassium chloride (K-DUR,KLOR-CON) 20 MEQ CR tablet; Take 1 tablet by mouth Daily.  Dispense: 30 tablet; Refill: 5    2. Acquired hypothyroidism  -     NP Thyroid 30 MG tablet; Take 1.5 tablets by mouth Daily.  Dispense: 135 tablet; Refill: 1    3. Gastroesophageal reflux disease without esophagitis  -     pantoprazole (PROTONIX) 40 MG EC tablet; Take 1 tablet by mouth Daily.  Dispense: 90 tablet; Refill: 1    4. Generalized anxiety disorder  -     venlafaxine (EFFEXOR) 100 MG tablet; Take 1 tablet by mouth 2 (Two) Times a Day.  Dispense: 180 tablet; Refill: 1    5. Major depression, recurrent, chronic (CMS/HCC)  -     venlafaxine (EFFEXOR) 100 MG tablet; Take 1 tablet by mouth 2 (Two) Times a Day.  Dispense: 180 tablet; Refill: 1    6. Cervical radiculopathy due to degenerative joint disease of spine    7. Chronic diastolic (congestive) heart failure (CMS/HCC)    8. Chronic anticoagulation  -     Eliquis 5 MG tablet tablet; Take 1 tablet by mouth Every 12 (Twelve) Hours.  Dispense: 180 tablet; Refill: 1    9. Atrial fibrillation, persistent  -     metoprolol succinate XL (TOPROL-XL) 200 MG 24 hr tablet; Take 1 tablet by mouth Daily.  Dispense: 90 tablet; Refill: 1        Plan:    Blood pressure and heart rate  are well controlled at present.  Continue anticoagulation.  Continue current rate controlling treatment regimen.  I have encouraged her to maintain appropriate hydration, as well as limit sodium/caffeine intake.    Continue current mood stabilizing treatment regimen.  No dose adjustment needed today.  Continue cognitive behavioral therapy additionally.    Continue PPI therapy.  Continue dietary/lifestyle modifications additionally.    Continue thyroid supplementation, periodic thyroid function studies with dose adjustment if indicated.  Discussion Summary:    Discussed plan of care in detail with pt today; pt verb understanding and agrees.    I have reviewed and updated all copied forward information, as appropriate.  I attest to the accuracy and relevance of any unchanged information.    Follow up:  Return in about 6 weeks (around 12/14/2020) for Recheck.     There are no Patient Instructions on file for this visit.    Rajan Luis, DO  11/02/20  15:33 EST          Please note that portions of this note may have been completed with a voice recognition program. Efforts were made to edit the dictations, but occasionally words are mistranscribed.

## 2020-11-04 ENCOUNTER — TELEPHONE (OUTPATIENT)
Dept: FAMILY MEDICINE CLINIC | Facility: CLINIC | Age: 75
End: 2020-11-04

## 2020-11-10 DIAGNOSIS — Z01.419 ENCOUNTER FOR GYNECOLOGICAL EXAMINATION (GENERAL) (ROUTINE) WITHOUT ABNORMAL FINDINGS: ICD-10-CM

## 2020-11-30 ENCOUNTER — OFFICE VISIT (OUTPATIENT)
Dept: BEHAVIORAL HEALTH | Facility: CLINIC | Age: 75
End: 2020-11-30

## 2020-11-30 VITALS
OXYGEN SATURATION: 95 % | TEMPERATURE: 96.6 F | BODY MASS INDEX: 33.22 KG/M2 | HEART RATE: 77 BPM | SYSTOLIC BLOOD PRESSURE: 142 MMHG | DIASTOLIC BLOOD PRESSURE: 82 MMHG | HEIGHT: 68 IN | WEIGHT: 219.2 LBS

## 2020-11-30 DIAGNOSIS — F33.9 MAJOR DEPRESSION, RECURRENT, CHRONIC (HCC): Primary | ICD-10-CM

## 2020-11-30 DIAGNOSIS — F51.04 PSYCHOPHYSIOLOGICAL INSOMNIA: ICD-10-CM

## 2020-11-30 PROCEDURE — 90792 PSYCH DIAG EVAL W/MED SRVCS: CPT | Performed by: NURSE PRACTITIONER

## 2020-11-30 RX ORDER — BUPROPION HYDROCHLORIDE 150 MG/1
TABLET ORAL
Qty: 30 TABLET | Refills: 2 | Status: SHIPPED | OUTPATIENT
Start: 2020-11-30 | End: 2021-01-04 | Stop reason: SDUPTHER

## 2020-11-30 RX ORDER — AMITRIPTYLINE HYDROCHLORIDE 25 MG/1
TABLET, FILM COATED ORAL
Qty: 60 TABLET | Refills: 3 | Status: SHIPPED | OUTPATIENT
Start: 2020-11-30 | End: 2021-03-01 | Stop reason: SDUPTHER

## 2020-11-30 NOTE — PROGRESS NOTES
"Patient Name: Millie Richardson  MRN: [id number]   :  1945     Referring Physician: Rajan Luis DO    Chief Complaint:     ICD-10-CM ICD-9-CM   1. Major depression, recurrent, chronic (CMS/HCC)  F33.9 296.30   2. Psychophysiological insomnia  F51.04 307.42       HPI:   Millie Richardson is a 75 y.o. female who is here today for initial evaluation of Depression and Insomnia .  Patient states she has a history of chronic depression.  Sometimes are worse than other times.  She has difficulty falling asleep however once she falls asleep she is able to stay asleep.  Has no family or friends close by.  Has a cat that gives great comfort.  Patient is close with her sister-in-law however she lives in [place].  They do call frequently.  Patient has been  for over 30 years.  States she was  for 15 years and he had a \"40s crisis and went crazy\".  Patient has no motivation or any desire to do anything.  Does state her current medication does help quite a bit but could be better.  Loves [place] and looking at decorations however is disheartened with not having anybody to spend her prepare [place] for.  Father had seasonal depression and maternal grandmother also had depression.  Patient is interested in therapy as well.    Past Medical History:   Past Medical History:   Diagnosis Date   • Anemia    • Anxiety    • Arthritis    • Atrial fibrillation (CMS/HCC)    • Blepharospasm    • Depression    • Gall stones    • GERD (gastroesophageal reflux disease)    • Heart attack (CMS/HCC)    • Heart murmur    • History of blood transfusion    • History of myocardial infarction    • History of pneumonia    • History of rheumatic fever    • Hypertension    • Hypothyroidism     recent TSH abnormal hyperthyroid (patient noncompliant with reduced dose of Haslett Thyroid).   • Irritable bowel syndrome (IBS)    • Migraines    • Myocardial infarction (CMS/HCC)    • Osteoporosis    • Ulcer        Past Surgical History: " "  Past Surgical History:   Procedure Laterality Date   • CHOLECYSTECTOMY  2001       Social History:   Social History     Socioeconomic History   • Marital status:      Spouse name: Not on file   • Number of children: Not on file   • Years of education: Not on file   • Highest education level: Not on file   Tobacco Use   • Smoking status: Never Smoker   • Smokeless tobacco: Never Used   Substance and Sexual Activity   • Alcohol use: No   • Drug use: No   • Sexual activity: Defer       Family History:  Family History   Problem Relation Age of Onset   • Colon cancer Father    • Hypertension Father    • Arthritis Father    • Heart attack Father    • Migraines Father    • Thyroid disease Mother    • Hypertension Mother    • Mental illness Mother    • Obesity Mother    • Cancer Brother    • Mental illness Brother        Allergy:  Allergies   Allergen Reactions   • Penicillins Anaphylaxis   • Sulfa Antibiotics Other (See Comments)     Patient states \"it made me want to go to bed, I didn't have a fever but I felt like I did\"        Current Medications:   Current Outpatient Medications   Medication Sig Dispense Refill   • amitriptyline (ELAVIL) 25 MG tablet 1-2 po q hs 60 tablet 3   • amLODIPine (NORVASC) 5 MG tablet Take 1 tablet by mouth Daily. 90 tablet 3   • buPROPion XL (Wellbutrin XL) 300 MG 24 hr tablet Take 1 tablet by mouth Daily. 30 tablet 5   • Eliquis 5 MG tablet tablet Take 1 tablet by mouth Every 12 (Twelve) Hours. 180 tablet 1   • lisinopril (PRINIVIL,ZESTRIL) 40 MG tablet Take 1 tablet by mouth Daily. 90 tablet 1   • metoprolol succinate XL (TOPROL-XL) 200 MG 24 hr tablet Take 1 tablet by mouth Daily. 90 tablet 1   • Misc. Devices (Bath/Shower Seat) misc 1 Units As Needed (bathing). 1 each 0   • Misc. Devices (Rollator Ultra-Light) misc 1 Units As Needed (ambulation assistance). 1 each 0   • NP Thyroid 30 MG tablet Take 1.5 tablets by mouth Daily. 135 tablet 1   • pantoprazole (PROTONIX) 40 MG EC " tablet Take 1 tablet by mouth Daily. 90 tablet 1   • potassium chloride (K-DUR,KLOR-CON) 20 MEQ CR tablet Take 1 tablet by mouth Daily. 30 tablet 5   • venlafaxine (EFFEXOR) 100 MG tablet Take 1 tablet by mouth 2 (Two) Times a Day. 180 tablet 1   • buPROPion XL (Wellbutrin XL) 150 MG 24 hr tablet One po daily With 300mg 30 tablet 2     No current facility-administered medications for this visit.        Lab Results:   Office Visit on 09/28/2020   Component Date Value Ref Range Status   • TSH 09/28/2020 3.370  0.450 - 4.500 uIU/mL Final   • Free T4 09/28/2020 0.88  0.82 - 1.77 ng/dL Final       Review of Symptoms:   Review of Systems   Constitutional: Negative for activity change, appetite change, fatigue, unexpected weight gain and unexpected weight loss.   Respiratory: Negative for shortness of breath and wheezing.    Gastrointestinal: Negative for constipation, diarrhea, nausea and vomiting.   Musculoskeletal: Negative for gait problem.   Skin: Negative for dry skin and rash.   Neurological: Negative for dizziness, speech difficulty, weakness, light-headedness, headache, memory problem and confusion.   Psychiatric/Behavioral: Positive for dysphoric mood, sleep disturbance and depressed mood. Negative for agitation, behavioral problems, decreased concentration, hallucinations, self-injury, suicidal ideas, negative for hyperactivity and stress. The patient is not nervous/anxious.        Physical Exam:   Physical Exam  Vitals signs and nursing note reviewed.   Constitutional:       General: She is not in acute distress.     Appearance: She is well-developed. She is not diaphoretic.   HENT:      Head: Normocephalic and atraumatic.   Eyes:      Conjunctiva/sclera: Conjunctivae normal.   Neck:      Musculoskeletal: Full passive range of motion without pain and normal range of motion.   Cardiovascular:      Rate and Rhythm: Normal rate.   Pulmonary:      Effort: Pulmonary effort is normal. No respiratory distress.  "  Musculoskeletal: Normal range of motion.   Skin:     General: Skin is warm and dry.   Neurological:      Mental Status: She is alert and oriented to person, place, and time.   Psychiatric:         Mood and Affect: Mood is depressed. Mood is not anxious. Affect is not labile, blunt, angry or inappropriate.         Speech: Speech is not rapid and pressured or tangential.         Behavior: Behavior normal. Behavior is not agitated, slowed, aggressive, withdrawn, hyperactive or combative. Behavior is cooperative.         Thought Content: Thought content normal. Thought content is not paranoid or delusional. Thought content does not include homicidal or suicidal ideation. Thought content does not include homicidal or suicidal plan.         Judgment: Judgment normal.       Blood pressure 142/82, pulse 77, temperature 96.6 °F (35.9 °C), height 172.7 cm (68\"), weight 99.4 kg (219 lb 3.2 oz), SpO2 95 %, not currently breastfeeding.  Body mass index is 33.33 kg/m².     Mental Status Exam:   Appearance: appropriate  Hygiene:   good  Cooperation:  Cooperative  Eye Contact:  Good  Psychomotor Behavior:  Appropriate  Mood:depressed, dysthymic and sad  Affect:  Full range  Hopelessness: Denies  Speech:  Normal  Thought Process:  Goal directed  Thought Content:  Normal  Suicidal:  None  Homicidal:  None  Hallucinations:  None  Delusion:  None  Memory:  Intact  Orientation:  Person, Place, Time and Situation  Reliability:  good  Insight:  Good  Judgement:  Good  Impulse Control:  Good  Physical/Medical Issues:  No     PHQ-9 Depression Screening  Little interest or pleasure in doing things? 3   Feeling down, depressed, or hopeless? 3   Trouble falling or staying asleep, or sleeping too much? 3   Feeling tired or having little energy? 3   Poor appetite or overeating? 3   Feeling bad about yourself - or that you are a failure or have let yourself or your family down? 1   Trouble concentrating on things, such as reading the newspaper " or watching television? 3   Moving or speaking so slowly that other people could have noticed? Or the opposite - being so fidgety or restless that you have been moving around a lot more than usual? 0   Thoughts that you would be better off dead, or of hurting yourself in some way? 3   PHQ-9 Total Score 22   If you checked off any problems, how difficult have these problems made it for you to do your work, take care of things at home, or get along with other people?        Assessment/Plan:   Diagnoses and all orders for this visit:    1. Major depression, recurrent, chronic (CMS/HCC) (Primary)  -     buPROPion XL (Wellbutrin XL) 150 MG 24 hr tablet; One po daily With 300mg  Dispense: 30 tablet; Refill: 2  -     Ambulatory Referral to Behavioral Health    2. Psychophysiological insomnia  -     amitriptyline (ELAVIL) 25 MG tablet; 1-2 po q hs  Dispense: 60 tablet; Refill: 3  -     Ambulatory Referral to Behavioral Health    Increase Wellbutrin to 300 mg and a 150 mg tablet p.o. daily.  Increase amitriptyline to 25 mg 1 or 2 at bedtime.  Patient is very interested in therapy at this time.  Refer to therapy in [place] with [facility] as therapist at this location does not accept Medicare.    A psychological evaluation was conducted in order to assess past and current level of functioning. Areas assessed included, but were not limited to: perception of social support, perception of ability to face and deal with challenges in life (positive functioning), anxiety symptoms, depressive symptoms, perspective on beliefs/belief system, coping skills for stress, intelligence level,  Therapeutic rapport was established. Interventions conducted today were geared towards incorporating medication management along with support for continued therapy. Education was also provided as to the med management with this provider and what to expect in subsequent sessions.    We discussed risks, benefits,goals and side effects of the above medication  and the patient was agreeable with the plan.Patient was educated on the importance of compliance with treatment and follow-up appointments. Patient is aware to contact the [facility] with any worsening of symptoms. To call for questions or concerns and return early if necessary. Patent is agreeable to go to the Emergency Department or call 911 should they begin SI/HI.     Treatment Plan:   Discussed risks, benefits, and alternatives of medication. Encouraged healthy habits (eating, exercise and sleep). Call if any questions or problems arise. Medication reconciled. Controlled substance monitoring report reviewed. Provided psychoeducation.. Discussed coping strategies and current stressors. Set appropriate boundaries and limits for patient's well-being. Use distraction techniques to improve symptoms. Access support networks.      Return in about 4 weeks (around 12/28/2020) for Follow Up 30 min.    Gretchen Jacobson, MARIA L

## 2020-12-02 ENCOUNTER — TELEPHONE (OUTPATIENT)
Dept: FAMILY MEDICINE CLINIC | Facility: CLINIC | Age: 75
End: 2020-12-02

## 2020-12-02 NOTE — TELEPHONE ENCOUNTER
[facility] NURSE CALLED REQUESTING ORDER FOR EVERY OTHER WEEK FOR HOME HEALTH    PLEASE ADVISE AT: [phone]

## 2020-12-07 ENCOUNTER — OFFICE VISIT (OUTPATIENT)
Dept: FAMILY MEDICINE CLINIC | Facility: CLINIC | Age: 75
End: 2020-12-07

## 2020-12-07 VITALS
SYSTOLIC BLOOD PRESSURE: 130 MMHG | TEMPERATURE: 96.8 F | OXYGEN SATURATION: 97 % | HEART RATE: 90 BPM | BODY MASS INDEX: 33.04 KG/M2 | WEIGHT: 218 LBS | HEIGHT: 68 IN | DIASTOLIC BLOOD PRESSURE: 80 MMHG

## 2020-12-07 DIAGNOSIS — K21.9 GASTROESOPHAGEAL REFLUX DISEASE WITHOUT ESOPHAGITIS: Chronic | ICD-10-CM

## 2020-12-07 DIAGNOSIS — I50.32 CHRONIC DIASTOLIC (CONGESTIVE) HEART FAILURE (HCC): ICD-10-CM

## 2020-12-07 DIAGNOSIS — I48.19 ATRIAL FIBRILLATION, PERSISTENT (HCC): ICD-10-CM

## 2020-12-07 DIAGNOSIS — F41.1 GENERALIZED ANXIETY DISORDER: ICD-10-CM

## 2020-12-07 DIAGNOSIS — Z00.00 ROUTINE GENERAL MEDICAL EXAMINATION AT HEALTH CARE FACILITY: Primary | ICD-10-CM

## 2020-12-07 DIAGNOSIS — Z79.01 CHRONIC ANTICOAGULATION: ICD-10-CM

## 2020-12-07 DIAGNOSIS — E03.9 ACQUIRED HYPOTHYROIDISM: ICD-10-CM

## 2020-12-07 DIAGNOSIS — I10 ESSENTIAL HYPERTENSION: ICD-10-CM

## 2020-12-07 PROCEDURE — 99214 OFFICE O/P EST MOD 30 MIN: CPT | Performed by: FAMILY MEDICINE

## 2020-12-07 PROCEDURE — G0438 PPPS, INITIAL VISIT: HCPCS | Performed by: FAMILY MEDICINE

## 2020-12-07 NOTE — PATIENT INSTRUCTIONS
Breast Self-Awareness  Breast self-awareness means being familiar with how your breasts look and feel. It involves checking your breasts regularly and reporting any changes to your health care provider.  Practicing breast self-awareness is important. Sometimes changes may not be harmful (are benign), but sometimes a change in your breasts can be a sign of a serious medical problem. It is important to learn how to do this procedure correctly so that you can catch problems early, when treatment is more likely to be successful. All women should practice breast self-awareness, including women who have had breast implants.  What you need:  · A mirror.  · A well-lit room.  How to do a breast self-exam  A breast self-exam is one way to learn what is normal for your breasts and whether your breasts are changing. To do a breast self-exam:  Look for changes    1. Remove all the clothing above your waist.  2.  front of a mirror in a room with good lighting.  3. Put your hands on your hips.  4. Push your hands firmly downward.  5. Compare your breasts in the mirror. Look for differences between them (asymmetry), such as:  ? Differences in shape.  ? Differences in size.  ? Puckers, dips, and bumps in one breast and not the other.  6. Look at each breast for changes in the skin, such as:  ? Redness.  ? Scaly areas.  7. Look for changes in your nipples, such as:  ? Discharge.  ? Bleeding.  ? Dimpling.  ? Redness.  ? A change in position.  Feel for changes  Carefully feel your breasts for lumps and changes. It is best to do this while lying on your back on the floor, and again while sitting or standing in the tub or shower with soapy water on your skin. Feel each breast in the following way:  1. Place the arm on the side of the breast you are examining above your head.  2. Feel your breast with the other hand.  3. Start in the nipple area and make ¾-inch (2 cm) overlapping circles to feel your breast. Use the pads of  your three middle fingers to do this. Apply light pressure, then medium pressure, then firm pressure. The light pressure will allow you to feel the tissue closest to the skin. The medium pressure will allow you to feel the tissue that is a little deeper. The firm pressure will allow you to feel the tissue close to the ribs.  4. Continue the overlapping circles, moving downward over the breast until you feel your ribs below your breast.  5. Move one finger-width toward the center of the body. Continue to use the ¾-inch (2 cm) overlapping circles to feel your breast as you move slowly up toward your collarbone.  6. Continue the up-and-down exam using all three pressures until you reach your armpit.    Write down what you find  Writing down what you find can help you remember what to discuss with your health care provider. Write down:  · What is normal for each breast.  · Any changes that you find in each breast, including:  ? The kind of changes you find.  ? Any pain or tenderness.  ? Size and location of any lumps.  · Where you are in your menstrual cycle, if you are still menstruating.  General tips and recommendations  · Examine your breasts every month.  · If you are breastfeeding, the best time to examine your breasts is after a feeding or after using a breast pump.  · If you menstruate, the best time to examine your breasts is 5-7 days after your period. Breasts are generally lumpier during menstrual periods, and it may be more difficult to notice changes.  · With time and practice, you will become more familiar with the variations in your breasts and more comfortable with the exam.  Contact a health care provider if you:  · See a change in the shape or size of your breasts or nipples.  · See a change in the skin of your breast or nipples, such as a reddened or scaly area.  · Have unusual discharge from your nipples.  · Find a lump or thick area that was not there before.  · Have pain in your breasts.  · Have  any concerns related to your breast health.  Summary  · Breast self-awareness includes looking for physical changes in your breasts, as well as feeling for any changes within your breasts.  · Breast self-awareness should be performed in front of a mirror in a well-lit room.  · You should examine your breasts every month. If you menstruate, the best time to examine your breasts is 5-7 days after your menstrual period.  · Let your health care provider know of any changes you notice in your breasts, including changes in size, changes on the skin, pain or tenderness, or unusual fluid from your nipples.  This information is not intended to replace advice given to you by your health care provider. Make sure you discuss any questions you have with your health care provider.  Document Revised: 08/06/2019 Document Reviewed: 08/06/2019  Elsevier Patient Education © 2020 [employer]      Breast Self-Awareness  Breast self-awareness is knowing how your breasts look and feel. Doing breast self-awareness is important. It allows you to catch a breast problem early while it is still small and can be treated. All women should do breast self-awareness, including women who have had breast implants. Tell your doctor if you notice a change in your breasts.  What you need:  · A mirror.  · A well-lit room.  How to do a breast self-exam  A breast self-exam is one way to learn what is normal for your breasts and to check for changes. To do a breast self-exam:  Look for changes    1. Take off all the clothes above your waist.  2.  front of a mirror in a room with good lighting.  3. Put your hands on your hips.  4. Push your hands down.  5. Look at your breasts and nipples in the mirror to see if one breast or nipple looks different from the other. Check to see if:  ? The shape of one breast is different.  ? The size of one breast is different.  ? There are wrinkles, dips, and bumps in one breast and not the other.  6. Look at each  breast for changes in the skin, such as:  ? Redness.  ? Scaly areas.  7. Look for changes in your nipples, such as:  ? Liquid around the nipples.  ? Bleeding.  ? Dimpling.  ? Redness.  ? A change in where the nipples are.  Feel for changes    1. Lie on your back on the floor.  2. Feel each breast. To do this, follow these steps:  ? Pick a breast to feel.  ? Put the arm closest to that breast above your head.  ? Use your other arm to feel the nipple area of your breast. Feel the area with the pads of your three middle fingers by making small circles with your fingers. For the first [place], press lightly. For the second [place], press harder. For the third [place], press even harder.  ? Keep making circles with your fingers at the different pressures as you move down your breast. Stop when you feel your ribs.  ? Move your fingers a little toward the center of your body.  ? Start making circles with your fingers again, this time going up until you reach your collarbone.  ? Keep making up-and-down circles until you reach your armpit. Remember to keep using the three pressures.  ? Feel the other breast in the same way.  3. Sit or  the tub or shower.  4. With soapy water on your skin, feel each breast the same way you did in step 2 when you were lying on the floor.  Write down what you find  Writing down what you find can help you remember what to tell your doctor. Write down:  · What is normal for each breast.  · Any changes you find in each breast, including:  ? The kind of changes you find.  ? Whether you have pain.  ? Size and location of any lumps.  · When you last had your menstrual period.  General tips  · Check your breasts every month.  · If you are breastfeeding, the best time to check your breasts is after you feed your baby or after you use a breast pump.  · If you get menstrual periods, the best time to check your breasts is 5-7 days after your menstrual period is over.  · With time, you will become  comfortable with the self-exam, and you will begin to know if there are changes in your breasts.  Contact a doctor if you:  · See a change in the shape or size of your breasts or nipples.  · See a change in the skin of your breast or nipples, such as red or scaly skin.  · Have fluid coming from your nipples that is not normal.  · Find a lump or thick area that was not there before.  · Have pain in your breasts.  · Have any concerns about your breast health.  Summary  · Breast self-awareness includes looking for changes in your breasts, as well as feeling for changes within your breasts.  · Breast self-awareness should be done in front of a mirror in a well-lit room.  · You should check your breasts every month. If you get menstrual periods, the best time to check your breasts is 5-7 days after your menstrual period is over.  · Let your doctor know of any changes you see in your breasts, including changes in size, changes on the skin, pain or tenderness, or fluid from your nipples that is not normal.  This information is not intended to replace advice given to you by your health care provider. Make sure you discuss any questions you have with your health care provider.  Document Revised: 08/06/2019 Document Reviewed: 08/06/2019  [employer] Patient Education © 2020 [employer]      Exercising to Lose Weight  Exercise is structured, repetitive physical activity to improve fitness and health. Getting regular exercise is important for everyone. It is especially important if you are overweight. Being overweight increases your risk of heart disease, stroke, diabetes, high blood pressure, and several types of cancer. Reducing your calorie intake and exercising can help you lose weight.  Exercise is usually categorized as moderate or vigorous intensity. To lose weight, most people need to do a certain amount of moderate-intensity or vigorous-intensity exercise each week.  Moderate-intensity exercise    Moderate-intensity  exercise is any activity that gets you moving enough to burn at least three times more energy (calories) than if you were sitting.  Examples of moderate exercise include:  · Walking a mile in 15 minutes.  · Doing light yard work.  · Biking at an easy pace.  Most people should get at least 150 minutes (2 hours and 30 minutes) a week of moderate-intensity exercise to maintain their body weight.  Vigorous-intensity exercise  Vigorous-intensity exercise is any activity that gets you moving enough to burn at least six times more calories than if you were sitting. When you exercise at this intensity, you should be working hard enough that you are not able to carry on a conversation.  Examples of vigorous exercise include:  · Running.  · Playing a team sport, such as football, basketball, and soccer.  · Jumping rope.  Most people should get at least 75 minutes (1 hour and 15 minutes) a week of vigorous-intensity exercise to maintain their body weight.  How can exercise affect me?  When you exercise enough to burn more calories than you eat, you lose weight. Exercise also reduces body fat and builds muscle. The more muscle you have, the more calories you burn. Exercise also:  · Improves mood.  · Reduces stress and tension.  · Improves your overall fitness, flexibility, and endurance.  · Increases bone strength.  The amount of exercise you need to lose weight depends on:  · Your age.  · The type of exercise.  · Any health conditions you have.  · Your overall physical ability.  Talk to your health care provider about how much exercise you need and what types of activities are safe for you.  What actions can I take to lose weight?  Nutrition    · Make changes to your diet as told by your health care provider or diet and nutrition specialist (dietitian). This may include:  ? Eating fewer calories.  ? Eating more protein.  ? Eating less unhealthy fats.  ? Eating a diet that includes fresh fruits and vegetables, whole grains,  low-fat dairy products, and lean protein.  ? Avoiding foods with added fat, salt, and sugar.  · Drink plenty of water while you exercise to prevent dehydration or heat stroke.  Activity  · Choose an activity that you enjoy and set realistic goals. Your health care provider can help you make an exercise plan that works for you.  · Exercise at a moderate or vigorous intensity most days of the week.  ? The intensity of exercise may vary from person to person. You can tell how intense a workout is for you by paying attention to your breathing and heartbeat. Most people will notice their breathing and heartbeat get faster with more intense exercise.  · Do resistance training twice each week, such as:  ? Push-ups.  ? Sit-ups.  ? Lifting weights.  ? Using resistance bands.  · Getting short amounts of exercise can be just as helpful as long structured periods of exercise. If you have trouble finding time to exercise, try to include exercise in your daily routine.  ? Get up, stretch, and walk around every 30 minutes throughout the day.  ? Go for a walk during your lunch break.  ? Park your car farther away from your destination.  ? If you take public transportation, get off one stop early and walk the rest of the way.  ? Make phone calls while standing up and walking around.  ? Take the stairs instead of elevators or escalators.  · Wear comfortable clothes and shoes with good support.  · Do not exercise so much that you hurt yourself, feel dizzy, or get very short of breath.  Where to find more information  · [place] Department of Health and Human Services: www.hhs.gov  · Centers for Disease Control and Prevention (CDC): www.cdc.gov  Contact a health care provider:  · Before starting a new exercise program.  · If you have questions or concerns about your weight.  · If you have a medical problem that keeps you from exercising.  Get help right away if you have any of the following while  exercising:  · Injury.  · Dizziness.  · Difficulty breathing or shortness of breath that does not go away when you stop exercising.  · Chest pain.  · Rapid heartbeat.  Summary  · Being overweight increases your risk of heart disease, stroke, diabetes, high blood pressure, and several types of cancer.  · Losing weight happens when you burn more calories than you eat.  · Reducing the amount of calories you eat in addition to getting regular moderate or vigorous exercise each week helps you lose weight.  This information is not intended to replace advice given to you by your health care provider. Make sure you discuss any questions you have with your health care provider.  Document Revised: 12/31/2018 Document Reviewed: 12/31/2018  [employer] Patient Education © 2020 [employer]      Exercising to Stay Healthy  To become healthy and stay healthy, it is recommended that you do moderate-intensity and vigorous-intensity exercise. You can tell that you are exercising at a moderate intensity if your heart starts beating faster and you start breathing faster but can still hold a conversation. You can tell that you are exercising at a vigorous intensity if you are breathing much harder and faster and cannot hold a conversation while exercising.  Exercising regularly is important. It has many health benefits, such as:  · Improving overall fitness, flexibility, and endurance.  · Increasing bone density.  · Helping with weight control.  · Decreasing body fat.  · Increasing muscle strength.  · Reducing stress and tension.  · Improving overall health.  How often should I exercise?  Choose an activity that you enjoy, and set realistic goals. Your health care provider can help you make an activity plan that works for you.  Exercise regularly as told by your health care provider. This may include:  · Doing strength training two times a week, such as:  ? Lifting weights.  ? Using resistance  bands.  ? Push-ups.  ? Sit-ups.  ? Yoga.  · Doing a certain intensity of exercise for a given amount of time. Choose from these options:  ? A total of 150 minutes of moderate-intensity exercise every week.  ? A total of 75 minutes of vigorous-intensity exercise every week.  ? A mix of moderate-intensity and vigorous-intensity exercise every week.  Children, pregnant women, people who have not exercised regularly, people who are overweight, and older adults may need to talk with a health care provider about what activities are safe to do. If you have a medical condition, be sure to talk with your health care provider before you start a new exercise program.  What are some exercise ideas?  Moderate-intensity exercise ideas include:  · Walking 1 mile (1.6 km) in about 15 minutes.  · Biking.  · Hiking.  · Golfing.  · Dancing.  · Water aerobics.  Vigorous-intensity exercise ideas include:  · Walking 4.5 miles (7.2 km) or more in about 1 hour.  · Jogging or running 5 miles (8 km) in about 1 hour.  · Biking 10 miles (16.1 km) or more in about 1 hour.  · Lap swimming.  · Roller-skating or in-line skating.  · Cross-country skiing.  · Vigorous competitive sports, such as football, basketball, and soccer.  · Jumping rope.  · Aerobic dancing.  What are some everyday activities that can help me to get exercise?  · Yard work, such as:  ? Pushing a .  ? Raking and bagging leaves.  · Washing your car.  · Pushing a stroller.  · Shoveling snow.  · Gardening.  · Washing windows or floors.  How can I be more active in my day-to-day activities?  · Use stairs instead of an elevator.  · Take a walk during your lunch break.  · If you drive, park your car farther away from your work or school.  · If you take public transportation, get off one stop early and walk the rest of the way.  · Stand up or walk around during all of your indoor phone calls.  · Get up, stretch, and walk around every 30 minutes throughout the day.  · Enjoy  exercise with a friend. Support to continue exercising will help you keep a regular routine of activity.  What guidelines can I follow while exercising?  · Before you start a new exercise program, talk with your health care provider.  · Do not exercise so much that you hurt yourself, feel dizzy, or get very short of breath.  · Wear comfortable clothes and wear shoes with good support.  · Drink plenty of water while you exercise to prevent dehydration or heat stroke.  · Work out until your breathing and your heartbeat get faster.  Where to find more information  · [place] Department of Health and Human Services: www.hhs.gov  · Centers for Disease Control and Prevention (CDC): www.cdc.gov  Summary  · Exercising regularly is important. It will improve your overall fitness, flexibility, and endurance.  · Regular exercise also will improve your overall health. It can help you control your weight, reduce stress, and improve your bone density.  · Do not exercise so much that you hurt yourself, feel dizzy, or get very short of breath.  · Before you start a new exercise program, talk with your health care provider.  This information is not intended to replace advice given to you by your health care provider. Make sure you discuss any questions you have with your health care provider.  Document Revised: 11/30/2018 Document Reviewed: 11/08/2018  [employer] Patient Education © 2020 [employer]      Fall Prevention in the Home, Adult  Falls can cause injuries and can affect people from all age groups. There are many simple things that you can do to make your home safe and to help prevent falls. Ask for help when making these changes, if needed.  What actions can I take to prevent falls?  General instructions  · Use good lighting in all rooms. Replace any light bulbs that burn out.  · Turn on lights if it is dark. Use night-lights.  · Place frequently used items in easy-to-reach places. Lower the shelves around your home if  necessary.  · Set up furniture so that there are clear paths around it. Avoid moving your furniture around.  · Remove throw rugs and other tripping hazards from the floor.  · Avoid walking on wet floors.  · Fix any uneven floor surfaces.  · Add color or contrast paint or tape to grab bars and handrails in your home. Place contrasting color strips on the first and last steps of stairways.  · When you use a stepladder, make sure that it is completely opened and that the sides are firmly locked. Have someone hold the ladder while you are using it. Do not climb a closed stepladder.  · Be aware of any and all pets.  What can I do in the bathroom?         · Keep the floor dry. Immediately clean up any water that spills onto the floor.  · Remove soap buildup in the tub or shower on a regular basis.  · Use non-skid mats or decals on the floor of the tub or shower.  · Attach bath mats securely with double-sided, non-slip rug tape.  · If you need to sit down while you are in the shower, use a plastic, non-slip stool.  · Install grab bars by the toilet and in the tub and shower. Do not use towel bars as grab bars.  What can I do in the bedroom?  · Make sure that a bedside light is easy to reach.  · Do not use oversized bedding that drapes onto the floor.  · Have a firm chair that has side arms to use for getting dressed.  What can I do in the kitchen?  · Clean up any spills right away.  · If you need to reach for something above you, use a sturdy step stool that has a grab bar.  · Keep electrical cables out of the way.  · Do not use floor polish or wax that makes floors slippery. If you must use wax, make sure that it is non-skid floor wax.  What can I do in the stairways?  · Do not leave any items on the stairs.  · Make sure that you have a light switch at the top of the stairs and the bottom of the stairs. Have them installed if you do not have them.  · Make sure that there are handrails on both sides of the stairs. Fix  handrails that are broken or loose. Make sure that handrails are as long as the stairways.  · Install non-slip stair treads on all stairs in your home.  · Avoid having throw rugs at the top or bottom of stairways, or secure the rugs with carpet tape to prevent them from moving.  · Choose a carpet design that does not hide the edge of steps on the stairway.  · Check any carpeting to make sure that it is firmly attached to the stairs. Fix any carpet that is loose or worn.  What can I do on the outside of my home?  · Use bright outdoor lighting.  · Regularly repair the edges of walkways and driveways and fix any cracks.  · Remove high doorway thresholds.  · Trim any shrubbery on the main path into your home.  · Regularly check that handrails are securely fastened and in good repair. Both sides of any steps should have handrails.  · Install guardrails along the edges of any raised decks or porches.  · Clear walkways of debris and clutter, including tools and rocks.  · Have leaves, snow, and ice cleared regularly.  · Use sand or salt on walkways during winter months.  · In the garage, clean up any spills right away, including grease or oil spills.  What other actions can I take?  · Wear closed-toe shoes that fit well and support your feet. Wear shoes that have rubber soles or low heels.  · Use mobility aids as needed, such as canes, walkers, scooters, and crutches.  · Review your medicines with your health care provider. Some medicines can cause dizziness or changes in blood pressure, which increase your risk of falling.  Talk with your health care provider about other ways that you can decrease your risk of falls. This may include working with a physical therapist or  to improve your strength, balance, and endurance.  Where to find more information  · Centers for Disease Control and PreventionDENIZ: https://www.cdc.gov  · National [place] on Aging: https://qh5titd.tres.nih.gov  Contact a health care provider  if:  · You are afraid of falling at home.  · You feel weak, drowsy, or dizzy at home.  · You fall at home.  Summary  · There are many simple things that you can do to make your home safe and to help prevent falls.  · Ways to make your home safe include removing tripping hazards and installing grab bars in the bathroom.  · Ask for help when making these changes in your home.  This information is not intended to replace advice given to you by your health care provider. Make sure you discuss any questions you have with your health care provider.  Document Revised: 11/30/2018 Document Reviewed: 08/02/2018  [employer] Patient Education © 2020 [employer]      Fall Prevention in the Home, Adult  Falls can cause injuries. They can happen to people of all ages. There are many things you can do to make your home safe and to help prevent falls. Ask for help when making these changes, if needed.  What actions can I take to prevent falls?  General Instructions  · Use good lighting in all rooms. Replace any light bulbs that burn out.  · Turn on the lights when you go into a dark area. Use night-lights.  · Keep items that you use often in easy-to-reach places. Lower the shelves around your home if necessary.  · Set up your furniture so you have a clear path. Avoid moving your furniture around.  · Do not have throw rugs and other things on the floor that can make you trip.  · Avoid walking on wet floors.  · If any of your floors are uneven, fix them.  · Add color or contrast paint or tape to clearly jelly and help you see:  ? Any grab bars or handrails.  ? First and last steps of stairways.  ? Where the edge of each step is.  · If you use a stepladder:  ? Make sure that it is fully opened. Do not climb a closed stepladder.  ? Make sure that both sides of the stepladder are locked into place.  ? Ask someone to hold the stepladder for you while you use it.  · If there are any pets around you, be aware of where they are.  What can I  do in the bathroom?         · Keep the floor dry. Clean up any water that spills onto the floor as soon as it happens.  · Remove soap buildup in the tub or shower regularly.  · Use non-skid mats or decals on the floor of the tub or shower.  · Attach bath mats securely with double-sided, non-slip rug tape.  · If you need to sit down in the shower, use a plastic, non-slip stool.  · Install grab bars by the toilet and in the tub and shower. Do not use towel bars as grab bars.  What can I do in the bedroom?  · Make sure that you have a light by your bed that is easy to reach.  · Do not use any sheets or blankets that are too big for your bed. They should not hang down onto the floor.  · Have a firm chair that has side arms. You can use this for support while you get dressed.  What can I do in the kitchen?  · Clean up any spills right away.  · If you need to reach something above you, use a strong step stool that has a grab bar.  · Keep electrical cords out of the way.  · Do not use floor polish or wax that makes floors slippery. If you must use wax, use non-skid floor wax.  What can I do with my stairs?  · Do not leave any items on the stairs.  · Make sure that you have a light switch at the top of the stairs and the bottom of the stairs. If you do not have them, ask someone to add them for you.  · Make sure that there are handrails on both sides of the stairs, and use them. Fix handrails that are broken or loose. Make sure that handrails are as long as the stairways.  · Install non-slip stair treads on all stairs in your home.  · Avoid having throw rugs at the top or bottom of the stairs. If you do have throw rugs, attach them to the floor with carpet tape.  · Choose a carpet that does not hide the edge of the steps on the stairway.  · Check any carpeting to make sure that it is firmly attached to the stairs. Fix any carpet that is loose or worn.  What can I do on the outside of my home?  · Use bright outdoor  lighting.  · Regularly fix the edges of walkways and driveways and fix any cracks.  · Remove anything that might make you trip as you walk through a door, such as a raised step or threshold.  · Trim any bushes or trees on the path to your home.  · Regularly check to see if handrails are loose or broken. Make sure that both sides of any steps have handrails.  · Install guardrails along the edges of any raised decks and porches.  · Clear walking paths of anything that might make someone trip, such as tools or rocks.  · Have any leaves, snow, or ice cleared regularly.  · Use sand or salt on walking paths during winter.  · Clean up any spills in your garage right away. This includes grease or oil spills.  What other actions can I take?  · Wear shoes that:  ? Have a low heel. Do not wear high heels.  ? Have rubber bottoms.  ? Are comfortable and fit you well.  ? Are closed at the toe. Do not wear open-toe sandals.  · Use tools that help you move around (mobility aids) if they are needed. These include:  ? Canes.  ? Walkers.  ? Scooters.  ? Crutches.  · Review your medicines with your doctor. Some medicines can make you feel dizzy. This can increase your chance of falling.  Ask your doctor what other things you can do to help prevent falls.  Where to find more information  · Centers for Disease Control and Prevention, [place]: https://cdc.gov  · National [place] [employer] Aging: https://la5nzzh.tres.nih.gov  Contact a doctor if:  · You are afraid of falling at home.  · You feel weak, drowsy, or dizzy at home.  · You fall at home.  Summary  · There are many simple things that you can do to make your home safe and to help prevent falls.  · Ways to make your home safe include removing tripping hazards and installing grab bars in the bathroom.  · Ask for help when making these changes in your home.  This information is not intended to replace advice given to you by your health care provider. Make sure you discuss any questions you  have with your health care provider.  Document Revised: 04/09/2020 Document Reviewed: 08/02/2018  Elsevier Patient Education © 2020 [employer]

## 2020-12-07 NOTE — PROGRESS NOTES
The ABCs of the Annual Wellness Visit  Initial Medicare Wellness Visit    Chief Complaint   Patient presents with   • Follow-up     Hypothyroidism; Anxiety, HTN       Subjective   History of Present Illness:  Millie Richardson is a 75 y.o. female who presents for an Initial Medicare Wellness Visit.    Patient is here for follow-up of her hypertension, atrial fibrillation, diastolic congestive heart failure, hypothyroidism and GERD.  She continues to utilize anticoagulation.  She does need surveillance labs to monitor blood counts.  She continues to utilize thyroid supplementation.  Chronic impaired mobility and ADLs.  She denies any orthopnea of worsened nature.  She denies any fever, chills or night sweats.    HEALTH RISK ASSESSMENT    Recent Hospitalizations:  No hospitalization(s) within the last year.    Current Medical Providers:  Patient Care Team:  Rajan Luis DO as PCP - General (Family Medicine)  Martínez Cintron MD as Consulting Physician (Cardiology)  Sheeba Shepherd MD as Consulting Physician (Pulmonary Disease)  Rebekah Crowe MD as Consulting Physician (Obstetrics and Gynecology)  Dana Espinoza APRN as Nurse Practitioner (Family Medicine)    Smoking Status:  Social History     Tobacco Use   Smoking Status Never Smoker   Smokeless Tobacco Never Used       Alcohol Consumption:  Social History     Substance and Sexual Activity   Alcohol Use No       Depression Screen:   PHQ-2/PHQ-9 Depression Screening 11/30/2020   Little interest or pleasure in doing things 3   Feeling down, depressed, or hopeless 3   Trouble falling or staying asleep, or sleeping too much 3   Feeling tired or having little energy 3   Poor appetite or overeating 3   Feeling bad about yourself - or that you are a failure or have let yourself or your family down 1   Trouble concentrating on things, such as reading the newspaper or watching television 3   Moving or speaking so slowly that other people could have noticed. Or the  opposite - being so fidgety or restless that you have been moving around a lot more than usual 0   Thoughts that you would be better off dead, or of hurting yourself in some way 3   Total Score 22       Fall Risk Screen:  DENIZ Fall Risk Assessment was completed, and patient is at HIGH risk for falls. Assessment completed on:7/10/2020    Health Habits and Functional and Cognitive Screening:  No flowsheet data found.      Does the patient have evidence of cognitive impairment? No    Asprin use counseling:Does not need ASA (and currently is not on it)    Age-appropriate Screening Schedule:  Refer to the list below for future screening recommendations based on patient's age, sex and/or medical conditions. Orders for these recommended tests are listed in the plan section. The patient has been provided with a written plan.    Health Maintenance   Topic Date Due   • COLONOSCOPY  1945   • ZOSTER VACCINE (1 of 2) 10/10/1993   • DXA SCAN  03/29/2017   • MAMMOGRAM  09/14/2019   • INFLUENZA VACCINE  09/28/2021 (Originally 8/1/2020)   • TDAP/TD VACCINES (2 - Td) 08/06/2028          The following portions of the patient's history were reviewed and updated as appropriate: allergies, current medications, past family history, past medical history, past social history, past surgical history and problem list.    Outpatient Medications Prior to Visit   Medication Sig Dispense Refill   • amitriptyline (ELAVIL) 25 MG tablet 1-2 po q hs 60 tablet 3   • amLODIPine (NORVASC) 5 MG tablet Take 1 tablet by mouth Daily. 90 tablet 3   • buPROPion XL (Wellbutrin XL) 150 MG 24 hr tablet One po daily With 300mg 30 tablet 2   • buPROPion XL (Wellbutrin XL) 300 MG 24 hr tablet Take 1 tablet by mouth Daily. 30 tablet 5   • Eliquis 5 MG tablet tablet Take 1 tablet by mouth Every 12 (Twelve) Hours. 180 tablet 1   • lisinopril (PRINIVIL,ZESTRIL) 40 MG tablet Take 1 tablet by mouth Daily. 90 tablet 1   • metoprolol succinate XL (TOPROL-XL) 200 MG  24 hr tablet Take 1 tablet by mouth Daily. 90 tablet 1   • Misc. Devices (Bath/Shower Seat) misc 1 Units As Needed (bathing). 1 each 0   • Misc. Devices (Rollator Ultra-Light) misc 1 Units As Needed (ambulation assistance). 1 each 0   • NP Thyroid 30 MG tablet Take 1.5 tablets by mouth Daily. 135 tablet 1   • pantoprazole (PROTONIX) 40 MG EC tablet Take 1 tablet by mouth Daily. 90 tablet 1   • potassium chloride (K-DUR,KLOR-CON) 20 MEQ CR tablet Take 1 tablet by mouth Daily. 30 tablet 5   • venlafaxine (EFFEXOR) 100 MG tablet Take 1 tablet by mouth 2 (Two) Times a Day. 180 tablet 1     No facility-administered medications prior to visit.        Patient Active Problem List   Diagnosis   • Atrial fibrillation, persistent   • Essential hypertension   • History of myocardial infarction   • Acquired hypothyroidism   • Depression   • Blepharospasm   • Chronic diastolic (congestive) heart failure (CMS/HCC)   • Pulmonary hypertension (CMS/HCC)   • Gastroesophageal reflux disease without esophagitis   • Major depression, recurrent, chronic (CMS/HCC)   • Generalized anxiety disorder   • Chronic anticoagulation   • Age-related osteoporosis without current pathological fracture   • Moderate mitral regurgitation   • Moderate tricuspid regurgitation   • Cervical radiculopathy due to degenerative joint disease of spine   • Tricompartment osteoarthritis of both knees   • Abnormal x-ray of femur   • Spastic torticollis   • Vertigo       Advanced Care Planning:  ACP discussion was held with the patient during this visit. Patient does not have an advance directive, information provided.    Review of Systems  1. Constitutional: Negative for fever. Negative for chills, diaphoresis.  Chronic malaise/fatigue and without unexpected weight change.   2. HENT: No dysphagia; no changes to vision/hearing/smell/taste; no epistaxis.  Neck pain as per above.  3. Eyes: Negative for redness and visual disturbance.   4. Respiratory: negative for  "shortness of breath. Negative for chest pain . Negative for cough and chest tightness.   5. Cardiovascular: Negative for chest pain and palpitations.   6. Gastrointestinal: Negative for abdominal distention, abdominal pain and blood in stool.   7. Endocrine: Negative for cold intolerance and heat intolerance.   8. Genitourinary: Negative for difficulty urinating, dysuria and frequency.   9. Musculoskeletal: Chronic bilateral knee arthralgias, associated cervical back pain and myalgias as per above.   10. Skin: Negative for color change, rash and wound.   11. Neurological: Negative for syncope, weakness and headaches.   12. Hematological: Negative for adenopathy. Does not bruise/bleed easily.   13. Psychiatric/Behavioral: Negative for confusion. The patient is not nervous/anxious    Compared to one year ago, the patient feels her physical health is better.  Compared to one year ago, the patient feels her mental health is the same.    Reviewed chart for potential of high risk medication in the elderly: yes  Reviewed chart for potential of harmful drug interactions in the elderly:yes    Objective         Vitals:    12/07/20 1420   BP: 130/80   Pulse: 90   Temp: 96.8 °F (36 °C)   SpO2: 97%   Weight: 98.9 kg (218 lb)   Height: 172.7 cm (68\")       Body mass index is 33.15 kg/m².  Discussed the patient's BMI with her. The BMI is above average; BMI management plan is completed.    Physical Exam  General Appearance: alert, oriented x 3, no acute distress.  Pleasant and interactive during questioning and examination.  Skin: warm and dry.   HEENT: Atraumatic.  pupils round and reactive to light and accommodation, oral mucosa pink and moist.  Nares patent without epistaxis.  External auditory canals are patent tympanic membranes intact.  Poor dentition.  Neck: supple, no JVD, trachea midline.  No thyromegaly.  Spastic paravertebral musculature noted to the cervical spine, more problematic to the right side.  This does lead to " impaired sidebending and rotation.  Flexion/extension normal.  Lungs: CTA, unlabored breathing effort.  Heart: RRR, normal S1 and S2, no S3, no rub.  Abdomen: soft, non-tender, no palpable bladder, present bowel sounds to auscultation ×4.  No guarding or rigidity.  Extremities: no clubbing, cyanosis.  Good range of motion actively and passively.  Symmetric muscle strength and development.  Chronic gravity dependent edema noted to the lower extremities, does not extend above the medial tibias bilaterally.  Quadriceps mechanism intact, significant crepitance noted on A/P range of motion testing.  Medial/lateral joint line tenderness noted to bilateral knees, slightly more pronounced on the left.  Jessica/Estrada sign negative.  No leg length discrepancies.  Normal dorsiflexion/plantar flexion of bilateral feet.  Patient does have a slightly antalgic gait.  Utilizes walker for assistance with ambulation.  Neuro: normal speech and mental status.  Cranial nerves II through XII intact.  No anosmia. DTR 2+; proprioception intact.  No focal motor/sensory deficits.        Assessment/Plan   Medicare Risks and Personalized Health Plan  CMS Preventative Services Quick Reference  Advance Directive Discussion  Cardiovascular risk  Colon Cancer Screening  Fall Risk  Inactivity/Sedentary    The above risks/problems have been discussed with the patient.  Pertinent information has been shared with the patient in the After Visit Summary.  Follow up plans and orders are seen below in the Assessment/Plan Section.    Diagnoses and all orders for this visit:    1. Routine general medical examination at health care facility (Primary)  -     CBC & Differential  -     Comprehensive Metabolic Panel    2. Essential hypertension  -     CBC & Differential  -     TSH  -     T4, Free  -     Comprehensive Metabolic Panel    3. Chronic diastolic (congestive) heart failure (CMS/HCC)  -     CBC & Differential  -     Comprehensive Metabolic Panel    4.  Acquired hypothyroidism  -     TSH  -     T4, Free    5. Generalized anxiety disorder    6. Chronic anticoagulation  -     CBC & Differential  -     TSH  -     T4, Free  -     Comprehensive Metabolic Panel    7. Gastroesophageal reflux disease without esophagitis    8. Atrial fibrillation, persistent  -     CBC & Differential  -     TSH  -     T4, Free  -     Comprehensive Metabolic Panel      Follow Up:  No follow-ups on file.     An After Visit Summary and PPPS were given to the patient.     Vital signs demonstrate hemodynamic stability.  Surveillance labs today including CBC and CMP.    Continue current anticoagulation.  Surveillance CBC ordered.    Continue thyroid supplementation, thyroid function study labs today with dose adjustment if indicated.    Continue low-sodium dietary modifications, maintain adequate hydration.  Daily weights advised.  Demonstrates relative balance to her weight today clinically.

## 2020-12-08 ENCOUNTER — CONSULT (OUTPATIENT)
Dept: CARDIOLOGY | Facility: CLINIC | Age: 75
End: 2020-12-08

## 2020-12-08 VITALS
OXYGEN SATURATION: 96 % | HEIGHT: 68 IN | DIASTOLIC BLOOD PRESSURE: 82 MMHG | BODY MASS INDEX: 33.19 KG/M2 | HEART RATE: 64 BPM | WEIGHT: 219 LBS | SYSTOLIC BLOOD PRESSURE: 166 MMHG

## 2020-12-08 DIAGNOSIS — I48.21 PERMANENT ATRIAL FIBRILLATION (HCC): Primary | ICD-10-CM

## 2020-12-08 DIAGNOSIS — I27.20 PULMONARY HYPERTENSION (HCC): ICD-10-CM

## 2020-12-08 PROCEDURE — 99204 OFFICE O/P NEW MOD 45 MIN: CPT | Performed by: INTERNAL MEDICINE

## 2020-12-08 PROCEDURE — 93000 ELECTROCARDIOGRAM COMPLETE: CPT | Performed by: INTERNAL MEDICINE

## 2020-12-08 NOTE — PROGRESS NOTES
Millie Richardson  1945  PCP: Rajan Luis DO    SUBJECTIVE:   Millie Richardson is a 75 y.o. female seen for a consultation visit regarding the following:     Chief Complaint:   Chief Complaint   Patient presents with   • Persistent atrial fibrillation (CMS/HCC)   • Dizziness   • Leg Swelling          Consultation is requested by Rajan Luis DO for evaluation of Persistent atrial fibrillation (CMS/HCC), Dizziness, and Leg Swelling        History:  This is a 75-year-old patient referred by Rajan Luis for further evaluation management of atrial fibrillation.  Patient has a history of permanent atrial fibrillation that was seen by the EP service in the past.  She reports having ongoing issues with increased weakness and fatigue.  She states that she has no energy and is very depressed.  She stopped seeing  for her pulmonary hypertension.  She denies any episodes of significant palpitations.  Her biggest complaint is just the severe fatigue and depression.  Previous echoes have documented severe pulmonary hypertension.  Her metabolic work-up with her thyroid and CBC has been stable.  She has symptoms on a daily basis.      Cardiac PMH: (Old records have been reviewed and summarized below)  1.  Atrial fibrillation:  a. Diagnosed in 1999, initially treated with  Betapace, chronic Coumadin therapy.  b. Status post successful external cardioversion September 2003.    c. Echocardiogram, 08/27/2003:  Mild concentric LVH, LA 4.6, normal EF.  d. Holter monitor, 10/10/2003:  Heart rate 60 to 124 beats per minute, average  78 beats per minute with PACs, PVCs, and sinus rhythm.  e. Successful external cardioversion after Tikosyn initiation, 03/31/2004.  f. Recent persistent atrial fibrillation/documented per hospital visit at [facility] November 2015.  2. History of hypertension.  3. History of myocardial infarction, 1999, per patient, data deficient.  4.  Hypothyroidism/recent TSH abnormal hyperthyroid (patient  "noncompliant with reduced dose of [place] Thyroid).  5. Depression.  6. Blepharospasms.  7. History of rheumatic fever/rheumatic heart disease.  8. History of pneumonia.  9. Status post cholecystectomy  in 2001.  10. History of medical noncompliance.  11. Left knee injury, December 2015.    Past Medical History, Past Surgical History, Family history, Social History, and Medications were all reviewed with the patient today and updated as necessary.       Current Outpatient Medications:   •  amitriptyline (ELAVIL) 25 MG tablet, 1-2 po q hs, Disp: 60 tablet, Rfl: 3  •  amLODIPine (NORVASC) 5 MG tablet, Take 1 tablet by mouth Daily., Disp: 90 tablet, Rfl: 3  •  buPROPion XL (Wellbutrin XL) 150 MG 24 hr tablet, One po daily With 300mg, Disp: 30 tablet, Rfl: 2  •  buPROPion XL (Wellbutrin XL) 300 MG 24 hr tablet, Take 1 tablet by mouth Daily., Disp: 30 tablet, Rfl: 5  •  Eliquis 5 MG tablet tablet, Take 1 tablet by mouth Every 12 (Twelve) Hours., Disp: 180 tablet, Rfl: 1  •  lisinopril (PRINIVIL,ZESTRIL) 40 MG tablet, Take 1 tablet by mouth Daily., Disp: 90 tablet, Rfl: 1  •  metoprolol succinate XL (TOPROL-XL) 200 MG 24 hr tablet, Take 1 tablet by mouth Daily., Disp: 90 tablet, Rfl: 1  •  Misc. Devices (Bath/Shower Seat) misc, 1 Units As Needed (bathing)., Disp: 1 each, Rfl: 0  •  Misc. Devices (Rollator Ultra-Light) misc, 1 Units As Needed (ambulation assistance)., Disp: 1 each, Rfl: 0  •  NP Thyroid 30 MG tablet, Take 1.5 tablets by mouth Daily., Disp: 135 tablet, Rfl: 1  •  pantoprazole (PROTONIX) 40 MG EC tablet, Take 1 tablet by mouth Daily., Disp: 90 tablet, Rfl: 1  •  potassium chloride (K-DUR,KLOR-CON) 20 MEQ CR tablet, Take 1 tablet by mouth Daily., Disp: 30 tablet, Rfl: 5  •  venlafaxine (EFFEXOR) 100 MG tablet, Take 1 tablet by mouth 2 (Two) Times a Day., Disp: 180 tablet, Rfl: 1    Allergies   Allergen Reactions   • Penicillins Anaphylaxis   • Sulfa Antibiotics Other (See Comments)     Patient states \"it made " "me want to go to bed, I didn't have a fever but I felt like I did\"          Past Medical History:   Diagnosis Date   • Anemia    • Anxiety    • Arrhythmia    • Arthritis    • Atrial fibrillation (CMS/HCC)    • Blepharospasm    • Depression    • Gall stones    • GERD (gastroesophageal reflux disease)    • Heart attack (CMS/HCC)    • Heart murmur    • History of blood transfusion    • History of myocardial infarction 1999   • History of pneumonia    • History of rheumatic fever    • Hypertension    • Hypothyroidism     recent TSH abnormal hyperthyroid (patient noncompliant with reduced dose of [place] Thyroid).   • Irritable bowel syndrome (IBS)    • Migraines    • Mitral valve prolapse    • Myocardial infarction (CMS/HCC)    • Osteoporosis    • Ulcer      Past Surgical History:   Procedure Laterality Date   • CHOLECYSTECTOMY  2001     Family History   Problem Relation Age of Onset   • Colon cancer Father    • Hypertension Father    • Arthritis Father    • Heart attack Father    • Migraines Father    • Thyroid disease Mother    • Hypertension Mother    • Mental illness Mother    • Obesity Mother    • Cancer Brother    • Mental illness Brother      Social History     Tobacco Use   • Smoking status: Never Smoker   • Smokeless tobacco: Never Used   Substance Use Topics   • Alcohol use: No       ROS:  Review of Systems:  General: no recent weight loss/gain, + Extreme weakness and fatigue  Skin: no rashes, lumps, or other skin changes  HEENT: no dizziness, lightheadedness, or vision changes  Respiratory: no cough or hemoptysis  Cardiovascular: No palpitations, or tachycardia  Gastrointestinal: no black/tarry stools or diarrhea  Urinary: no change in frequency or urgency  Peripheral Vascular: no claudication or leg cramps  Musculoskeletal: no muscle or joint pain/stiffness  Psychiatric: + depression   Neurological: no sensory or motor loss, no syncope  Hematologic: no anemia, easy bruising or bleeding  Endocrine: no thyroid " "problems, nor heat or cold intolerance       PHYSICAL EXAM:   /82 (BP Location: Right arm, Patient Position: Sitting, Cuff Size: Adult)   Pulse 64   Ht 172.7 cm (68\")   Wt 99.3 kg (219 lb)   LMP  (LMP Unknown)   SpO2 96%   BMI 33.30 kg/m²      Wt Readings from Last 5 Encounters:   12/08/20 99.3 kg (219 lb)   12/07/20 98.9 kg (218 lb)   11/30/20 99.4 kg (219 lb 3.2 oz)   11/02/20 98.4 kg (217 lb)   10/30/20 98.9 kg (218 lb)     BP Readings from Last 5 Encounters:   12/08/20 166/82   12/07/20 130/80   11/30/20 142/82   11/02/20 138/80   10/30/20 126/72       General-Well Nourished, Well developed  Eyes - PERRLA  Neck- supple, No mass  CV- irregular rate and rhythm, 2/6 murmur  Lung- clear bilaterally  Abd- soft, +BS  Musc/skel - Norm strength and range of motion  Skin- warm and dry  Neuro - Alert & Oriented x 3, appropriate mood.    Medical problems and test results were reviewed with the patient today.     Results for orders placed or performed in visit on 09/28/20   TSH    Specimen: Blood   Result Value Ref Range    TSH 3.370 0.450 - 4.500 uIU/mL   T4, Free    Specimen: Blood   Result Value Ref Range    Free T4 0.88 0.82 - 1.77 ng/dL         No results found for: CHOL, HDL, HDLC, LDL, LDLC, VLDL    EKG:  (EKG/Tracing has been independently visualized by me and summarized below)      ECG 12 Lead    Date/Time: 12/8/2020 3:46 PM  Performed by: Guero Dasilva MD  Authorized by: Guero Dasilva MD   Comparison: compared with previous ECG   Similar to previous ECG  Rhythm: atrial fibrillation  Rate: bradycardic  BPM: 64  QRS axis: left    Clinical impression: abnormal EKG            ASSESSMENT and PLAN  1.  Atrial fibrillation-permanent in nature-we will check a monitor to determine if her rates are stable.  Unclear if the atrial fibrillation is contributing to her overall symptoms or the possible medication used to treat the atrial fibrillation.  She is on a very high dose of metoprolol therapy.  She " will continue Eliquis for stroke prophylaxis  2.  Severe pulmonary hypertension-she has been noncompliant with follow-up with the  clinic.  She reports having a disagreement with one of the physicians there.  3.  Extreme fatigue and weakness-her atrial fibrillation has been an ongoing chronic problem over the years.  We will check her average rates to see if her medications can be reduced.    Return in about 6 weeks (around 1/19/2021).    1. 2 week monitor        Guero Dasilva M.D., F.A.C.C, F.H.R.S.  Cardiology/Electrophysiology  12/08/20  15:48 EST

## 2020-12-12 PROCEDURE — G0179 MD RECERTIFICATION HHA PT: HCPCS | Performed by: FAMILY MEDICINE

## 2021-01-04 ENCOUNTER — TELEPHONE (OUTPATIENT)
Dept: CARDIOLOGY | Facility: CLINIC | Age: 76
End: 2021-01-04

## 2021-01-04 ENCOUNTER — OFFICE VISIT (OUTPATIENT)
Dept: BEHAVIORAL HEALTH | Facility: CLINIC | Age: 76
End: 2021-01-04

## 2021-01-04 ENCOUNTER — TELEPHONE (OUTPATIENT)
Dept: FAMILY MEDICINE CLINIC | Facility: CLINIC | Age: 76
End: 2021-01-04

## 2021-01-04 VITALS
SYSTOLIC BLOOD PRESSURE: 138 MMHG | OXYGEN SATURATION: 98 % | RESPIRATION RATE: 14 BRPM | DIASTOLIC BLOOD PRESSURE: 84 MMHG | BODY MASS INDEX: 33.3 KG/M2 | HEIGHT: 68 IN | TEMPERATURE: 97.1 F | HEART RATE: 93 BPM

## 2021-01-04 DIAGNOSIS — F51.04 PSYCHOPHYSIOLOGICAL INSOMNIA: ICD-10-CM

## 2021-01-04 DIAGNOSIS — F33.9 MAJOR DEPRESSION, RECURRENT, CHRONIC (HCC): Primary | ICD-10-CM

## 2021-01-04 PROCEDURE — 99213 OFFICE O/P EST LOW 20 MIN: CPT | Performed by: NURSE PRACTITIONER

## 2021-01-04 RX ORDER — BUPROPION HYDROCHLORIDE 150 MG/1
TABLET ORAL
Qty: 30 TABLET | Refills: 2 | Status: SHIPPED | OUTPATIENT
Start: 2021-01-04 | End: 2021-03-01 | Stop reason: SDUPTHER

## 2021-01-04 RX ORDER — BUPROPION HYDROCHLORIDE 300 MG/1
300 TABLET ORAL DAILY
Qty: 30 TABLET | Refills: 5 | Status: SHIPPED | OUTPATIENT
Start: 2021-01-04 | End: 2021-03-01 | Stop reason: SDUPTHER

## 2021-01-04 NOTE — TELEPHONE ENCOUNTER
EVARISTO FROM [facility] CALLED TO FOLLOW UP ON REQUEST THAT WAS MAILED ON 12/7 AND THEN FAXED ON 12/21.  THE REQUEST WAS FOR A PLAN OF CARE AND ORDER FOR PATIENT.  EVARISTO HAS NOT RECEIVED SIGNED PAPERWORK BACK AND STATES THAT IT IS URGENT TO GET THIS BACK TODAY.     PLEASE FAX TO:  [phone]

## 2021-01-04 NOTE — TELEPHONE ENCOUNTER
[employer] enrollment cancelled due to the company not being able to reach the patient. I called the patient and was able to reach her so I transferred her to the holter department.

## 2021-01-04 NOTE — PROGRESS NOTES
Patient Name: Millie Richardson  MRN: [id number]   :  1945     Chief Complaint:      ICD-10-CM ICD-9-CM   1. Major depression, recurrent, chronic (CMS/HCC)  F33.9 296.30   2. Psychophysiological insomnia  F51.04 307.42       History of Present Illness: Millie Richardson is a 75 y.o. female is here today for medication management follow up.  Patient states is still difficult to fall asleep however once she is asleep she sleeps very well.  States her home health nurse has stated they noticed an improvement in her mood.  Patient states she notices slight improvement.    The following portions of the patient's history were reviewed and updated as appropriate: allergies, current medications, past family history, past medical history, past social history, past surgical history and problem list.    Review of Systems;;  Review of Systems   Constitutional: Negative for activity change, appetite change, fatigue, unexpected weight gain and unexpected weight loss.   Respiratory: Negative for shortness of breath and wheezing.    Gastrointestinal: Negative for constipation, diarrhea, nausea and vomiting.   Musculoskeletal: Negative for gait problem.   Skin: Negative for dry skin and rash.   Neurological: Negative for dizziness, speech difficulty, weakness, light-headedness, headache, memory problem and confusion.   Psychiatric/Behavioral: Positive for dysphoric mood and sleep disturbance. Negative for agitation, behavioral problems, decreased concentration, hallucinations, self-injury, suicidal ideas, negative for hyperactivity, depressed mood and stress. The patient is not nervous/anxious.        Physical Exam;;  Physical Exam  Vitals signs and nursing note reviewed.   Constitutional:       General: She is not in acute distress.     Appearance: She is well-developed. She is not diaphoretic.   HENT:      Head: Normocephalic and atraumatic.   Eyes:      Conjunctiva/sclera: Conjunctivae normal.   Neck:      Musculoskeletal: Full passive  "range of motion without pain and normal range of motion.   Cardiovascular:      Rate and Rhythm: Normal rate.   Pulmonary:      Effort: Pulmonary effort is normal. No respiratory distress.   Musculoskeletal: Normal range of motion.   Skin:     General: Skin is warm and dry.   Neurological:      Mental Status: She is alert and oriented to person, place, and time.   Psychiatric:         Mood and Affect: Mood is depressed. Mood is not anxious. Affect is not labile, blunt, angry or inappropriate.         Speech: Speech is not rapid and pressured or tangential.         Behavior: Behavior normal. Behavior is not agitated, slowed, aggressive, withdrawn, hyperactive or combative. Behavior is cooperative.         Thought Content: Thought content normal. Thought content is not paranoid or delusional. Thought content does not include homicidal or suicidal ideation. Thought content does not include homicidal or suicidal plan.         Judgment: Judgment normal.       Blood pressure 138/84, pulse 93, temperature 97.1 °F (36.2 °C), resp. rate 14, height 172.7 cm (68\"), SpO2 98 %, not currently breastfeeding.  Body mass index is 33.3 kg/m².    Current Medications;;    Current Outpatient Medications:   •  amitriptyline (ELAVIL) 25 MG tablet, 1-2 po q hs, Disp: 60 tablet, Rfl: 3  •  amLODIPine (NORVASC) 5 MG tablet, Take 1 tablet by mouth Daily., Disp: 90 tablet, Rfl: 3  •  buPROPion XL (Wellbutrin XL) 150 MG 24 hr tablet, One po daily With 300mg, Disp: 30 tablet, Rfl: 2  •  buPROPion XL (Wellbutrin XL) 300 MG 24 hr tablet, Take 1 tablet by mouth Daily., Disp: 30 tablet, Rfl: 5  •  Eliquis 5 MG tablet tablet, Take 1 tablet by mouth Every 12 (Twelve) Hours., Disp: 180 tablet, Rfl: 1  •  lisinopril (PRINIVIL,ZESTRIL) 40 MG tablet, Take 1 tablet by mouth Daily., Disp: 90 tablet, Rfl: 1  •  metoprolol succinate XL (TOPROL-XL) 200 MG 24 hr tablet, Take 1 tablet by mouth Daily., Disp: 90 tablet, Rfl: 1  •  Misc. Devices (Bath/Shower Seat) " misc, 1 Units As Needed (bathing)., Disp: 1 each, Rfl: 0  •  Misc. Devices (Rollator Ultra-Light) misc, 1 Units As Needed (ambulation assistance)., Disp: 1 each, Rfl: 0  •  NP Thyroid 30 MG tablet, Take 1.5 tablets by mouth Daily., Disp: 135 tablet, Rfl: 1  •  pantoprazole (PROTONIX) 40 MG EC tablet, Take 1 tablet by mouth Daily., Disp: 90 tablet, Rfl: 1  •  potassium chloride (K-DUR,KLOR-CON) 20 MEQ CR tablet, Take 1 tablet by mouth Daily., Disp: 30 tablet, Rfl: 5  •  venlafaxine (EFFEXOR) 100 MG tablet, Take 1 tablet by mouth 2 (Two) Times a Day., Disp: 180 tablet, Rfl: 1    Lab Results:   No visits with results within 3 Month(s) from this visit.   Latest known visit with results is:   Office Visit on 09/28/2020   Component Date Value Ref Range Status   • TSH 09/28/2020 3.370  0.450 - 4.500 uIU/mL Final   • Free T4 09/28/2020 0.88  0.82 - 1.77 ng/dL Final       Mental Status Exam:   Hygiene:   fair  Cooperation:  Cooperative  Eye Contact:  Good  Psychomotor Behavior:  Appropriate  Mood:dysthymic  Affect:  Full range  Hopelessness: Denies  Speech:  Normal  Thought Process:  Goal directed  Thought Content:  Normal  Suicidal:  None  Homicidal:  None  Hallucinations:  None  Delusion:  None  Memory:  Intact  Orientation:  Person, Place, Time and Situation  Reliability:  good  Insight:  Good  Judgement:  Good  Impulse Control:  Good  Physical/Medical Issues:  No     PHQ-9 Depression Screening  Little interest or pleasure in doing things?     Feeling down, depressed, or hopeless?     Trouble falling or staying asleep, or sleeping too much?     Feeling tired or having little energy?     Poor appetite or overeating?     Feeling bad about yourself - or that you are a failure or have let yourself or your family down?     Trouble concentrating on things, such as reading the newspaper or watching television?     Moving or speaking so slowly that other people could have noticed? Or the opposite - being so fidgety or restless  that you have been moving around a lot more than usual?     Thoughts that you would be better off dead, or of hurting yourself in some way?     PHQ-9 Total Score     If you checked off any problems, how difficult have these problems made it for you to do your work, take care of things at home, or get along with other people?          Assessment/Plan:  Diagnoses and all orders for this visit:    1. Major depression, recurrent, chronic (CMS/HCC) (Primary)  -     buPROPion XL (Wellbutrin XL) 150 MG 24 hr tablet; One po daily With 300mg  Dispense: 30 tablet; Refill: 2  -     buPROPion XL (Wellbutrin XL) 300 MG 24 hr tablet; Take 1 tablet by mouth Daily.  Dispense: 30 tablet; Refill: 5    2. Psychophysiological insomnia      Continue medications as ordered.  Have caregiver check to make sure patient is getting to amitriptyline at bedtime.    A psychological evaluation was conducted in order to assess past and current level of functioning. Areas assessed included, but were not limited to: perception of social support, perception of ability to face and deal with challenges in life (positive functioning), anxiety symptoms, depressive symptoms, perspective on beliefs/belief system, coping skills for stress, intelligence level,  Therapeutic rapport was established. Interventions conducted today were geared towards incorporating medication management along with support for continued therapy. Education was also provided as to the med management with this provider and what to expect in subsequent sessions.    We discussed risks, benefits,goals and side effects of the above medication and the patient was agreeable with the plan.Patient was educated on the importance of compliance with treatment and follow-up appointments. Patient is aware to contact the [facility] with any worsening of symptoms. To call for questions or concerns and return early if necessary. Patent is agreeable to go to the Emergency Department or call 911  should they begin SI/HI.     Treatment Plan:   Discussed risks, benefits, and alternatives of medication. Encouraged healthy habits (eating, exercise and sleep). Call if any questions or problems arise. Medication reconciled. Controlled substance monitoring report reviewed. Provided psychoeducation.. Discussed coping strategies and current stressors. Set appropriate boundaries and limits for patient's well-being. Use distraction techniques to improve symptoms. Access support networks.      Return in about 2 months (around 3/4/2021) for Follow Up 15 min.    Gretchen Jacobson, MARIA L

## 2021-01-07 ENCOUNTER — TELEPHONE (OUTPATIENT)
Dept: FAMILY MEDICINE CLINIC | Facility: CLINIC | Age: 76
End: 2021-01-07

## 2021-01-08 ENCOUNTER — OFFICE VISIT (OUTPATIENT)
Dept: FAMILY MEDICINE CLINIC | Facility: CLINIC | Age: 76
End: 2021-01-08

## 2021-01-08 VITALS
OXYGEN SATURATION: 98 % | HEIGHT: 68 IN | DIASTOLIC BLOOD PRESSURE: 90 MMHG | HEART RATE: 98 BPM | WEIGHT: 219 LBS | TEMPERATURE: 98.6 F | BODY MASS INDEX: 33.19 KG/M2 | SYSTOLIC BLOOD PRESSURE: 144 MMHG

## 2021-01-08 DIAGNOSIS — G90.9 AUTONOMIC INSTABILITY: ICD-10-CM
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
## 2021-01-08 DIAGNOSIS — I48.21 PERMANENT ATRIAL FIBRILLATION (HCC): ICD-10-CM

## 2021-01-08 DIAGNOSIS — Z79.01 CHRONIC ANTICOAGULATION: ICD-10-CM

## 2021-01-08 DIAGNOSIS — R42 ORTHOSTATIC DIZZINESS: ICD-10-CM

## 2021-01-08 PROCEDURE — 99214 OFFICE O/P EST MOD 30 MIN: CPT | Performed by: FAMILY MEDICINE

## 2021-01-08 NOTE — PROGRESS NOTES
"    Established Patient        Chief Complaint:   Chief Complaint   Patient presents with   • Follow-up HTN/Hypothyroidism/JANET/Depression/A-Fib             Millie Richardson is a 75 y.o. female    History of Present Illness:   Here today accompanied by personal caregiver in follow-up of her hypertension, hypothyroidism, depression with anxiety/A. fib, as well as some orthostatic vertigo.  She has a recent history of 2 falls occurring 2 days ago, they were predominantly with weightbearing status and changing of position.  She denies any change in hydration, no associated seizure-like activity.  She does remember the falls, however she does admit to pain in the back of her head on the floor with a hematoma development.  She was seen by home health nurse with neurological stability and additionally stable vital signs at this time.  Patient denies any loss of consciousness.  When questioned further she describes the sensation of being pulled down aggressively when these happen.  She is ambiguous in her description of the episodes, as well as the frequency, at times stating that they are increasing in frequency, and upon questioning further will deny these changes.    She denies any fever, chills or night sweats.  No recent changes to her medication regimen.  She denies any hematuria or dysuria.  Denies any bright red blood or black tarry stools.  Subjective     The following portions of the patient's history were reviewed and updated as appropriate: allergies, current medications, past family history, past medical history, past social history, past surgical history and problem list.    Allergies   Allergen Reactions   • Penicillins Anaphylaxis   • Sulfa Antibiotics Other (See Comments)     Patient states \"it made me want to go to bed, I didn't have a fever but I felt like I did\"        Review of Systems  1. Constitutional: Negative for fever. Negative for chills, diaphoresis.  Chronic malaise/fatigue and without unexpected " "weight change.   2. HENT: No dysphagia; no changes to vision/hearing/smell/taste; no epistaxis.  Neck pain as per above.  3. Eyes: Negative for redness and visual disturbance.   4. Respiratory: negative for shortness of breath. Negative for chest pain . Negative for cough and chest tightness.   5. Cardiovascular: Negative for chest pain and palpitations.   6. Gastrointestinal: Negative for abdominal distention, abdominal pain and blood in stool.   7. Endocrine: Negative for cold intolerance and heat intolerance.   8. Genitourinary: Negative for difficulty urinating, dysuria and frequency.   9. Musculoskeletal: Chronic bilateral knee arthralgias, associated cervical back pain and myalgias as per above.   10. Skin: Negative for color change, rash and wound.   11. Neurological: Negative for syncope, weakness and headaches.   12. Hematological: Negative for adenopathy. Does not bruise/bleed easily.   13. Psychiatric/Behavioral: Negative for confusion. The patient is not nervous/anxious.      Objective     Physical Exam   Vital Signs: /90   Pulse 98   Temp 98.6 °F (37 °C)   Ht 172.7 cm (68\")   Wt 99.3 kg (219 lb)   LMP  (LMP Unknown)   SpO2 98%   Breastfeeding No   BMI 33.30 kg/m²     General Appearance: alert, oriented x 3, no acute distress.  Pleasant and interactive during questioning and examination.  Skin: warm and dry.   HEENT: Occipital hematoma noted, no laceration or open areas of skin.  pupils round and reactive to light and accommodation, oral mucosa pink and moist.  Nares patent without epistaxis.  External auditory canals are patent tympanic membranes intact.  Poor dentition.  Neck: supple, no JVD, trachea midline.  No thyromegaly.  Spastic paravertebral musculature noted to the cervical spine, more problematic to the right side.  This does lead to impaired sidebending and rotation.  Flexion/extension normal.  Lungs: CTA, unlabored breathing effort.  Heart: RRR, normal S1 and S2, no S3, no " rub.  Abdomen: soft, non-tender, no palpable bladder, present bowel sounds to auscultation ×4.  No guarding or rigidity.  Extremities: no clubbing, cyanosis.  Good range of motion actively and passively.  Symmetric muscle strength and development.  Chronic gravity dependent edema noted to the lower extremities, does not extend above the medial tibias bilaterally.  Quadriceps mechanism intact, significant crepitance noted on A/P range of motion testing.  Medial/lateral joint line tenderness noted to bilateral knees, slightly more pronounced on the left.  Normal dorsiflexion/plantar flexion of bilateral feet.  Patient does have a slightly antalgic gait.  Utilizes walker for assistance with ambulation.  Neuro: normal speech and mental status.  Cranial nerves II through XII intact.  No anosmia. DTR 2+; proprioception intact.  No focal motor/sensory deficits.      Assessment and Plan      Assessment:   Diagnoses and all orders for this visit:    1. Injury due to fall, initial encounter (Primary)    2. Orthostatic dizziness  -     CBC & Differential  -     Basic Metabolic Panel    3. Permanent atrial fibrillation (CMS/HCC)  -     CBC & Differential  -     Basic Metabolic Panel    4. Chronic anticoagulation  -     CBC & Differential  -     Basic Metabolic Panel    5. Autonomic instability        Plan:  Consider fludrocortisone or midodrine if labs are normal.  I do suspect patient is suffering from orthostatic hypotension, versus autonomic instability.    Pt to have labs done at [facility] today.  CBC/BMP.    Heart rate at goal and well controlled.      Discussion Summary:  I spent 30 minutes caring for Millie on this date of service. This time includes time spent by me in the following activities:preparing for the visit, performing a medically appropriate examination and/or evaluation , counseling and educating the patient/family/caregiver, ordering medications, tests, or procedures, documenting information in the medical  record and care coordination    Discussed plan of care in detail with pt today; pt verb understanding and agrees.    I have reviewed and updated all copied forward information, as appropriate.  I attest to the accuracy and relevance of any unchanged information.    Follow up:  No follow-ups on file.     There are no Patient Instructions on file for this visit.    Rajan Luis DO  01/08/21  12:41 EST          Please note that portions of this note may have been completed with a voice recognition program. Efforts were made to edit the dictations, but occasionally words are mistranscribed.

## 2021-01-14 ENCOUNTER — TELEPHONE (OUTPATIENT)
Dept: FAMILY MEDICINE CLINIC | Facility: CLINIC | Age: 76
End: 2021-01-14

## 2021-01-14 NOTE — TELEPHONE ENCOUNTER
Attempted to contact shadi. There was no answer, left message to clarify that she is wanting to go out and see Ms. Richardson again next week.

## 2021-01-14 NOTE — TELEPHONE ENCOUNTER
MAYUR FROM [facility] CALLED REQUESTING TO SEE THE NEXT WEEK BECAUSE PT HAD A FALL LAST WEEK    CALL BACK NUMBER: [phone]

## 2021-01-14 NOTE — TELEPHONE ENCOUNTER
I spoke with Marycarmen and she would like to do a PRN visit with Ms. Dylan.    I gave a verbal ok for this, but wanted to give you a heads up on this as well.

## 2021-01-25 ENCOUNTER — OFFICE VISIT (OUTPATIENT)
Dept: PSYCHIATRY | Facility: CLINIC | Age: 76
End: 2021-01-25

## 2021-01-25 DIAGNOSIS — F33.1 MODERATE EPISODE OF RECURRENT MAJOR DEPRESSIVE DISORDER (HCC): Primary | ICD-10-CM

## 2021-01-25 PROCEDURE — 90791 PSYCH DIAGNOSTIC EVALUATION: CPT | Performed by: SOCIAL WORKER

## 2021-01-25 NOTE — PROGRESS NOTES
"Patient ID: Millie Richardson is a 75 y.o. female presenting to [facility] for assessment with Shayla Galloway LCSW.     Time: 2:30 pm   Time out: 3:30 pm     Description of current emotional/behavioral concerns: Patient states that she has been depressed since high school . She state that several things have bothered her recently such as : her brother's death four years ago, she says her doctor that she loved was replaced, and she \"hates my new doctor's guts!\" She says her new doctor did not communicate with her. Patient graduated from [employer], and obtained a degree in counseling. She states that she also becomes very angry thinking about a disagreement she had with a [employer] she attended. She states that this Muslim did not believe that Jewel had a literal resurrection, which angered her. Patient states that she is very angry and feels most people are incompetent. She says that like Freud believed, all of her problems are because of her mother. Patient states she refuses to forgive her mother, who is now .     Patient adamantly and convincingly denies current suicidal or homicidal ideation or perceptual disturbance.    Significant Life Events  Has patient been through or witnessed a divorce? no      Has patient experienced a death / loss of relationship? no      Has patient experienced a major accident or tragic events? no      Has patient experienced any other significant life events or trauma (such as verbal, physical, sexual abuse)? no      Work History  Highest level of education obtained: college    Ever been active duty in the ? no    Patient's Occupation: Retired counselor    Legal History  The patient has no significant history of legal issues.    Interpersonal/Relational  Marital Status: not   Patient's current living situation:   Support system: friends  Difficulty getting along with peers: yes  Difficulty making new friendships: " yes  Difficulty maintaining friendships: yes  Close with family members: no    Mental/Behavioral Health History  History of prior treatment or hospitalization: no    Are there any significant health issues (current or past): Yes arrhythmia    History of seizures: no    Family History   Problem Relation Age of Onset   • Colon cancer Father    • Hypertension Father    • Arthritis Father    • Heart attack Father    • Migraines Father    • Thyroid disease Mother    • Hypertension Mother    • Mental illness Mother    • Obesity Mother    • Cancer Brother    • Mental illness Brother        Current Medications:   Current Outpatient Medications   Medication Sig Dispense Refill   • amitriptyline (ELAVIL) 25 MG tablet 1-2 po q hs 60 tablet 3   • amLODIPine (NORVASC) 5 MG tablet Take 1 tablet by mouth Daily. 90 tablet 3   • buPROPion XL (Wellbutrin XL) 150 MG 24 hr tablet One po daily With 300mg 30 tablet 2   • buPROPion XL (Wellbutrin XL) 300 MG 24 hr tablet Take 1 tablet by mouth Daily. 30 tablet 5   • Eliquis 5 MG tablet tablet Take 1 tablet by mouth Every 12 (Twelve) Hours. 180 tablet 1   • lisinopril (PRINIVIL,ZESTRIL) 40 MG tablet Take 1 tablet by mouth Daily. 90 tablet 1   • metoprolol succinate XL (TOPROL-XL) 200 MG 24 hr tablet Take 1 tablet by mouth Daily. 90 tablet 1   • Misc. Devices (Bath/Shower Seat) misc 1 Units As Needed (bathing). 1 each 0   • Misc. Devices (Rollator Ultra-Light) misc 1 Units As Needed (ambulation assistance). 1 each 0   • NP Thyroid 30 MG tablet Take 1.5 tablets by mouth Daily. 135 tablet 1   • pantoprazole (PROTONIX) 40 MG EC tablet Take 1 tablet by mouth Daily. 90 tablet 1   • potassium chloride (K-DUR,KLOR-CON) 20 MEQ CR tablet Take 1 tablet by mouth Daily. 30 tablet 5   • venlafaxine (EFFEXOR) 100 MG tablet Take 1 tablet by mouth 2 (Two) Times a Day. 180 tablet 1     No current facility-administered medications for this visit.        History of Substance Use:   Patient answered no  to  experiencing two or more of the following problems related to substance use: using more than intended or over longer period than intended; difficulty quitting or cutting back use; spending a great deal of time obtaining, using, or recovering from using; craving or strong desire or urge to use;  work and/or school problems; financial problems; family problems; using in dangerous situations; physical or mental health problems; relapse; feelings of guilt or remorse about use; times when used and/or drank alone; needing to use more in order to achieve the desired effect; illness or withdrawal when stopping or cutting back use; using to relieve or avoid getting ill or developing withdrawal symptoms; and black outs and/or memory issues when using.        SUICIDE RISK ASSESSMENT/CSSRS  1. Does patient have thoughts of suicide? no  2. Does patient have intent for suicide? no  3. Does patient have a current plan for suicide? no  4. History of suicide attempts: no  5. Family history of suicide or attempts: no  6. History of violent behaviors towards others or property or thoughts of committing suicide: no  7. History of sexual aggression toward others: no  8. Access to firearms or weapons: no    Mental Status Exam:   Hygiene:   good  Cooperation:  Suspicious  Eye Contact:  Good  Psychomotor Behavior:  Appropriate  Affect:  Angry  Mood: irritable  Hopelessness: Denies  Speech:  Normal  Thought Process:  Goal directed  Thought Content: Bizarre  Suicidal:  None  Homicidal:  None  Hallucinations:  None  Delusion:  None  Memory:  Intact  Orientation:  Person, Place, Time and Situation  Reliability:  poor  Insight:  Poor  Judgement:  Poor  Impulse Control:  Good    Impression/Formulation:    VISIT DIAGNOSIS:     ICD-10-CM ICD-9-CM   1. Moderate episode of recurrent major depressive disorder (CMS/[facility])  F33.1 296.32        Patient appeared alert and oriented.  Patient is voluntarily requesting to begin outpatient therapy at [facility].  Patient is receptive to assistance with maintaining a stable lifestyle.  Patient presents with history of depression.  Patient is agreeable to attend routine therapy sessions.  Patient expressed desire to maintain stability and participate in the therapeutic process.        Crisis Plan:  Symptoms and/or behaviors to indicate a crisis: Prolonged irritability or anger    What calming techniques or other strategies will patient use to de-esclate and stay safe: slow down, breathe, visualize calming self, think it though, listen to music, change focus, take a walk    Who is one person patient can contact to assist with de-escalation? Her friend    If symptoms/behaviors persist, patient will present to the nearest hospital for an assessment. Advised patient of [facility] 24/7 assessment services.       Plan:   Obtain release of information for current treatment team for continuity of care  Patient will adhere to medication regimen as prescribed and report any side effects. Patient will contact this office, call 911 or present to the nearest emergency room should suicidal or homicidal ideations occur.  Begin psychotherapy         This document has been electronically signed by HOWIE Pena, PARISH  January 26, 2021 09:34 EST    Part of this note may be an electronic transcription/translation of spoken language to printed text using the Dragon Dictation System.

## 2021-01-26 ENCOUNTER — TELEPHONE (OUTPATIENT)
Dept: FAMILY MEDICINE CLINIC | Facility: CLINIC | Age: 76
End: 2021-01-26

## 2021-01-26 DIAGNOSIS — Z79.01 CHRONIC ANTICOAGULATION: ICD-10-CM

## 2021-01-26 RX ORDER — APIXABAN 5 MG/1
5 TABLET, FILM COATED ORAL EVERY 12 HOURS SCHEDULED
Qty: 180 TABLET | Refills: 1 | Status: SHIPPED | OUTPATIENT
Start: 2021-01-26 | End: 2021-08-09

## 2021-02-02 ENCOUNTER — TELEPHONE (OUTPATIENT)
Dept: FAMILY MEDICINE CLINIC | Facility: CLINIC | Age: 76
End: 2021-02-02

## 2021-02-02 NOTE — TELEPHONE ENCOUNTER
It is fine to be used, however I would prefer it not be used more frequently than 3 times daily at most.  It is to be taken with food additionally.  The lowest therapeutically beneficial dose would be of preference as well, try to stick to 200 mg dosing with each dose.

## 2021-02-02 NOTE — TELEPHONE ENCOUNTER
patients medication list does not refect Ibuprofen. Please advise if it is ok for patient to take this with current medications    Patient is requesting an order for dentures so [facility] can get a waiver to have these paid for

## 2021-02-02 NOTE — TELEPHONE ENCOUNTER
MAYUR FROM [facility] CALLED AND STATED PATIENT NEEDS DENTURES, SO THEY NEED AN ORDER SO THEY CAN GET HER A  WAIVER.    PATIENT IS TAKING IBUPROFEN AS NEEDED.  IT IS SHOWING A POSSIBLE INTERACTION WITH SOME OF HER OTHER MEDICATIONS (4-5 MEDICATIONS).  SHE NEEDS TO KNOW IF IT'S OKAY FOR HER TO CONTINUE.    MAYUR DID DO PATIENT'S RE-CERT AND WILL BE SEEING PATIENT EVERY 2 WEEKS WITH 2 PRN VISITS.    CALLBACK:  [phone]

## 2021-02-08 ENCOUNTER — TELEPHONE (OUTPATIENT)
Dept: FAMILY MEDICINE CLINIC | Facility: CLINIC | Age: 76
End: 2021-02-08

## 2021-02-08 NOTE — TELEPHONE ENCOUNTER
Caller: Millie Richardson    Relationship: Self    Best call back number: [phone]    What test was performed: BLOOD WORK     When was the test performed: 3 WEEKS AGO     Where was the test performed: [facility]     Additional notes: PATIENT IS REQUESTING LAB RESULTS AND        PATIENT WOULD LIKE TO HAVE THE ANOTHER LAB ORDER FAXED TO [facility] IN [place]     PATIENT CALL BACK [phone]

## 2021-02-09 ENCOUNTER — OUTSIDE FACILITY SERVICE (OUTPATIENT)
Dept: FAMILY MEDICINE CLINIC | Facility: CLINIC | Age: 76
End: 2021-02-09

## 2021-02-09 NOTE — TELEPHONE ENCOUNTER
Caller: Millie Richardson    Relationship: Self    Best call back number: [phone]     What orders are you requesting (i.e. lab or imaging): LAB AT [facility]    Additional notes: THE HOSPITAL DOESN'T HAVE THE ORDERS YET.  HER AIDE LOLIS MCKENNA WILL  A COPY.  PLEASE MAKE A COPY FOR HER.

## 2021-02-17 NOTE — TELEPHONE ENCOUNTER
----- Message from Kristine Chin sent at 8/12/2019  3:39 PM EDT -----  Contact: PT  PT RESCHEDULED HER ANNUAL WITH DR CARCAMO FOR 10/8/19.  PLEASE SEND REFILLS OF PREMPRO TO [facility].  THANKS  
Pt informed and 2 refills given.   
 ID#: [id number]. 27 year old female with PMH of Abdominoplasty presents to the ED complaining of Left flank pain for a few days. Today, pain radiated to the LLQ, 10/10 severity and she has a syncopal episode with LOC less than a minute and no associated head injury. Pt reports associated nausea and blood in the urine, but denies vomiting, diarrhea, constipation, melena, hematochezia, dysuria, urinary frequency and urgency, fevers and chills. Pt denies any other complaints.

## 2021-02-25 ENCOUNTER — TELEPHONE (OUTPATIENT)
Dept: FAMILY MEDICINE CLINIC | Facility: CLINIC | Age: 76
End: 2021-02-25

## 2021-02-25 NOTE — TELEPHONE ENCOUNTER
MAYUR WITH [employer] CALLED AND WANTED TO MAKE DR AWARE THAT THE PT HAS BEEN TAKING HALF 10MG OF THE RX  potassium chloride (K-DUR,KLOR-CON) 20 MEQ CR tablet. AND EVERY TIME POT GET A REFILL FOR THE RX ITS 20 MG , IT HAS NOT BEEN UPDATED.    PLEASE ADVISE.  CALL BACK:[phone]

## 2021-02-26 DIAGNOSIS — I10 ESSENTIAL HYPERTENSION: ICD-10-CM

## 2021-02-26 RX ORDER — POTASSIUM CHLORIDE 750 MG/1
10 TABLET, EXTENDED RELEASE ORAL DAILY
Qty: 30 TABLET | Refills: 3 | Status: SHIPPED | OUTPATIENT
Start: 2021-02-26 | End: 2021-05-28

## 2021-03-01 ENCOUNTER — OFFICE VISIT (OUTPATIENT)
Dept: BEHAVIORAL HEALTH | Facility: CLINIC | Age: 76
End: 2021-03-01

## 2021-03-01 DIAGNOSIS — F41.1 GENERALIZED ANXIETY DISORDER: ICD-10-CM

## 2021-03-01 DIAGNOSIS — F33.9 MAJOR DEPRESSION, RECURRENT, CHRONIC (HCC): Primary | ICD-10-CM

## 2021-03-01 DIAGNOSIS — F51.04 PSYCHOPHYSIOLOGICAL INSOMNIA: ICD-10-CM

## 2021-03-01 PROCEDURE — 99442 PR PHYS/QHP TELEPHONE EVALUATION 11-20 MIN: CPT | Performed by: NURSE PRACTITIONER

## 2021-03-01 RX ORDER — AMITRIPTYLINE HYDROCHLORIDE 25 MG/1
TABLET, FILM COATED ORAL
Qty: 60 TABLET | Refills: 3 | Status: SHIPPED | OUTPATIENT
Start: 2021-03-01 | End: 2022-05-04 | Stop reason: SDUPTHER

## 2021-03-01 RX ORDER — BUPROPION HYDROCHLORIDE 300 MG/1
300 TABLET ORAL DAILY
Qty: 30 TABLET | Refills: 5 | Status: SHIPPED | OUTPATIENT
Start: 2021-03-01 | End: 2022-01-18

## 2021-03-01 RX ORDER — VENLAFAXINE 100 MG/1
100 TABLET ORAL 2 TIMES DAILY
Qty: 180 TABLET | Refills: 1 | Status: SHIPPED | OUTPATIENT
Start: 2021-03-01 | End: 2021-03-24 | Stop reason: SDUPTHER

## 2021-03-01 RX ORDER — BUPROPION HYDROCHLORIDE 150 MG/1
TABLET ORAL
Qty: 30 TABLET | Refills: 2 | Status: SHIPPED | OUTPATIENT
Start: 2021-03-01 | End: 2021-05-14 | Stop reason: SDUPTHER

## 2021-03-01 NOTE — PROGRESS NOTES
You have chosen to receive care through a telephone visit. Do you consent to use a telephone visit for your medical care today? Yes    Individuals involved:     Millie - Patient   FERNANDO Quispe APRN

## 2021-03-09 ENCOUNTER — TELEPHONE (OUTPATIENT)
Dept: FAMILY MEDICINE CLINIC | Facility: CLINIC | Age: 76
End: 2021-03-09

## 2021-03-09 NOTE — TELEPHONE ENCOUNTER
[facility] WAS WANTING TO STATE SOME PATIENT CONCERNS. THE PATIENT FEELS THAT SHE HAS A DECREASE IN STAMINA AND FOCUS. SHE FEELS THAT IT IS GETTING WORSE. THE PATIENT HAS NOT HAD A SHOWER/BATH FOR THE PAST 5 WEEKS. A SKIN ASSESSMENT WAS CONDUCTED; SHE HAS SOME REDNESS AND SWELLING ON HER RIGHT TOE. MAYUR WAS WONDERING IF THE PATIENT SHOULD COME IN FOR A SCHEDULED ASSESSMENT. PLEASE ADVISE.     CONTACT: [phone]

## 2021-03-24 ENCOUNTER — TELEPHONE (OUTPATIENT)
Dept: FAMILY MEDICINE CLINIC | Facility: CLINIC | Age: 76
End: 2021-03-24

## 2021-03-24 DIAGNOSIS — F41.1 GENERALIZED ANXIETY DISORDER: ICD-10-CM

## 2021-03-24 DIAGNOSIS — E03.9 ACQUIRED HYPOTHYROIDISM: ICD-10-CM

## 2021-03-24 DIAGNOSIS — F33.9 MAJOR DEPRESSION, RECURRENT, CHRONIC (HCC): ICD-10-CM

## 2021-03-24 NOTE — TELEPHONE ENCOUNTER
Caller: MAYUR STREETER    Relationship: HOME HEALTH NURSE    Best call back number: [phone]    Medication needed:   Requested Prescriptions     Pending Prescriptions Disp Refills   • NP Thyroid 30 MG tablet 135 tablet 1     Sig: Take 1.5 tablets by mouth Daily.   • venlafaxine (EFFEXOR) 100 MG tablet 180 tablet 1     Sig: Take 1 tablet by mouth 2 (Two) Times a Day.       When do you need the refill by:    What additional details did the patient provide when requesting the medication: PATIENT IS REQUESTING REFILLS ON ABOVE MEDICATIONS    Does the patient have less than a 3 day supply:  [] Yes  [x] No    What is the patient's preferred pharmacy: [facility] [place] - [address] [place] [address] [phone] [facility] [phone] FX

## 2021-03-24 NOTE — TELEPHONE ENCOUNTER
HOME HEALTH NURSE IS REQUESTING A VERBAL ORDER FOR NURSING REASSESSMENT FOR PATIENT'S NEXT CERTIFICATION PERIOD.    CALL BACK NUMBER [phone]

## 2021-03-25 NOTE — TELEPHONE ENCOUNTER
Marycarmen has been notified.    Marycarmen also states that the patient seems more depressed and has not really bathed in about 6-7 weeks. Marycarmen states that the patient is a little sensitive about the subject.    Marycarmen wanted this to be noted in the patient's chart.

## 2021-03-25 NOTE — TELEPHONE ENCOUNTER
Would you be ok with this?    Dana I am forwarding you this message since Dr. Luis is out of the office.

## 2021-03-26 ENCOUNTER — OFFICE VISIT (OUTPATIENT)
Dept: FAMILY MEDICINE CLINIC | Facility: CLINIC | Age: 76
End: 2021-03-26

## 2021-03-26 VITALS
SYSTOLIC BLOOD PRESSURE: 130 MMHG | BODY MASS INDEX: 29.7 KG/M2 | WEIGHT: 196 LBS | HEART RATE: 94 BPM | DIASTOLIC BLOOD PRESSURE: 80 MMHG | OXYGEN SATURATION: 98 % | HEIGHT: 68 IN

## 2021-03-26 DIAGNOSIS — F33.9 MAJOR DEPRESSION, RECURRENT, CHRONIC (HCC): ICD-10-CM

## 2021-03-26 DIAGNOSIS — E03.9 ACQUIRED HYPOTHYROIDISM: Primary | ICD-10-CM

## 2021-03-26 DIAGNOSIS — M43.6 SPASTIC TORTICOLLIS: ICD-10-CM

## 2021-03-26 DIAGNOSIS — I10 ESSENTIAL HYPERTENSION: ICD-10-CM

## 2021-03-26 DIAGNOSIS — Z78.9 IMPAIRED MOBILITY AND ADLS: ICD-10-CM

## 2021-03-26 DIAGNOSIS — Z74.09 IMPAIRED MOBILITY AND ADLS: ICD-10-CM

## 2021-03-26 DIAGNOSIS — I48.19 ATRIAL FIBRILLATION, PERSISTENT (HCC): ICD-10-CM

## 2021-03-26 PROCEDURE — 99214 OFFICE O/P EST MOD 30 MIN: CPT | Performed by: NURSE PRACTITIONER

## 2021-03-26 RX ORDER — VENLAFAXINE 100 MG/1
100 TABLET ORAL 2 TIMES DAILY
Qty: 180 TABLET | Refills: 1 | Status: SHIPPED | OUTPATIENT
Start: 2021-03-26 | End: 2022-05-04

## 2021-03-26 RX ORDER — POTASSIUM CHLORIDE 750 MG/1
10 TABLET, FILM COATED, EXTENDED RELEASE ORAL DAILY
COMMUNITY
Start: 2021-02-27 | End: 2021-03-26 | Stop reason: SDUPTHER

## 2021-03-26 RX ORDER — LEVOTHYROXINE, LIOTHYRONINE 19; 4.5 UG/1; UG/1
45 TABLET ORAL DAILY
Qty: 135 TABLET | Refills: 1 | Status: SHIPPED | OUTPATIENT
Start: 2021-03-26 | End: 2021-03-30 | Stop reason: DRUGHIGH

## 2021-03-26 NOTE — PROGRESS NOTES
Subjective     Chief Complaint:    Chief Complaint   Patient presents with   • Follow-up     HTN and hypothyroidism; patient c/o occasional shortness of breath       History of Present Illness:   Here for home health recert  She has HTN. Takes norvasc, lisinopril, metoprolol.   Also has refractory depression. Follows with behav health. On elavil, effexor, and wellbutrin. Mood still not controlled.   She has chronic limited mobility, uses walker. Has chronic soa.   She has hx of afib, she is on AC  She has trouble taking her medication. Has home nursing come out to help with organizing her medications. This has helped increased her compliance.   She has hypothyroidism. Takes NP thyroid.   She has spastic torticollis. Hurts but not too severe at this time. She is not taking any medication for it at this time.     Review of Systems  Gen- No fevers, chills  CV- No chest pain, palpitations  Resp- No cough, dyspnea  GI- No N/V/D, abd pain  Neuro-No dizziness, headaches      I have reviewed and/or updated the patient's past medical, surgical, family, social history and problem list as appropriate.     Medications:    Current Outpatient Medications:   •  amitriptyline (ELAVIL) 25 MG tablet, 1-2 po q hs, Disp: 60 tablet, Rfl: 3  •  amLODIPine (NORVASC) 5 MG tablet, Take 1 tablet by mouth Daily., Disp: 90 tablet, Rfl: 3  •  buPROPion XL (Wellbutrin XL) 150 MG 24 hr tablet, One po daily With 300mg, Disp: 30 tablet, Rfl: 2  •  buPROPion XL (Wellbutrin XL) 300 MG 24 hr tablet, Take 1 tablet by mouth Daily., Disp: 30 tablet, Rfl: 5  •  Eliquis 5 MG tablet tablet, Take 1 tablet by mouth Every 12 (Twelve) Hours., Disp: 180 tablet, Rfl: 1  •  lisinopril (PRINIVIL,ZESTRIL) 40 MG tablet, Take 1 tablet by mouth Daily., Disp: 90 tablet, Rfl: 1  •  metoprolol succinate XL (TOPROL-XL) 200 MG 24 hr tablet, Take 1 tablet by mouth Daily., Disp: 90 tablet, Rfl: 1  •  Misc. Devices (Bath/Shower Seat) misc, 1 Units As Needed (bathing).,  "Disp: 1 each, Rfl: 0  •  Misc. Devices (Rollator Ultra-Light) misc, 1 Units As Needed (ambulation assistance)., Disp: 1 each, Rfl: 0  •  NP Thyroid 30 MG tablet, Take 1.5 tablets by mouth Daily., Disp: 135 tablet, Rfl: 1  •  pantoprazole (PROTONIX) 40 MG EC tablet, Take 1 tablet by mouth Daily., Disp: 90 tablet, Rfl: 1  •  potassium chloride (K-DUR,KLOR-CON) 10 MEQ CR tablet, Take 1 tablet by mouth Daily., Disp: 30 tablet, Rfl: 3  •  potassium chloride 10 MEQ CR tablet, Take 10 mEq by mouth Daily., Disp: , Rfl:   •  venlafaxine (EFFEXOR) 100 MG tablet, Take 1 tablet by mouth 2 (Two) Times a Day., Disp: 180 tablet, Rfl: 1  •  Diclofenac Sodium (VOLTAREN) 1 % gel gel, Apply 4 g topically to the appropriate area as directed 4 (Four) Times a Day As Needed (pain)., Disp: 100 g, Rfl: 2    Allergies:  Allergies   Allergen Reactions   • Penicillins Anaphylaxis   • Sulfa Antibiotics Other (See Comments)     Patient states \"it made me want to go to bed, I didn't have a fever but I felt like I did\"        Objective     Vital Signs:   Vitals:    03/26/21 1358   BP: 130/80   Pulse: 94   SpO2: 98%   Weight: 88.9 kg (196 lb)   Height: 172.7 cm (68\")       Physical Exam:    Physical Exam  Vitals and nursing note reviewed.   Constitutional:       Appearance: She is well-developed. She is obese.   HENT:      Head: Normocephalic and atraumatic.   Eyes:      Pupils: Pupils are equal, round, and reactive to light.   Cardiovascular:      Rate and Rhythm: Normal rate and regular rhythm.      Heart sounds: Normal heart sounds.   Pulmonary:      Effort: Pulmonary effort is normal.      Breath sounds: Normal breath sounds.   Abdominal:      General: Bowel sounds are normal. There is no distension.      Palpations: Abdomen is soft.      Tenderness: There is no abdominal tenderness.   Musculoskeletal:      Cervical back: Neck supple.   Skin:     General: Skin is warm and dry.      Capillary Refill: Capillary refill takes less than 2 seconds. "   Neurological:      General: No focal deficit present.      Mental Status: She is alert and oriented to person, place, and time.   Psychiatric:         Mood and Affect: Mood normal.         Behavior: Behavior normal.         Assessment / Plan     Assessment/Plan:   Problem List Items Addressed This Visit        Cardiac and Vasculature    Atrial fibrillation, persistent    Overview     a. Diagnosed in 1999, initially treated with Betapace, chronic Coumadin therapy.  b. Status post successful external cardioversion September 2003.    c. Echocardiogram, 08/27/2003:  Mild concentric LVH, LA 4.6, normal EF.  d. Holter monitor, 10/10/2003:  Heart rate 60 to 124 beats per minute, average 78 beats per minute with PACs, PVCs, and sinus rhythm.  e. Successful external cardioversion after Tikosyn initiation, 03/31/2004.  f. Recent persistent atrial fibrillation/documented per hospital visit at [facility] November 2015.   g. 02/23/2016: EKG reveals atrial fibrillation with heart rate 102 beats per minute.-  h. Holter 2/17 average heart rate 90 with chronic afib and occasional pvc's           Relevant Medications    amLODIPine (NORVASC) 5 MG tablet    metoprolol succinate XL (TOPROL-XL) 200 MG 24 hr tablet    Other Relevant Orders    Ambulatory Referral to Home Health    Essential hypertension    Relevant Medications    amLODIPine (NORVASC) 5 MG tablet    lisinopril (PRINIVIL,ZESTRIL) 40 MG tablet    metoprolol succinate XL (TOPROL-XL) 200 MG 24 hr tablet    potassium chloride (K-DUR,KLOR-CON) 10 MEQ CR tablet    Other Relevant Orders    Ambulatory Referral to Home Health       Endocrine and Metabolic    Acquired hypothyroidism - Primary    Overview     recent TSH abnormal hyperthyroid (patient noncompliant with reduced dose of [place] Thyroid).   Iatrogenic hyperthroidism         Relevant Medications    metoprolol succinate XL (TOPROL-XL) 200 MG 24 hr tablet    NP Thyroid 30 MG tablet    Other Relevant Orders    Ambulatory  Referral to Home Health       Mental Health    Major depression, recurrent, chronic (CMS/HCC)    Relevant Medications    amitriptyline (ELAVIL) 25 MG tablet    buPROPion XL (Wellbutrin XL) 150 MG 24 hr tablet    buPROPion XL (Wellbutrin XL) 300 MG 24 hr tablet    venlafaxine (EFFEXOR) 100 MG tablet    Other Relevant Orders    Ambulatory Referral to Home Health       Musculoskeletal and Injuries    Spastic torticollis    Relevant Medications    Diclofenac Sodium (VOLTAREN) 1 % gel gel      Other Visit Diagnoses     Impaired mobility and ADLs        Relevant Orders    Ambulatory Referral to Home Health        -- continue home health, this is helping increase her medication compliance   -- BP stable on current regimen. Continue  -- attempt labs today  -- follows with behav health for refractory depression. Continue  -- continue bb and eliquis. She is in afib today but rate is controlled  -- trial diclofenac gel for spastic torticollis    Follow up:  3 months PCP    Electronically signed by MARIA L Sena   03/26/2021 14:22 EDT      Please note that portions of this note may have been completed with a voice recognition program. Efforts were made to edit the dictations, but occasionally words are mistranscribed.

## 2021-03-27 LAB
ALBUMIN SERPL-MCNC: 4.2 G/DL (ref 3.5–5.2)
ALBUMIN/GLOB SERPL: 1.7 G/DL
ALP SERPL-CCNC: 95 U/L (ref 39–117)
ALT SERPL-CCNC: 22 U/L (ref 1–33)
AST SERPL-CCNC: 19 U/L (ref 1–32)
BASOPHILS # BLD AUTO: 0.05 10*3/MM3 (ref 0–0.2)
BASOPHILS NFR BLD AUTO: 0.9 % (ref 0–1.5)
BILIRUB SERPL-MCNC: 0.4 MG/DL (ref 0–1.2)
BUN SERPL-MCNC: 16 MG/DL (ref 8–23)
BUN/CREAT SERPL: 18 (ref 7–25)
CALCIUM SERPL-MCNC: 9.3 MG/DL (ref 8.6–10.5)
CHLORIDE SERPL-SCNC: 104 MMOL/L (ref 98–107)
CO2 SERPL-SCNC: 28.6 MMOL/L (ref 22–29)
CREAT SERPL-MCNC: 0.89 MG/DL (ref 0.57–1)
EOSINOPHIL # BLD AUTO: 0.17 10*3/MM3 (ref 0–0.4)
EOSINOPHIL NFR BLD AUTO: 3.1 % (ref 0.3–6.2)
ERYTHROCYTE [DISTWIDTH] IN BLOOD BY AUTOMATED COUNT: 13 % (ref 12.3–15.4)
GLOBULIN SER CALC-MCNC: 2.5 GM/DL
GLUCOSE SERPL-MCNC: 95 MG/DL (ref 65–99)
HCT VFR BLD AUTO: 43 % (ref 34–46.6)
HGB BLD-MCNC: 14.6 G/DL (ref 12–15.9)
IMM GRANULOCYTES # BLD AUTO: 0.01 10*3/MM3 (ref 0–0.05)
IMM GRANULOCYTES NFR BLD AUTO: 0.2 % (ref 0–0.5)
LYMPHOCYTES # BLD AUTO: 2.19 10*3/MM3 (ref 0.7–3.1)
LYMPHOCYTES NFR BLD AUTO: 39.3 % (ref 19.6–45.3)
MCH RBC QN AUTO: 30.8 PG (ref 26.6–33)
MCHC RBC AUTO-ENTMCNC: 34 G/DL (ref 31.5–35.7)
MCV RBC AUTO: 90.7 FL (ref 79–97)
MONOCYTES # BLD AUTO: 0.43 10*3/MM3 (ref 0.1–0.9)
MONOCYTES NFR BLD AUTO: 7.7 % (ref 5–12)
NEUTROPHILS # BLD AUTO: 2.72 10*3/MM3 (ref 1.7–7)
NEUTROPHILS NFR BLD AUTO: 48.8 % (ref 42.7–76)
NRBC BLD AUTO-RTO: 0 /100 WBC (ref 0–0.2)
PLATELET # BLD AUTO: 233 10*3/MM3 (ref 140–450)
POTASSIUM SERPL-SCNC: 4.8 MMOL/L (ref 3.5–5.2)
PROT SERPL-MCNC: 6.7 G/DL (ref 6–8.5)
RBC # BLD AUTO: 4.74 10*6/MM3 (ref 3.77–5.28)
SODIUM SERPL-SCNC: 140 MMOL/L (ref 136–145)
T4 FREE SERPL-MCNC: 0.88 NG/DL (ref 0.93–1.7)
TSH SERPL DL<=0.005 MIU/L-ACNC: 6.12 UIU/ML (ref 0.27–4.2)
WBC # BLD AUTO: 5.57 10*3/MM3 (ref 3.4–10.8)

## 2021-03-30 DIAGNOSIS — E03.9 ACQUIRED HYPOTHYROIDISM: Primary | ICD-10-CM

## 2021-03-30 RX ORDER — LEVOTHYROXINE AND LIOTHYRONINE 38; 9 UG/1; UG/1
60 TABLET ORAL DAILY
Qty: 90 TABLET | Refills: 1 | Status: SHIPPED | OUTPATIENT
Start: 2021-03-30 | End: 2021-05-14 | Stop reason: SDUPTHER

## 2021-04-01 ENCOUNTER — TELEPHONE (OUTPATIENT)
Dept: FAMILY MEDICINE CLINIC | Facility: CLINIC | Age: 76
End: 2021-04-01

## 2021-04-01 NOTE — TELEPHONE ENCOUNTER
Caller: MAYUR    Relationship: HOME HEALTH NURSE    Best call back number: [phone]    What was the call regarding: MAYUR CALLED TO SEE IF Thyroid (NP THYROID) 60 MG PO tablet  IS TO REPLACE THE 45 MG?    Do you require a callback: YES, SHE NEEDS TO PUT AN ORDER IN FOR THIS MEDICATION

## 2021-04-02 NOTE — TELEPHONE ENCOUNTER
Caller: HOME HEALTH     Relationship: NURSE (MAYUR)    Best call back number: [phone]    Who are you requesting to speak with (clinical staff, provider,  specific staff member): OLYA SMITH     What was the call regarding: MAYUR HOME HEALTH NURSE CALLED AND STATED SHE WOULD LIKE A VERBAL APPROVAL TO FILL PATIENT'S MEDICATION BOX EVERY OTHER WEEK     Do you require a callback: YES

## 2021-04-05 ENCOUNTER — OFFICE VISIT (OUTPATIENT)
Dept: FAMILY MEDICINE CLINIC | Facility: CLINIC | Age: 76
End: 2021-04-05

## 2021-04-05 ENCOUNTER — TELEPHONE (OUTPATIENT)
Dept: FAMILY MEDICINE CLINIC | Facility: CLINIC | Age: 76
End: 2021-04-05

## 2021-04-05 VITALS
BODY MASS INDEX: 29.8 KG/M2 | TEMPERATURE: 97.4 F | SYSTOLIC BLOOD PRESSURE: 132 MMHG | DIASTOLIC BLOOD PRESSURE: 80 MMHG | HEART RATE: 87 BPM | HEIGHT: 68 IN | OXYGEN SATURATION: 97 %

## 2021-04-05 DIAGNOSIS — R07.81 RIB PAIN ON LEFT SIDE: Primary | ICD-10-CM

## 2021-04-05 PROCEDURE — 99213 OFFICE O/P EST LOW 20 MIN: CPT | Performed by: NURSE PRACTITIONER

## 2021-04-05 RX ORDER — TRAMADOL HYDROCHLORIDE 50 MG/1
50 TABLET ORAL EVERY 8 HOURS PRN
Qty: 12 TABLET | Refills: 0 | Status: SHIPPED | OUTPATIENT
Start: 2021-04-05 | End: 2022-10-18

## 2021-04-05 NOTE — TELEPHONE ENCOUNTER
Caller: RACHEL Meraz call back number: [phone]    REPORTING THAT  THE MEDICATION    Diclofenac Sodium (VOLTAREN) 1 % gel gel    HAS POSSIBLE INTERACTIONS WITH    Eliquis 5 MG tablet tablet  lisinopril (PRINIVIL,ZESTRIL) 40 MG tablet  metoprolol succinate XL (TOPROL-XL) 200 MG 24 hr tablet  venlafaxine (EFFEXOR) 100 MG tablet

## 2021-04-05 NOTE — PROGRESS NOTES
"      Subjective     Chief Complaint:    Chief Complaint   Patient presents with   • Abdominal Pain     right below her ribcage       History of Present Illness:   Left \"side\" pain. Radiates to back but not to spine. Hurts when she takes a deep breath. Hurts when she tries to get up.   No N/V. No constipation. No diarrhea.   No change in activity.  No recent big falls but has slid off the side of her bed due to her mattress sinking.       Review of Systems  Gen- No fevers, chills  CV- No chest pain, palpitations  Resp- No cough, dyspnea  GI- No N/V/D, abd pain  Neuro-No dizziness, headaches      I have reviewed and/or updated the patient's past medical, surgical, family, social history and problem list as appropriate.     Medications:    Current Outpatient Medications:   •  amitriptyline (ELAVIL) 25 MG tablet, 1-2 po q hs, Disp: 60 tablet, Rfl: 3  •  amLODIPine (NORVASC) 5 MG tablet, Take 1 tablet by mouth Daily., Disp: 90 tablet, Rfl: 3  •  buPROPion XL (Wellbutrin XL) 150 MG 24 hr tablet, One po daily With 300mg, Disp: 30 tablet, Rfl: 2  •  buPROPion XL (Wellbutrin XL) 300 MG 24 hr tablet, Take 1 tablet by mouth Daily., Disp: 30 tablet, Rfl: 5  •  Diclofenac Sodium (VOLTAREN) 1 % gel gel, Apply 4 g topically to the appropriate area as directed 4 (Four) Times a Day As Needed (pain)., Disp: 100 g, Rfl: 2  •  Eliquis 5 MG tablet tablet, Take 1 tablet by mouth Every 12 (Twelve) Hours., Disp: 180 tablet, Rfl: 1  •  lisinopril (PRINIVIL,ZESTRIL) 40 MG tablet, Take 1 tablet by mouth Daily., Disp: 90 tablet, Rfl: 1  •  metoprolol succinate XL (TOPROL-XL) 200 MG 24 hr tablet, Take 1 tablet by mouth Daily., Disp: 90 tablet, Rfl: 1  •  Misc. Devices (Bath/Shower Seat) misc, 1 Units As Needed (bathing)., Disp: 1 each, Rfl: 0  •  Misc. Devices (Rollator Ultra-Light) misc, 1 Units As Needed (ambulation assistance)., Disp: 1 each, Rfl: 0  •  pantoprazole (PROTONIX) 40 MG EC tablet, Take 1 tablet by mouth Daily., Disp: 90 tablet, " "Rfl: 1  •  potassium chloride (K-DUR,KLOR-CON) 10 MEQ CR tablet, Take 1 tablet by mouth Daily., Disp: 30 tablet, Rfl: 3  •  Thyroid (NP THYROID) 60 MG PO tablet, Take 1 tablet by mouth Daily., Disp: 90 tablet, Rfl: 1  •  venlafaxine (EFFEXOR) 100 MG tablet, Take 1 tablet by mouth 2 (Two) Times a Day., Disp: 180 tablet, Rfl: 1  •  traMADol (ULTRAM) 50 MG tablet, Take 1 tablet by mouth Every 8 (Eight) Hours As Needed for Severe Pain ., Disp: 12 tablet, Rfl: 0    Allergies:  Allergies   Allergen Reactions   • Penicillins Anaphylaxis   • Sulfa Antibiotics Other (See Comments)     Patient states \"it made me want to go to bed, I didn't have a fever but I felt like I did\"        Objective     Vital Signs:   Vitals:    04/05/21 1554   BP: 132/80   Pulse: 87   Temp: 97.4 °F (36.3 °C)   SpO2: 97%   Weight: Comment: unable   Height: 172.7 cm (68\")   PainSc: 10-Worst pain ever       Physical Exam:    Physical Exam  Vitals and nursing note reviewed.   Constitutional:       Appearance: She is well-developed. She is obese.   HENT:      Head: Normocephalic and atraumatic.   Eyes:      Pupils: Pupils are equal, round, and reactive to light.   Cardiovascular:      Rate and Rhythm: Normal rate and regular rhythm.      Heart sounds: Normal heart sounds.   Pulmonary:      Effort: Pulmonary effort is normal.      Breath sounds: Normal breath sounds.   Abdominal:      General: Bowel sounds are normal. There is no distension.      Palpations: Abdomen is soft.      Tenderness: There is abdominal tenderness.      Comments: LUQ close to ribs    Musculoskeletal:         General: No swelling.      Cervical back: Neck supple.      Comments:  Extreme TTP left ribs   Skin:     General: Skin is warm and dry.      Capillary Refill: Capillary refill takes less than 2 seconds.   Neurological:      General: No focal deficit present.      Mental Status: She is alert and oriented to person, place, and time.   Psychiatric:         Mood and Affect: Mood " normal.         Behavior: Behavior normal.         Assessment / Plan     Assessment/Plan:   Problem List Items Addressed This Visit     None      Visit Diagnoses     Rib pain on left side    -  Primary    Relevant Medications    traMADol (ULTRAM) 50 MG tablet    Other Relevant Orders    XR Ribs Left With PA Chest (Completed)        -- discussed differentials with patient   --Check x-ray.  Short course of tramadol.  Continue Voltaren gel.     Follow up:  As scheduled with PCP    Electronically signed by MARIA L Sena   04/05/2021 16:12 EDT      Please note that portions of this note may have been completed with a voice recognition program. Efforts were made to edit the dictations, but occasionally words are mistranscribed.

## 2021-04-06 NOTE — TELEPHONE ENCOUNTER
Caller: MAYUR - [facility] [place]    Relationship: Self    Best call back number: [phone]    What is the best time to reach you: ANYTIME    Who are you requesting to speak with (clinical staff, provider,  specific staff member): MATT    Do you know the name of the person who called: MAYUR    What was the call regarding: PATIENT DID SEE DOCTOR YESTERDAY 4/5 AND XRAYS WERE DONE 4/6    Do you require a callback: NO

## 2021-04-14 ENCOUNTER — TELEPHONE (OUTPATIENT)
Dept: FAMILY MEDICINE CLINIC | Facility: CLINIC | Age: 76
End: 2021-04-14

## 2021-04-14 NOTE — TELEPHONE ENCOUNTER
DELETE AFTER REVIEWING: Telephone encounter to be sent to the  pool     Caller: shadi allen [facility]    Relationship:     Best call back number: [phone]    What is the best time to reach you: ANYTIME    Who are you requesting to speak with (clinical staff, provider,  specific staff member):     What was the call regarding: CHANGES TO HOME [facility] TO PATIENT GERONIMO VASQUEZ    Do you require a callback: YES

## 2021-04-15 ENCOUNTER — TELEPHONE (OUTPATIENT)
Dept: FAMILY MEDICINE CLINIC | Facility: CLINIC | Age: 76
End: 2021-04-15

## 2021-04-15 NOTE — TELEPHONE ENCOUNTER
Spoke with Marycarmen at [facility]; they will no longer be offering traditional home health.  Marycarmen is still her  for home and [facility].  She will need a referral to for another home health agency.  They will no longer be providing this service as of 05/13/2021.

## 2021-04-15 NOTE — TELEPHONE ENCOUNTER
Spoke with Marycarmen at [facility].  She said with Millie has terrible anxiety and isn't sure she wants a stranger in her home.  She would like to think about home health for a few days.  Marycarmen will go out and see her on Tuesday and let us know what Ms Richardson decides.  Thanks

## 2021-04-15 NOTE — TELEPHONE ENCOUNTER
Caller: PHILIPPE    Relationship: NURSE    Best call back number: [phone]    Additional notes: MAYUR FROM [facility] WOULD LIKE TO SPEAK TO MATT SMITH ABOUT PATIENT. PATIENT WOULD LIKE FOR PCP TO PLEASE WAIT UNTIL NEXT WEEK UNTIL MAKING ANY REFERRALS BECAUSE THEY WOULD LIKE A FEW DAYS TO THINK ABOUT ANOTHER HOME HEALTH AGENCY. PATIENT MAY POSSIBLY HAVE MEDICATIONS FILLED AT [facility] BUT THEY HAVE NOT MADE A PERMANENT CHOICE JUST YET.

## 2021-04-19 ENCOUNTER — OFFICE VISIT (OUTPATIENT)
Dept: PSYCHIATRY | Facility: CLINIC | Age: 76
End: 2021-04-19

## 2021-04-19 DIAGNOSIS — F33.1 MODERATE EPISODE OF RECURRENT MAJOR DEPRESSIVE DISORDER (HCC): Primary | ICD-10-CM

## 2021-04-19 DIAGNOSIS — F43.89 OTHER REACTIONS TO SEVERE STRESS: ICD-10-CM

## 2021-04-19 PROCEDURE — 90837 PSYTX W PT 60 MINUTES: CPT | Performed by: SOCIAL WORKER

## 2021-04-19 NOTE — PROGRESS NOTES
Date: April 22, 2021  Time In: 1:30 pm   Time Out: 2:30 pm       PROGRESS NOTE  Data:  Millie Richardson is a 75 y.o. female who presents today for individual therapy session at [facility].     Patient continues to discuss past trauma from her mother.       Clinical Maneuvering/Intervention:    Assisted patient in processing above session content; acknowledged and normalized patient’s thoughts, feelings, and concerns.  Rationalized patient thought process regarding depression.  Discussed triggers associated with patient's severe stress.  Also discussed coping skills for patient to implement such as severe stress.    Allowed patient to freely discuss issues without interruption or judgment. Provided safe, confidential environment to facilitate the development of positive therapeutic relationship and encourage open, honest communication. Assisted patient in identifying risk factors which would indicate the need for higher level of care including thoughts to harm self or others and/or self-harming behavior and encouraged patient to contact this office, call 911, or present to the nearest emergency room should any of these events occur. Discussed crisis intervention services and means to access. Patient adamantly and convincingly denies current suicidal or homicidal ideation or perceptual disturbance.    Assessment   Patient appears to maintain relative stability as compared to their baseline.  However, patient continues to struggle with stress which continues to cause impairment in important areas of functioning.  A result, they can be reasonably expected to continue to benefit from treatment and would likely be at increased risk for decompensation otherwise.    Mental Status Exam:   Hygiene:   good  Cooperation:  Cooperative  Eye Contact:  Poor  Psychomotor Behavior:  Appropriate  Affect:  Full range  Mood: normal  Speech:  Normal  Thought Process:  Goal directed  Thought Content:  Normal  Suicidal:   None  Homicidal:  None  Hallucinations:  None  Delusion:  None  Memory:  Intact  Orientation:  Person, Place, Time and Situation  Reliability:  good  Insight:  Good  Judgement:  Good  Impulse Control:  Good  Physical/Medical Issues:  Yes         Patient's Support Network Includes:  extended family and Patient has very limited support network    Functional Status: Severe impairment    Progress toward goal: Not at goal    Prognosis: Good with Ongoing Treatment          Plan     Patient will continue in individual outpatient therapy with focus on improved functioning and coping skills, maintaining stability, and avoiding decompensation and the need for higher level of care.    Patient will adhere to medication regimen as prescribed and report any side effects. Patient will contact this office, call 911 or present to the nearest emergency room should suicidal or homicidal ideations occur. Provide Cognitive Behavioral Therapy and Solution Focused Therapy to improve functioning, maintain stability, and avoid decompensation and the need for higher level of care.     Return in about 2 weeks, or earlier if symptoms worsen or fail to improve.           VISIT DIAGNOSIS:     ICD-10-CM ICD-9-CM   1. Moderate episode of recurrent major depressive disorder (CMS/HCC)  F33.1 296.32   2. Other reactions to severe stress  F43.8 309.89             This document has been electronically signed by Shayla Galloway LCSW  April 22, 2021 15:25 EDT      Part of this note may be an electronic transcription/translation of spoken language to printed text using the Dragon Dictation System.

## 2021-04-21 ENCOUNTER — TELEPHONE (OUTPATIENT)
Dept: FAMILY MEDICINE CLINIC | Facility: CLINIC | Age: 76
End: 2021-04-21

## 2021-04-21 RX ORDER — AMLODIPINE BESYLATE 5 MG/1
5 TABLET ORAL DAILY
Qty: 90 TABLET | Refills: 3 | Status: SHIPPED | OUTPATIENT
Start: 2021-04-21 | End: 2022-03-22

## 2021-04-21 NOTE — TELEPHONE ENCOUNTER
Caller: MAYUR     Relationship: [facility]     Best call back number: [phone]    What is the best time to reach you: ANYTIME     Who are you requesting to speak with (clinical staff, provider,  specific staff member): CLINICAL STAFF: MATT     Do you know the name of the person who called: MAYUR     Do you require a callback: YES

## 2021-04-26 ENCOUNTER — TELEPHONE (OUTPATIENT)
Dept: FAMILY MEDICINE CLINIC | Facility: CLINIC | Age: 76
End: 2021-04-26

## 2021-04-26 NOTE — TELEPHONE ENCOUNTER
Provider: XUAN COLLIER  Caller: MAYUR  Relationship to Patient:HOME HEALTH NURSE  Phone Number: [phone]  Reason for Call: HOME HEALTH NURSE MAYUR CALLED AND WANTED TO LET MATT HICKMAN'S NURSE KNOW THAT GERONIMO STATED THAT SHE WOULD GO TO ANY HOME HEALTH OF DR. HICKMAN'S CHOICE. PLEASE GIVE MAYUR A CALL BACK. SHE WOULD NEED TO CLOSE OUT THE DAY BEFORE SHE GETS SCHEDULED FOR SOMEONE ELSE

## 2021-04-27 NOTE — TELEPHONE ENCOUNTER
Can you please put in a new referral for Millie?  [facility] will no longer be able to see her after 05/13/2021

## 2021-04-27 NOTE — TELEPHONE ENCOUNTER
MAYUR STATES THAT PATIENT IS VERY OVERWHELMED BY HOME HEALTH CLOSING.    MAYUR WOULD LIKE TO KNOW IF WE COULD GET PATIENT IN THE BEHAVORIAL HEALTH APPOINTMENT ANY SOONER THAN July.    CAN CONTACT MAYUR @ [phone]  OR PATIENT@ [phone]

## 2021-05-05 DIAGNOSIS — I10 ESSENTIAL HYPERTENSION: ICD-10-CM

## 2021-05-05 RX ORDER — POTASSIUM CHLORIDE 20 MEQ/1
TABLET, EXTENDED RELEASE ORAL
Qty: 30 TABLET | Refills: 5 | Status: SHIPPED | OUTPATIENT
Start: 2021-05-05 | End: 2021-05-14

## 2021-05-07 ENCOUNTER — TELEPHONE (OUTPATIENT)
Dept: FAMILY MEDICINE CLINIC | Facility: CLINIC | Age: 76
End: 2021-05-07

## 2021-05-07 NOTE — TELEPHONE ENCOUNTER
Patients needs a new referral for Home Health. [facility] will no longer be seeing her.

## 2021-05-07 NOTE — TELEPHONE ENCOUNTER
MAYUR DAVENPORT, PATIENT'S NURSE WITH [facility] IS NEEDING A CALL BACK TO PLAN HER DISCHARGE DATE [phone].

## 2021-05-10 ENCOUNTER — LAB (OUTPATIENT)
Dept: FAMILY MEDICINE CLINIC | Facility: CLINIC | Age: 76
End: 2021-05-10

## 2021-05-10 NOTE — TELEPHONE ENCOUNTER
Spoke with Marycarmen.  She is closing [facility] out tomorrow 05/11/2021; she will need a referral to a new home health agency

## 2021-05-13 DIAGNOSIS — Z74.09 IMPAIRED MOBILITY AND ADLS: Primary | ICD-10-CM

## 2021-05-13 DIAGNOSIS — M17.0 TRICOMPARTMENT OSTEOARTHRITIS OF BOTH KNEES: ICD-10-CM

## 2021-05-13 DIAGNOSIS — Z78.9 IMPAIRED MOBILITY AND ADLS: Primary | ICD-10-CM

## 2021-05-13 DIAGNOSIS — I50.32 CHRONIC DIASTOLIC (CONGESTIVE) HEART FAILURE (HCC): ICD-10-CM

## 2021-05-14 ENCOUNTER — OFFICE VISIT (OUTPATIENT)
Dept: FAMILY MEDICINE CLINIC | Facility: CLINIC | Age: 76
End: 2021-05-14

## 2021-05-14 VITALS
DIASTOLIC BLOOD PRESSURE: 80 MMHG | HEIGHT: 68 IN | WEIGHT: 213 LBS | SYSTOLIC BLOOD PRESSURE: 130 MMHG | BODY MASS INDEX: 32.28 KG/M2

## 2021-05-14 DIAGNOSIS — E03.9 ACQUIRED HYPOTHYROIDISM: Primary | ICD-10-CM

## 2021-05-14 DIAGNOSIS — K21.9 GASTROESOPHAGEAL REFLUX DISEASE WITHOUT ESOPHAGITIS: Chronic | ICD-10-CM

## 2021-05-14 DIAGNOSIS — I10 ESSENTIAL HYPERTENSION: ICD-10-CM

## 2021-05-14 DIAGNOSIS — R42 PAROXYSMAL VERTIGO: ICD-10-CM

## 2021-05-14 DIAGNOSIS — I50.32 CHRONIC DIASTOLIC (CONGESTIVE) HEART FAILURE (HCC): ICD-10-CM

## 2021-05-14 DIAGNOSIS — G90.9 AUTONOMIC INSTABILITY: ICD-10-CM

## 2021-05-14 DIAGNOSIS — M17.0 TRICOMPARTMENT OSTEOARTHRITIS OF BOTH KNEES: ICD-10-CM

## 2021-05-14 PROCEDURE — 99214 OFFICE O/P EST MOD 30 MIN: CPT | Performed by: FAMILY MEDICINE

## 2021-05-14 RX ORDER — LEVOTHYROXINE AND LIOTHYRONINE 57; 13.5 UG/1; UG/1
90 TABLET ORAL DAILY
Qty: 90 TABLET | Refills: 0 | Status: SHIPPED | OUTPATIENT
Start: 2021-05-14 | End: 2021-08-09

## 2021-05-15 LAB
BUN SERPL-MCNC: 16 MG/DL (ref 8–27)
BUN/CREAT SERPL: 21 (ref 12–28)
CALCIUM SERPL-MCNC: 9.2 MG/DL (ref 8.7–10.3)
CHLORIDE SERPL-SCNC: 102 MMOL/L (ref 96–106)
CO2 SERPL-SCNC: 26 MMOL/L (ref 20–29)
CREAT SERPL-MCNC: 0.78 MG/DL (ref 0.57–1)
GLUCOSE SERPL-MCNC: 92 MG/DL (ref 65–99)
POTASSIUM SERPL-SCNC: 4.4 MMOL/L (ref 3.5–5.2)
SODIUM SERPL-SCNC: 140 MMOL/L (ref 134–144)
VIT B12 SERPL-MCNC: 360 PG/ML (ref 232–1245)

## 2021-05-18 ENCOUNTER — OUTSIDE FACILITY SERVICE (OUTPATIENT)
Dept: FAMILY MEDICINE CLINIC | Facility: CLINIC | Age: 76
End: 2021-05-18

## 2021-05-28 DIAGNOSIS — I10 ESSENTIAL HYPERTENSION: ICD-10-CM

## 2021-05-28 PROCEDURE — G0180 MD CERTIFICATION HHA PATIENT: HCPCS | Performed by: FAMILY MEDICINE

## 2021-05-28 RX ORDER — POTASSIUM CHLORIDE 750 MG/1
TABLET, FILM COATED, EXTENDED RELEASE ORAL
Qty: 30 TABLET | Refills: 3 | Status: SHIPPED | OUTPATIENT
Start: 2021-05-28 | End: 2021-08-16 | Stop reason: SDUPTHER

## 2021-06-28 ENCOUNTER — TELEPHONE (OUTPATIENT)
Dept: FAMILY MEDICINE CLINIC | Facility: CLINIC | Age: 76
End: 2021-06-28

## 2021-06-30 DIAGNOSIS — E03.9 ACQUIRED HYPOTHYROIDISM: Primary | ICD-10-CM

## 2021-07-07 ENCOUNTER — TELEPHONE (OUTPATIENT)
Dept: FAMILY MEDICINE CLINIC | Facility: CLINIC | Age: 76
End: 2021-07-07

## 2021-07-07 NOTE — TELEPHONE ENCOUNTER
Caller: Millie Richardson    Relationship: Self    Best call back number [phone]    What is the best time to reach you: ANYTIME AFTERNOONS ARE USUALLY BETTER    Who are you requesting to speak with (clinical staff, provider,  specific staff member): CLINICAL STAFF    Do you know the name of the person who called: PATIENT  What was the call regarding: WANTING TO KNOW IF HER LAB ORDERS WERE IN HER CHART    Do you require a callback: YES

## 2021-07-09 ENCOUNTER — OFFICE VISIT (OUTPATIENT)
Dept: PSYCHIATRY | Facility: CLINIC | Age: 76
End: 2021-07-09

## 2021-07-09 ENCOUNTER — LAB (OUTPATIENT)
Dept: FAMILY MEDICINE CLINIC | Facility: CLINIC | Age: 76
End: 2021-07-09

## 2021-07-09 DIAGNOSIS — F33.1 MODERATE EPISODE OF RECURRENT MAJOR DEPRESSIVE DISORDER (HCC): Primary | ICD-10-CM

## 2021-07-09 PROCEDURE — 90837 PSYTX W PT 60 MINUTES: CPT | Performed by: SOCIAL WORKER

## 2021-07-12 ENCOUNTER — TRANSCRIBE ORDERS (OUTPATIENT)
Dept: HOME HEALTH SERVICES | Facility: HOME HEALTHCARE | Age: 76
End: 2021-07-12

## 2021-07-12 ENCOUNTER — HOME HEALTH ADMISSION (OUTPATIENT)
Dept: HOME HEALTH SERVICES | Facility: HOME HEALTHCARE | Age: 76
End: 2021-07-12

## 2021-07-12 DIAGNOSIS — I11.0 HYPERTENSIVE HEART FAILURE (HCC): Primary | ICD-10-CM

## 2021-07-13 ENCOUNTER — TELEPHONE (OUTPATIENT)
Dept: FAMILY MEDICINE CLINIC | Facility: CLINIC | Age: 76
End: 2021-07-13

## 2021-07-15 DIAGNOSIS — I10 ESSENTIAL HYPERTENSION: ICD-10-CM

## 2021-07-15 DIAGNOSIS — I48.19 ATRIAL FIBRILLATION, PERSISTENT (HCC): ICD-10-CM

## 2021-07-15 RX ORDER — LISINOPRIL 40 MG/1
TABLET ORAL
Qty: 90 TABLET | Refills: 1 | Status: SHIPPED | OUTPATIENT
Start: 2021-07-15 | End: 2022-01-18

## 2021-07-15 RX ORDER — METOPROLOL SUCCINATE 200 MG/1
TABLET, EXTENDED RELEASE ORAL
Qty: 90 TABLET | Refills: 1 | Status: SHIPPED | OUTPATIENT
Start: 2021-07-15 | End: 2022-01-17

## 2021-07-15 NOTE — PROGRESS NOTES
Date: July 9, 2021  Time In: 11:00 am   Time Out: 12:30 pm       PROGRESS NOTE  Data:  Millie Richardson is a 75 y.o. female who presents today for individual therapy session at [facility].     Patient continues to discuss past trauma from her mother. Patient tells me of the circumstances that lead to her marriage when she was 17. Patient states the marriage lasted 3 months due to her mother pushing her to  an older man. She states that prior to the marriage her relationship with another man ended, and this was traumatic for her because she feels this man was the love of her life.       Clinical Maneuvering/Intervention:    Assisted patient in processing above session content; acknowledged and normalized patient’s thoughts, feelings, and concerns.  Rationalized patient thought process regarding depression.  Discussed triggers associated with patient's severe stress.  Also discussed coping skills for patient to implement such as severe stress.    Allowed patient to freely discuss issues without interruption or judgment. Provided safe, confidential environment to facilitate the development of positive therapeutic relationship and encourage open, honest communication. Assisted patient in identifying risk factors which would indicate the need for higher level of care including thoughts to harm self or others and/or self-harming behavior and encouraged patient to contact this office, call 911, or present to the nearest emergency room should any of these events occur. Discussed crisis intervention services and means to access. Patient adamantly and convincingly denies current suicidal or homicidal ideation or perceptual disturbance.    Assessment   Patient appears to maintain relative stability as compared to their baseline.  However, patient continues to struggle with stress which continues to cause impairment in important areas of functioning.  A result, they can be reasonably expected to continue to  benefit from treatment and would likely be at increased risk for decompensation otherwise.    Mental Status Exam:   Hygiene:   good  Cooperation:  Cooperative  Eye Contact:  Poor  Psychomotor Behavior:  Appropriate  Affect:  Full range  Mood: normal  Speech:  Normal  Thought Process:  Goal directed  Thought Content:  Normal  Suicidal:  None  Homicidal:  None  Hallucinations:  None  Delusion:  None  Memory:  Intact  Orientation:  Person, Place, Time and Situation  Reliability:  good  Insight:  Good  Judgement:  Good  Impulse Control:  Good  Physical/Medical Issues:  Yes         Patient's Support Network Includes:  extended family and Patient has very limited support network    Functional Status: Severe impairment    Progress toward goal: Not at goal    Prognosis: Good with Ongoing Treatment          Plan     Patient will continue in individual outpatient therapy with focus on improved functioning and coping skills, maintaining stability, and avoiding decompensation and the need for higher level of care.    Patient will adhere to medication regimen as prescribed and report any side effects. Patient will contact this office, call 911 or present to the nearest emergency room should suicidal or homicidal ideations occur. Provide Cognitive Behavioral Therapy and Solution Focused Therapy to improve functioning, maintain stability, and avoid decompensation and the need for higher level of care.     Return in about 2 weeks, or earlier if symptoms worsen or fail to improve.           VISIT DIAGNOSIS:     ICD-10-CM ICD-9-CM   1. Moderate episode of recurrent major depressive disorder (CMS/[facility])  F33.1 296.32             This document has been electronically signed by Shayla Galloway LCSW  July 15, 2021 08:59 EDT      Part of this note may be an electronic transcription/translation of spoken language to printed text using the Dragon Dictation System.

## 2021-07-20 ENCOUNTER — OFFICE VISIT (OUTPATIENT)
Dept: FAMILY MEDICINE CLINIC | Facility: CLINIC | Age: 76
End: 2021-07-20

## 2021-07-20 VITALS
BODY MASS INDEX: 31.58 KG/M2 | HEART RATE: 72 BPM | SYSTOLIC BLOOD PRESSURE: 130 MMHG | DIASTOLIC BLOOD PRESSURE: 82 MMHG | OXYGEN SATURATION: 97 % | WEIGHT: 208.4 LBS | HEIGHT: 68 IN

## 2021-07-20 DIAGNOSIS — I48.19 ATRIAL FIBRILLATION, PERSISTENT (HCC): Primary | ICD-10-CM

## 2021-07-20 DIAGNOSIS — Z79.01 CHRONIC ANTICOAGULATION: ICD-10-CM

## 2021-07-20 DIAGNOSIS — K21.9 GASTROESOPHAGEAL REFLUX DISEASE WITHOUT ESOPHAGITIS: Chronic | ICD-10-CM

## 2021-07-20 DIAGNOSIS — E03.9 ACQUIRED HYPOTHYROIDISM: ICD-10-CM

## 2021-07-20 DIAGNOSIS — I50.32 CHRONIC DIASTOLIC (CONGESTIVE) HEART FAILURE (HCC): ICD-10-CM

## 2021-07-20 PROCEDURE — 99214 OFFICE O/P EST MOD 30 MIN: CPT | Performed by: FAMILY MEDICINE

## 2021-07-20 NOTE — PROGRESS NOTES
"    Established Patient        Chief Complaint:   Chief Complaint   Patient presents with   • Follow-up Hypothyroidism, A-Fib, GERD and chronic Diastolic CHF             Millie Richardson is a 75 y.o. female    History of Present Illness:   Here today for scheduled follow-up visit of her hypothyroidism, A. fib, GERD and chronic diastolic congestive heart failure.    She denies any chest pain, syncope, vertigo or palpitations.  No fever, chills or night sweats.  She denies any new falls or injuries.  She recently completed home health with PT/OT.  She reports significant improvement to her independence with activities of daily living, as well as generalized conditioning.    She continues to diurese well, maintains a low-sodium/salt dietary intake.  She denies any chest pain or orthopnea at this time.  Denies any aspiration or dysphagia.  No reports of any epistaxis or hemoptysis.  Denies hematuria or BRB/BTS.    Subjective     The following portions of the patient's history were reviewed and updated as appropriate: allergies, current medications, past family history, past medical history, past social history, past surgical history and problem list.    Allergies   Allergen Reactions   • Nitrous Oxide Swelling   • Penicillins Anaphylaxis   • Procaine Nausea Only   • Sulfa Antibiotics Other (See Comments)     Patient states \"it made me want to go to bed, I didn't have a fever but I felt like I did\"        Review of Systems  1. Constitutional: Negative for fever. Negative for chills, diaphoresis.  Chronic malaise/fatigue and without unexpected weight change.   2. HENT: No dysphagia; no changes to vision/hearing/smell/taste; no epistaxis.  Neck pain as per above.  3. Eyes: Negative for redness and visual disturbance.   4. Respiratory: negative for shortness of breath. Negative for chest pain . Negative for cough and chest tightness.   5. Cardiovascular: Negative for chest pain.  Periodic palpitations.  6. Gastrointestinal: " "Negative for abdominal distention, abdominal pain and blood in stool.   7. Endocrine: Negative for cold intolerance and heat intolerance.   8. Genitourinary: Negative for difficulty urinating, dysuria and frequency.   9. Musculoskeletal: Chronic bilateral knee arthralgias, associated cervical back pain and myalgias.  10. Skin: Negative for color change, rash and wound.   11. Neurological: Negative for syncope, weakness and headaches.   12. Hematological: Negative for adenopathy. Does not bruise/bleed easily.   13. Psychiatric/Behavioral: Negative for confusion. The patient is not nervous/anxious.      Objective     Physical Exam   Vital Signs: /82   Pulse 72   Ht 172.7 cm (68\")   Wt 94.5 kg (208 lb 6.4 oz)   LMP  (LMP Unknown)   SpO2 97%   BMI 31.69 kg/m²     General Appearance: alert, oriented x 3, no acute distress.  Pleasant and interactive during questioning and examination.  Skin: warm and dry.   HEENT: Occipital hematoma noted, no laceration or open areas of skin.  pupils round and reactive to light and accommodation, oral mucosa pink and moist.  Nares patent without epistaxis.  External auditory canals are patent tympanic membranes intact.  Poor dentition.  Neck: supple, no JVD, trachea midline.  No thyromegaly.  Spastic paravertebral musculature noted to the cervical spine, more problematic to the right side.  This does lead to impaired sidebending and rotation.  Flexion/extension normal.  Lungs: CTA, unlabored breathing effort.  Heart: RRR, normal S1 and S2, no S3, no rub.  Abdomen: soft, non-tender, no palpable bladder, present bowel sounds to auscultation ×4.  No guarding or rigidity.  Extremities: no clubbing, cyanosis.  Good range of motion actively and passively.  Symmetric muscle strength and development.  Chronic gravity dependent edema noted to the lower extremities, does not extend above the medial tibias bilaterally.  Quadriceps mechanism intact, significant crepitance noted on A/P " range of motion testing.  Medial/lateral joint line tenderness noted to bilateral knees, slightly more pronounced on the left.  Normal dorsiflexion/plantar flexion of bilateral feet.  Patient does have a slightly antalgic gait.  Utilizes Rollator for assistance with ambulation.  Neuro: normal speech and mental status.  Cranial nerves II through XII intact.  No anosmia. DTR 2+; proprioception intact.  No focal motor/sensory deficits.    Advance Care Planning   ACP discussion was held with the patient during this visit. Patient does not have an advance directive, information provided.    Assessment and Plan      Assessment:   Diagnoses and all orders for this visit:    1. Atrial fibrillation, persistent (Primary)    2. Chronic anticoagulation    3. Chronic diastolic (congestive) heart failure (CMS/HCC)    4. Acquired hypothyroidism        Plan:  Very pleased with wt loss since last visit, with effort.    Continue low sodium/salt dietary intake; maintain appropriate hydration.    Continue thyroid supplementation, repeat thyroid labs at f/u appt, will include TSH/FT4/Reverse T3/FT3.    VSS, BP and HR @ goal.    Continue anticoagulation; will need surveillance labs at f/u appt.    Continue PPI; continue dietary/lifestyle mods.      Discussion Summary:    Discussed plan of care in detail with pt today; pt verb understanding and agrees.    I spent 30 minutes caring for Millie on this date of service. This time includes time spent by me in the following activities:preparing for the visit, performing a medically appropriate examination and/or evaluation , counseling and educating the patient/family/caregiver, ordering medications, tests, or procedures, documenting information in the medical record and care coordination    I have reviewed and updated all copied forward information, as appropriate.  I attest to the accuracy and relevance of any unchanged information.    Follow up:  Return in about 3 months (around 10/20/2021) for  Recheck.     There are no Patient Instructions on file for this visit.    Rajan Luis,   07/20/21  15:15 EDT          Please note that portions of this note may have been completed with a voice recognition program. Efforts were made to edit the dictations, but occasionally words are mistranscribed.

## 2021-08-07 DIAGNOSIS — Z79.01 CHRONIC ANTICOAGULATION: ICD-10-CM

## 2021-08-07 DIAGNOSIS — E03.9 ACQUIRED HYPOTHYROIDISM: ICD-10-CM

## 2021-08-09 RX ORDER — LEVOTHYROXINE, LIOTHYRONINE 57; 13.5 UG/1; UG/1
TABLET ORAL
Qty: 90 TABLET | Refills: 0 | Status: SHIPPED | OUTPATIENT
Start: 2021-08-09 | End: 2021-11-19

## 2021-08-09 RX ORDER — APIXABAN 5 MG/1
TABLET, FILM COATED ORAL
Qty: 180 TABLET | Refills: 1 | Status: SHIPPED | OUTPATIENT
Start: 2021-08-09 | End: 2022-02-14

## 2021-08-13 ENCOUNTER — OFFICE VISIT (OUTPATIENT)
Dept: PSYCHIATRY | Facility: CLINIC | Age: 76
End: 2021-08-13

## 2021-08-13 DIAGNOSIS — F33.1 MODERATE EPISODE OF RECURRENT MAJOR DEPRESSIVE DISORDER (HCC): Primary | ICD-10-CM

## 2021-08-13 PROCEDURE — 90837 PSYTX W PT 60 MINUTES: CPT | Performed by: SOCIAL WORKER

## 2021-08-16 DIAGNOSIS — I10 ESSENTIAL HYPERTENSION: ICD-10-CM

## 2021-08-16 NOTE — TELEPHONE ENCOUNTER
Caller: Millie Richardson    Relationship: Self    Best call back number: [phone]      Medication needed:   Requested Prescriptions     Pending Prescriptions Disp Refills   • potassium chloride 10 MEQ CR tablet 30 tablet 3     Sig: Take 1 tablet by mouth Daily.     HUB IS NOT SURE WHICH POTASSIUM PILL PATIENT IS CALLING ABOUT.  SHE ASKED IF THERE WERE TWO LISTED AND THERE IS NOT.  PATIENT WENT TO LOOK FOR BOTTLE BUT CALL DISCONNECTED.  HUB TRIED CALLING PATIENT BACK BUT NO ANSWER.     PLEASE CALL PATIENT AND TRY TO DETERMINE WHICH MEDICATION, SHE IS TRYING TO GET REFILLED.      When do you need the refill by: 08-    What additional details did the patient provide when requesting the medication: SAME AS ABOVE    Does the patient have less than a 3 day supply:  [] Yes  [] No HUB IS NOT SURE     What is the patient's preferred pharmacy: [facility] - [place] - [address]  - [phone] [facility] [phone] FX

## 2021-08-17 RX ORDER — POTASSIUM CHLORIDE 750 MG/1
10 TABLET, FILM COATED, EXTENDED RELEASE ORAL DAILY
Qty: 30 TABLET | Refills: 3 | Status: SHIPPED | OUTPATIENT
Start: 2021-08-17 | End: 2022-02-14

## 2021-08-18 ENCOUNTER — TELEPHONE (OUTPATIENT)
Dept: FAMILY MEDICINE CLINIC | Facility: CLINIC | Age: 76
End: 2021-08-18

## 2021-08-18 NOTE — TELEPHONE ENCOUNTER
I CALLED AND SPOKE WITH [facility], THEY ARE WORKING ON GETTING THE PATIENTS MEDICATION FILLED.    PATIENT HAS BEEN NOTIFIED ABOUT THIS AS WELL

## 2021-08-18 NOTE — TELEPHONE ENCOUNTER
Caller: Millie Richardson    Relationship to patient: Self    Best call back number:[phone]    Patient is needing: PATIENT WAS NEEDING TO SEE ABOUT GETTING PA FOR NP Thyroid 90 MG tablet  MEDICATION TO [facility]    PLEASE ADVISE

## 2021-08-20 ENCOUNTER — TELEPHONE (OUTPATIENT)
Dept: FAMILY MEDICINE CLINIC | Facility: CLINIC | Age: 76
End: 2021-08-20

## 2021-08-20 NOTE — TELEPHONE ENCOUNTER
Caller: MAYUR     Relationship: HEALTH DEPARTMENT     Best call back number: [phone]    What was the call regarding:   MAYUR FROM THE HEALTH DEPARTMENT WOULD LIKE A CALL BACK REGARDING PATIENT NO LONGER HAVING HOME HEALTH AND TO DISCUSS PATIENT'S MEDICATION AND HOW THEY WILL FILLED     Do you require a callback: YES

## 2021-08-24 NOTE — TELEPHONE ENCOUNTER
It looks like according to my notes she was on both the 300 and 150. I haven't seen her since March.

## 2021-08-24 NOTE — TELEPHONE ENCOUNTER
Spoke with Marycarmen, she had been filling Wellbutrin 300mg and Wellbutrin 150mg.  The Wellbutrin 150mg shows that it was dc'd (by you) but according to your note on 03/01/2021 it was refilled.  Marycarmen was not aware of any medication changes and I was a bit confused as well.  Can you clarify if she is still to be taking 300mg with 150mg?

## 2021-08-24 NOTE — PROGRESS NOTES
"Date: August 13, 2021  Time In: 11:00 am   Time Out: 12:00 pm       PROGRESS NOTE  Data:  Millie Richardson is a 75 y.o. female who presents today for individual therapy session at [facility].     Patient discussed with therapist painful past regarding her mother.  Patient states \"all of my problems are from a mother.\"  Patient and therapist reviewed in her child therapeutic technique to assist patient in addressing pain she is still experiencing from her childhood.    Clinical Maneuvering/Intervention:    Assisted patient in processing above session content; acknowledged and normalized patient’s thoughts, feelings, and concerns.  Rationalized patient thought process regarding depression.  Discussed triggers associated with patient's severe stress.  Also discussed coping skills for patient to implement such as severe stress.    Allowed patient to freely discuss issues without interruption or judgment. Provided safe, confidential environment to facilitate the development of positive therapeutic relationship and encourage open, honest communication. Assisted patient in identifying risk factors which would indicate the need for higher level of care including thoughts to harm self or others and/or self-harming behavior and encouraged patient to contact this office, call 911, or present to the nearest emergency room should any of these events occur. Discussed crisis intervention services and means to access. Patient adamantly and convincingly denies current suicidal or homicidal ideation or perceptual disturbance.    Assessment   Patient appears to maintain relative stability as compared to their baseline.  However, patient continues to struggle with stress which continues to cause impairment in important areas of functioning.  A result, they can be reasonably expected to continue to benefit from treatment and would likely be at increased risk for decompensation otherwise.    Mental Status Exam:   Hygiene:   " good  Cooperation:  Cooperative  Eye Contact:  Poor  Psychomotor Behavior:  Appropriate  Affect:  Full range  Mood: normal  Speech:  Normal  Thought Process:  Goal directed  Thought Content:  Normal  Suicidal:  None  Homicidal:  None  Hallucinations:  None  Delusion:  None  Memory:  Intact  Orientation:  Person, Place, Time and Situation  Reliability:  good  Insight:  Good  Judgement:  Good  Impulse Control:  Good  Physical/Medical Issues:  Yes         Patient's Support Network Includes:  extended family and Patient has very limited support network    Functional Status: Severe impairment    Progress toward goal: Not at goal    Prognosis: Good with Ongoing Treatment          Plan     Patient will continue in individual outpatient therapy with focus on improved functioning and coping skills, maintaining stability, and avoiding decompensation and the need for higher level of care.    Patient will adhere to medication regimen as prescribed and report any side effects. Patient will contact this office, call 911 or present to the nearest emergency room should suicidal or homicidal ideations occur. Provide Cognitive Behavioral Therapy and Solution Focused Therapy to improve functioning, maintain stability, and avoid decompensation and the need for higher level of care.     Return in about 2 weeks, or earlier if symptoms worsen or fail to improve.           VISIT DIAGNOSIS:     ICD-10-CM ICD-9-CM   1. Moderate episode of recurrent major depressive disorder (CMS/[facility])  F33.1 296.32             This document has been electronically signed by Shayla Galloway LCSW  August 24, 2021 13:26 EDT      Part of this note may be an electronic transcription/translation of spoken language to printed text using the Dragon Dictation System.

## 2021-08-26 DIAGNOSIS — F33.9 MAJOR DEPRESSION, RECURRENT, CHRONIC (HCC): ICD-10-CM

## 2021-08-26 RX ORDER — BUPROPION HYDROCHLORIDE 150 MG/1
TABLET ORAL
Qty: 30 TABLET | Refills: 2 | OUTPATIENT
Start: 2021-08-26

## 2021-08-31 DIAGNOSIS — F51.04 PSYCHOPHYSIOLOGICAL INSOMNIA: ICD-10-CM

## 2021-08-31 RX ORDER — AMITRIPTYLINE HYDROCHLORIDE 25 MG/1
TABLET, FILM COATED ORAL
Qty: 60 TABLET | Refills: 3 | OUTPATIENT
Start: 2021-08-31

## 2021-09-16 DIAGNOSIS — F33.9 MAJOR DEPRESSION, RECURRENT, CHRONIC (HCC): ICD-10-CM

## 2021-09-16 RX ORDER — BUPROPION HYDROCHLORIDE 150 MG/1
TABLET ORAL
Qty: 30 TABLET | Refills: 2 | OUTPATIENT
Start: 2021-09-16

## 2021-11-19 DIAGNOSIS — E03.9 ACQUIRED HYPOTHYROIDISM: ICD-10-CM

## 2021-11-19 RX ORDER — LEVOTHYROXINE, LIOTHYRONINE 57; 13.5 UG/1; UG/1
TABLET ORAL
Qty: 90 TABLET | Refills: 0 | Status: SHIPPED | OUTPATIENT
Start: 2021-11-19 | End: 2022-02-14

## 2021-11-29 DIAGNOSIS — K21.9 GASTROESOPHAGEAL REFLUX DISEASE WITHOUT ESOPHAGITIS: Chronic | ICD-10-CM

## 2021-11-29 NOTE — TELEPHONE ENCOUNTER
Caller: DylanMillie    Relationship: Self    Best call back number: TRAVEL SCREENING PASSED HUB     Requested Prescriptions:   Requested Prescriptions     Pending Prescriptions Disp Refills   • pantoprazole (PROTONIX) 40 MG EC tablet 90 tablet 1     Sig: Take 1 tablet by mouth Daily.        Pharmacy where request should be sent: [facility] - [place] - [address] [facility] - [phone]  - [phone]     Additional details provided by patient: PATIENT STATED THAT THE PHARMACY NEED A NEW RX STATING THE PATIENT TAKES ON TABLET AT NIGHT- PATIENT MEDS ARE DONE FOR HER AT THE PHARMACY     Does the patient have less than a 3 day supply:  [x] Yes  [] No    Joann Huff Rep   11/29/21 14:41 EST

## 2021-11-30 RX ORDER — PANTOPRAZOLE SODIUM 40 MG/1
40 TABLET, DELAYED RELEASE ORAL DAILY
Qty: 90 TABLET | Refills: 1 | Status: SHIPPED | OUTPATIENT
Start: 2021-11-30 | End: 2022-07-14

## 2021-12-28 ENCOUNTER — TELEPHONE (OUTPATIENT)
Dept: FAMILY MEDICINE CLINIC | Facility: CLINIC | Age: 76
End: 2021-12-28

## 2021-12-28 NOTE — TELEPHONE ENCOUNTER
MAYUR FROM CASE MANAGEMENT CALLED, PATIENT HAS ROLLER WALKER AND THE WHEEL HAS FALLEN OFF. SHE WANTS TO KNOW IF YOU HAVE ANY IDEA WHICH COMPANY IT MAY HAVE COME FROM. MAYUR HAS CALLED ALL OF HER RESOURCES AND DOESN'T FIND WHO BROUGHT IT TO HER.      IF YOU HAVE ANY HELP PLEASE CALL    MAYUR           [phone]

## 2022-01-14 DIAGNOSIS — F33.9 MAJOR DEPRESSION, RECURRENT, CHRONIC: ICD-10-CM

## 2022-01-14 RX ORDER — BUPROPION HYDROCHLORIDE 300 MG/1
TABLET ORAL
Qty: 30 TABLET | Refills: 5 | OUTPATIENT
Start: 2022-01-14

## 2022-01-15 DIAGNOSIS — F33.9 MAJOR DEPRESSION, RECURRENT, CHRONIC: ICD-10-CM

## 2022-01-15 DIAGNOSIS — I48.19 ATRIAL FIBRILLATION, PERSISTENT: ICD-10-CM

## 2022-01-17 DIAGNOSIS — F33.9 MAJOR DEPRESSION, RECURRENT, CHRONIC: ICD-10-CM

## 2022-01-17 RX ORDER — BUPROPION HYDROCHLORIDE 300 MG/1
TABLET ORAL
Qty: 30 TABLET | Refills: 5 | OUTPATIENT
Start: 2022-01-17

## 2022-01-17 RX ORDER — METOPROLOL SUCCINATE 200 MG/1
TABLET, EXTENDED RELEASE ORAL
Qty: 50 TABLET | Refills: 1 | Status: SHIPPED | OUTPATIENT
Start: 2022-01-17 | End: 2022-01-18

## 2022-01-18 DIAGNOSIS — F33.9 MAJOR DEPRESSION, RECURRENT, CHRONIC: ICD-10-CM

## 2022-01-18 DIAGNOSIS — I48.19 ATRIAL FIBRILLATION, PERSISTENT: ICD-10-CM

## 2022-01-18 DIAGNOSIS — I10 ESSENTIAL HYPERTENSION: ICD-10-CM

## 2022-01-18 RX ORDER — BUPROPION HYDROCHLORIDE 300 MG/1
TABLET ORAL
Qty: 30 TABLET | Refills: 5 | Status: SHIPPED | OUTPATIENT
Start: 2022-01-18 | End: 2022-06-24

## 2022-01-18 RX ORDER — LISINOPRIL 40 MG/1
TABLET ORAL
Qty: 90 TABLET | Refills: 1 | Status: SHIPPED | OUTPATIENT
Start: 2022-01-18 | End: 2022-05-24

## 2022-01-18 RX ORDER — METOPROLOL SUCCINATE 200 MG/1
TABLET, EXTENDED RELEASE ORAL
Qty: 50 TABLET | Refills: 1 | Status: SHIPPED | OUTPATIENT
Start: 2022-01-18 | End: 2022-09-16

## 2022-01-18 NOTE — TELEPHONE ENCOUNTER
Patient has not follow up with [facility].  Confirmed with [facility] that patient will be out tomorrow.  Are you willing to fill until patient is able to get in for an appointment?

## 2022-01-26 ENCOUNTER — TELEPHONE (OUTPATIENT)
Dept: FAMILY MEDICINE CLINIC | Facility: CLINIC | Age: 77
End: 2022-01-26

## 2022-01-26 DIAGNOSIS — G90.9 AUTONOMIC INSTABILITY: ICD-10-CM

## 2022-01-26 DIAGNOSIS — Z78.9 IMPAIRED MOBILITY AND ADLS: ICD-10-CM

## 2022-01-26 DIAGNOSIS — G90.9 AUTONOMIC INSTABILITY: Primary | ICD-10-CM

## 2022-01-26 DIAGNOSIS — Z74.09 IMPAIRED MOBILITY AND ADLS: ICD-10-CM

## 2022-01-26 RX ORDER — CALCIUM CARBONATE 160(400)MG
1 TABLET,CHEWABLE ORAL DAILY
Qty: 1 EACH | Refills: 0 | Status: SHIPPED | OUTPATIENT
Start: 2022-01-26

## 2022-01-26 RX ORDER — CALCIUM CARBONATE 160(400)MG
1 TABLET,CHEWABLE ORAL DAILY
Qty: 1 EACH | Refills: 0 | Status: SHIPPED | OUTPATIENT
Start: 2022-01-26 | End: 2022-01-26 | Stop reason: SDUPTHER

## 2022-01-26 NOTE — TELEPHONE ENCOUNTER
Caller: MAYUR DAVENPORT    Best call back number: [phone]    What is the best time to reach you: ANYTIME     Who are you requesting to speak with (clinical staff, provider,  specific staff member):CLINICAL STAFF     What was the call regarding: MAYUR IS CALLING IN REGARDS TO THE PATIENT'S REQUEST FOR A WHEELCHAIR. SHE IS WANTING A ROLATOR.     Do you require a callback: YES

## 2022-01-27 ENCOUNTER — TELEPHONE (OUTPATIENT)
Dept: FAMILY MEDICINE CLINIC | Facility: CLINIC | Age: 77
End: 2022-01-27

## 2022-01-27 NOTE — TELEPHONE ENCOUNTER
Caller: Millie Richardson    Relationship: Self    Best call back number: [phone]    What was the call regarding:   PATIENT WOULD LIKE A CALL BACK REGARDING HER BEING DIZZY AND VERTIGO AND ALMOST FALLING BACKWARDS TODAY. PATIENT WOULD LIKE A CALL BACK REGARDING HER SYMPTOMS     Do you require a callback: YES

## 2022-02-13 DIAGNOSIS — E03.9 ACQUIRED HYPOTHYROIDISM: ICD-10-CM

## 2022-02-13 DIAGNOSIS — Z79.01 CHRONIC ANTICOAGULATION: ICD-10-CM

## 2022-02-13 DIAGNOSIS — I10 ESSENTIAL HYPERTENSION: ICD-10-CM

## 2022-02-14 RX ORDER — APIXABAN 5 MG/1
TABLET, FILM COATED ORAL
Qty: 180 TABLET | Refills: 0 | Status: SHIPPED | OUTPATIENT
Start: 2022-02-14 | End: 2022-05-09

## 2022-02-14 RX ORDER — POTASSIUM CHLORIDE 750 MG/1
TABLET, FILM COATED, EXTENDED RELEASE ORAL
Qty: 30 TABLET | Refills: 0 | Status: SHIPPED | OUTPATIENT
Start: 2022-02-14 | End: 2022-03-17

## 2022-02-14 RX ORDER — LEVOTHYROXINE, LIOTHYRONINE 57; 13.5 UG/1; UG/1
TABLET ORAL
Qty: 90 TABLET | Refills: 0 | Status: SHIPPED | OUTPATIENT
Start: 2022-02-14 | End: 2022-05-09

## 2022-03-02 ENCOUNTER — TELEPHONE (OUTPATIENT)
Dept: FAMILY MEDICINE CLINIC | Facility: CLINIC | Age: 77
End: 2022-03-02

## 2022-03-02 NOTE — TELEPHONE ENCOUNTER
MAYUR-PT NURSE : CALLED STATED THAT PT WAS SENT TO [facility] FOR A FALL, HAS SUTURES PUT ABOVE EYE, WAS SUGGESTED TO REHAB BUT DENIED. PT IS EXPECTED TO COME HOME TODAY.    PLEASE ADVISE.  CALL BACK:[phone]

## 2022-03-03 ENCOUNTER — TELEPHONE (OUTPATIENT)
Dept: FAMILY MEDICINE CLINIC | Facility: CLINIC | Age: 77
End: 2022-03-03

## 2022-03-03 NOTE — TELEPHONE ENCOUNTER
LVM to schedule AWV that is Overdue, looking to schedule w/ MARIA L Burns. Please advise patient and schedule. Thanks.

## 2022-03-04 ENCOUNTER — TELEPHONE (OUTPATIENT)
Dept: FAMILY MEDICINE CLINIC | Facility: CLINIC | Age: 77
End: 2022-03-04

## 2022-03-04 NOTE — TELEPHONE ENCOUNTER
Pt called said the Home Health nurse said her BP was low. The top number is 106 and didn't know bottom number. I tried to make her amirah today but she didn't have transportation. She wants a call back.

## 2022-03-07 ENCOUNTER — OFFICE VISIT (OUTPATIENT)
Dept: FAMILY MEDICINE CLINIC | Facility: CLINIC | Age: 77
End: 2022-03-07

## 2022-03-07 ENCOUNTER — TELEPHONE (OUTPATIENT)
Dept: FAMILY MEDICINE CLINIC | Facility: CLINIC | Age: 77
End: 2022-03-07

## 2022-03-07 VITALS
SYSTOLIC BLOOD PRESSURE: 140 MMHG | RESPIRATION RATE: 16 BRPM | WEIGHT: 197.6 LBS | DIASTOLIC BLOOD PRESSURE: 82 MMHG | OXYGEN SATURATION: 98 % | HEIGHT: 68 IN | HEART RATE: 98 BPM | BODY MASS INDEX: 29.95 KG/M2 | TEMPERATURE: 97.3 F

## 2022-03-07 DIAGNOSIS — I48.19 ATRIAL FIBRILLATION, PERSISTENT: ICD-10-CM

## 2022-03-07 DIAGNOSIS — F33.9 MAJOR DEPRESSION, RECURRENT, CHRONIC: ICD-10-CM

## 2022-03-07 DIAGNOSIS — Z00.00 MEDICARE ANNUAL WELLNESS VISIT, SUBSEQUENT: Primary | ICD-10-CM

## 2022-03-07 DIAGNOSIS — S01.81XS: ICD-10-CM

## 2022-03-07 DIAGNOSIS — R26.9 ABNORMALITY OF GAIT: ICD-10-CM
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
## 2022-03-07 DIAGNOSIS — Z91.81 AT HIGH RISK FOR FALLS: ICD-10-CM

## 2022-03-07 DIAGNOSIS — Z79.01 CHRONIC ANTICOAGULATION: ICD-10-CM

## 2022-03-07 DIAGNOSIS — S01.111S: ICD-10-CM

## 2022-03-07 DIAGNOSIS — R26.2 DIFFICULTY WALKING: ICD-10-CM

## 2022-03-07 DIAGNOSIS — I50.32 CHRONIC DIASTOLIC (CONGESTIVE) HEART FAILURE: ICD-10-CM

## 2022-03-07 PROBLEM — H52.13 MYOPIA OF BOTH EYES: Status: ACTIVE | Noted: 2021-07-06

## 2022-03-07 PROBLEM — H35.051 CHOROIDAL NEOVASCULAR MEMBRANE OF RIGHT EYE: Status: ACTIVE | Noted: 2021-07-06

## 2022-03-07 PROBLEM — H16.223 KERATOCONJUNCTIVITIS SICCA OF BOTH EYES NOT SPECIFIED AS SJOGREN'S: Status: ACTIVE | Noted: 2021-07-06

## 2022-03-07 PROBLEM — S01.81XA LACERATION OF FOREHEAD: Status: ACTIVE | Noted: 2022-03-01

## 2022-03-07 PROBLEM — H25.813 COMBINED FORMS OF AGE-RELATED CATARACT OF BOTH EYES: Status: ACTIVE | Noted: 2021-07-06

## 2022-03-07 PROCEDURE — 1170F FXNL STATUS ASSESSED: CPT | Performed by: FAMILY MEDICINE

## 2022-03-07 PROCEDURE — G0439 PPPS, SUBSEQ VISIT: HCPCS | Performed by: FAMILY MEDICINE

## 2022-03-07 PROCEDURE — 99214 OFFICE O/P EST MOD 30 MIN: CPT | Performed by: FAMILY MEDICINE

## 2022-03-07 PROCEDURE — 1160F RVW MEDS BY RX/DR IN RCRD: CPT | Performed by: FAMILY MEDICINE

## 2022-03-07 NOTE — PATIENT INSTRUCTIONS
Advance Care Planning and Advance Directives     You make decisions on a daily basis - decisions about where you want to live, your career, your home, your life. Perhaps one of the most important decisions you face is your choice for future medical care. Take time to talk with your family and your healthcare team and start planning today.  Advance Care Planning is a process that can help you:  · Understand possible future healthcare decisions in light of your own experiences  · Reflect on those decision in light of your goals and values  · Discuss your decisions with those closest to you and the healthcare professionals that care for you  · Make a plan by creating a document that reflects your wishes    Surrogate Decision Maker  In the event of a medical emergency, which has left you unable to communicate or to make your own decisions, you would need someone to make decisions for you.  It is important to discuss your preferences for medical treatment with this person while you are in good health.     Qualities of a surrogate decision maker:  • Willing to take on this role and responsibility  • Knows what you want for future medical care  • Willing to follow your wishes even if they don't agree with them  • Able to make difficult medical decisions under stressful circumstances    Advance Directives  These are legal documents you can create that will guide your healthcare team and decision maker(s) when needed. These documents can be stored in the electronic medical record.    · Living Will - a legal document to guide your care if you have a terminal condition or a serious illness and are unable to communicate. States vary by statute in document names/types, but most forms may include one or more of the following:        -  Directions regarding life-prolonging treatments        -  Directions regarding artificially provided nutrition/hydration        -  Choosing a healthcare decision maker        -  Direction  regarding organ/tissue donation    · Durable Power of  for Healthcare - this document names an -in-fact to make medical decisions for you, but it may also allow this person to make personal and financial decisions for you. Please seek the advice of an  if you need this type of document.    **Advance Directives are not required and no one may discriminate against you if you do not sign one.    Medical Orders  Many states allow specific forms/orders signed by your physician to record your wishes for medical treatment in your current state of health. This form, signed in personal communication with your physician, addresses resuscitation and other medical interventions that you may or may not want.      For more information or to schedule a time with a [facility] Advance Care Planning Facilitator contact: [facility] or call [phone] and someone will contact you directly.    Medicare Wellness  Personal Prevention Plan of Service     Date of Office Visit:    Encounter Provider:  Rajan Luis DO  Place of Service:  [facility]  Patient Name: Millie Richardson  :  1945    As part of the Medicare Wellness portion of your visit today, we are providing you with this personalized preventive plan of services (PPPS). This plan is based upon recommendations of the [place] Preventive Services Task Force (USPSTF) and the Advisory Committee on Immunization Practices (ACIP).    This lists the preventive care services that should be considered, and provides dates of when you are due. Items listed as completed are up-to-date and do not require any further intervention.    Health Maintenance   Topic Date Due   • DXA SCAN  Never done   • ZOSTER VACCINE (1 of 2) Never done   • COLORECTAL CANCER SCREENING  2015   • MAMMOGRAM  2019   • COVID-19 Vaccine (3 - Booster for Moderna series) 10/06/2021   • ANNUAL WELLNESS VISIT  2021   •  INFLUENZA VACCINE  03/07/2023 (Originally 8/1/2021)   • TDAP/TD VACCINES (3 - Td or Tdap) 08/06/2028   • HEPATITIS C SCREENING  Completed   • Pneumococcal Vaccine 65+  Completed       No orders of the defined types were placed in this encounter.      No follow-ups on file.

## 2022-03-07 NOTE — TELEPHONE ENCOUNTER
Caller: shadi   Relationship:     Best call back number: [phone]    What orders are you requesting (i.e. lab or imaging): life alert      In what timeframe would the patient need to come in:     Where will you receive your lab/imaging services:     Additional notes:

## 2022-03-07 NOTE — PROGRESS NOTES
The ABCs of the Annual Wellness Visit  Subsequent Medicare Wellness Visit    Chief Complaint   Patient presents with   • Follow-up      Subjective    History of Present Illness:  Millie Richardson is a 76 y.o. female who presents for a Subsequent Medicare Wellness Visit.    Patient also here today in follow-up from recent mechanical/accidental fall at home, has only been utilizing a walker for support, has a Rollator his malfunction, unable to be used as a result.  Her insurance has denied coverage of a new Rollator.  Patient reports a mechanical fall which resulted in a right forehead/eyebrow laceration, requiring emergency room evaluation and subsequent suture closure.  She has chromic dissolvable sutures to close the area.  Denies any streaking erythema or any discharge.      The following portions of the patient's history were reviewed and   updated as appropriate: allergies, current medications, past family history, past medical history, past social history, past surgical history and problem list.    Compared to one year ago, the patient feels her physical   health is the same.    Compared to one year ago, the patient feels her mental   health is the same.    Recent Hospitalizations:  She was not admitted to the hospital during the last year.       Current Medical Providers:  Patient Care Team:  Rajan Luis DO as PCP - General (Family Medicine)  Martínez Cintron MD as Consulting Physician (Cardiology)  Sheeba Shepherd MD as Consulting Physician (Pulmonary Disease)  Rebekah Crowe MD as Consulting Physician (Obstetrics and Gynecology)  Dana Espinoza APRN as Nurse Practitioner (Family Medicine)    Outpatient Medications Prior to Visit   Medication Sig Dispense Refill   • amitriptyline (ELAVIL) 25 MG tablet 1-2 po q hs 60 tablet 3   • amLODIPine (NORVASC) 5 MG tablet Take 1 tablet by mouth Daily. 90 tablet 3   • buPROPion XL (WELLBUTRIN XL) 300 MG 24 hr tablet TAKE ONE TABLET BY MOUTH DAILY 30 tablet 5    • Diclofenac Sodium (VOLTAREN) 1 % gel gel Apply 4 g topically to the appropriate area as directed 4 (Four) Times a Day As Needed (pain). 100 g 2   • lisinopril (PRINIVIL,ZESTRIL) 40 MG tablet TAKE ONE TABLET BY MOUTH ONCE DAILY 90 tablet 1   • metoprolol succinate XL (TOPROL-XL) 200 MG 24 hr tablet TAKE ONE TABLET BY MOUTH ONCE DAILY 50 tablet 1   • Misc. Devices (Bath/Shower Seat) misc 1 Units As Needed (bathing). 1 each 0   • Misc. Devices (Rollator Ultra-Light) misc 1 Units Daily. 1 each 0   • NP Thyroid 90 MG tablet TAKE ONE TABLET BY MOUTH ONCE DAILY 90 tablet 0   • pantoprazole (PROTONIX) 40 MG EC tablet Take 1 tablet by mouth Daily. 90 tablet 1   • potassium chloride 10 MEQ CR tablet TAKE ONE TABLET BY MOUTH ONCE DAILY 30 tablet 0   • traMADol (ULTRAM) 50 MG tablet Take 1 tablet by mouth Every 8 (Eight) Hours As Needed for Severe Pain . 12 tablet 0   • Eliquis 5 MG tablet tablet TAKE ONE TABLET BY MOUTH TWICE DAILY 180 tablet 0   • venlafaxine (EFFEXOR) 100 MG tablet Take 1 tablet by mouth 2 (Two) Times a Day. 180 tablet 1     No facility-administered medications prior to visit.       Opioid medication/s are on active medication list.  and I have evaluated her active treatment plan and pain score trends (see table).  There were no vitals filed for this visit.  I have reviewed the chart for potential of high risk medication and harmful drug interactions in the elderly.            Aspirin is not on active medication list.  Aspirin use is contraindicated for this patient due to: current use of Eliquis.  .    Patient Active Problem List   Diagnosis   • Atrial fibrillation, persistent   • Essential hypertension   • History of myocardial infarction   • Acquired hypothyroidism   • Depression   • Blepharospasm   • Chronic diastolic (congestive) heart failure (HCC)   • Pulmonary hypertension (HCC)   • Gastroesophageal reflux disease without esophagitis   • Major depression, recurrent, chronic (HCC)   • Generalized  anxiety disorder   • Chronic anticoagulation   • Age-related osteoporosis without current pathological fracture   • Moderate mitral regurgitation   • Moderate tricuspid regurgitation   • Cervical radiculopathy due to degenerative joint disease of spine   • Tricompartment osteoarthritis of both knees   • Abnormal x-ray of femur   • Spastic torticollis   • Vertigo   • Permanent atrial fibrillation (HCC)   • Autonomic instability   • Impaired mobility and ADLs   • Choroidal neovascular membrane of right eye   • Combined forms of age-related cataract of both eyes   • Keratoconjunctivitis sicca of both eyes not specified as Sjogren's   • Laceration of forehead   • Myopia of both eyes     Advance Care Planning  Advance Directive is not on file.  ACP discussion was held with the patient during this visit. Patient does not have an advance directive, information provided.    Review of Systems  1. Constitutional: Negative for fever. Negative for chills, diaphoresis.  Chronic malaise/fatigue and without unexpected weight change.   2. HENT: No dysphagia; no changes to vision/hearing/smell/taste; no epistaxis.  Neck pain as per above.  3. Eyes: Negative for redness and visual disturbance.   4. Respiratory: negative for shortness of breath. Negative for chest pain . Negative for cough and chest tightness.   5. Cardiovascular: Negative for chest pain.  Periodic palpitations.  6. Gastrointestinal: Negative for abdominal distention, abdominal pain and blood in stool.   7. Endocrine: Negative for cold intolerance and heat intolerance.   8. Genitourinary: Negative for difficulty urinating, dysuria and frequency.   9. Musculoskeletal: Chronic bilateral knee arthralgias, associated cervical back pain and myalgias.  10. Skin: Laceration as per above.  11. Neurological: Negative for syncope, weakness and headaches.   12. Hematological: Negative for adenopathy. Does not bruise/bleed easily.   13. Psychiatric/Behavioral: Negative for  "confusion. The patient is not nervous/anxious.            Objective    Vitals:    03/07/22 1341   BP: 140/82   Pulse: 98   Resp: 16   Temp: 97.3 °F (36.3 °C)   SpO2: 98%   Weight: 89.6 kg (197 lb 9.6 oz)   Height: 172.7 cm (68\")     BMI Readings from Last 1 Encounters:   03/07/22 30.04 kg/m²   BMI is above normal parameters. Recommendations include: educational material, exercise counseling and nutrition counseling    Does the patient have evidence of cognitive impairment? No    Physical Exam        General Appearance: alert, oriented x 3, no acute distress.  Pleasant and interactive during questioning and examination.  Skin: warm and dry.  Laceration, well approximated wound edges and suture closure noted, wound approximately 5 cm in longest axis.  Without streaking erythema, no appreciable discharge.  There is noted ecchymosis around the right orbit, consistent with gravity dependent nature.  HEENT: Occipital hematoma noted, no laceration or open areas of skin.  pupils round and reactive to light and accommodation, oral mucosa pink and moist.  Nares patent without epistaxis.  External auditory canals are patent tympanic membranes intact.  Poor dentition.  Neck: supple, no JVD, trachea midline.  No thyromegaly.  Spastic paravertebral musculature noted to the cervical spine, more problematic to the right side.  This does lead to impaired sidebending and rotation.  Flexion/extension normal.  Lungs: CTA, unlabored breathing effort.  Heart: RRR, normal S1 and S2, no S3, no rub.  Abdomen: soft, non-tender, no palpable bladder, present bowel sounds to auscultation ×4.  No guarding or rigidity.  Extremities: no clubbing, cyanosis.  Good range of motion actively and passively.  Symmetric muscle strength and development.  Chronic gravity dependent edema noted to the lower extremities, does not extend above the medial tibias bilaterally.  Quadriceps mechanism intact, significant crepitance noted on A/P range of motion " testing.  Medial/lateral joint line tenderness noted to bilateral knees, slightly more pronounced on the left.  Normal dorsiflexion/plantar flexion of bilateral feet.  Patient does have a slightly antalgic gait.  Utilizes Rollator for assistance with ambulation.  Ambulates with a distance  Neuro: normal speech and mental status.  Cranial nerves II through XII intact.  No anosmia. DTR 2+; proprioception intact.  No focal motor/sensory deficits.    HEALTH RISK ASSESSMENT    Smoking Status:  Social History     Tobacco Use   Smoking Status Never Smoker   Smokeless Tobacco Never Used     Alcohol Consumption:  Social History     Substance and Sexual Activity   Alcohol Use No     Fall Risk Screen:    [facility], and patient is at HIGH risk for falls. Assessment completed on:3/7/2022    Depression Screening:  PHQ-2/PHQ-9 Depression Screening 3/7/2022   Little Interest or Pleasure in Doing Things 1-->several days   Feeling Down, Depressed or Hopeless 1-->several days   Trouble Falling or Staying Asleep, or Sleeping Too Much 1-->several days   Feeling Tired or Having Little Energy 3-->nearly every day   Poor Appetite or Overeating 1-->several days   Feeling Bad about Yourself - or that You are a Failure or Have Let Yourself or Your Family Down 0-->not at all   Trouble Concentrating on Things, Such as Reading the Newspaper or Watching Television 1-->several days   Moving or Speaking So Slowly that Other People Could Have Noticed? Or the Opposite - Being So Fidgety 3-->nearly every day   Thoughts that You Would be Better Off Dead or of Hurting Yourself in Some Way 0-->not at all   PHQ-9: Brief Depression Severity Measure Score 11   If You Checked Off Any Problems, How Difficult Have These Problems Made It For You to Do Your Work, Take Care of Things at Home, or Get Along with Other People? very difficult       Health Habits and Functional and Cognitive Screening:  No flowsheet data  found.    Age-appropriate Screening Schedule:  Refer to the list below for future screening recommendations based on patient's age, sex and/or medical conditions. Orders for these recommended tests are listed in the plan section. The patient has been provided with a written plan.    Health Maintenance   Topic Date Due   • DXA SCAN  Never done   • ZOSTER VACCINE (1 of 2) Never done   • MAMMOGRAM  09/14/2019   • INFLUENZA VACCINE  03/07/2023 (Originally 8/1/2021)   • TDAP/TD VACCINES (3 - Td or Tdap) 08/06/2028              Assessment/Plan   CMS Preventative Services Quick Reference  Risk Factors Identified During Encounter  Cardiovascular Disease  Fall Risk-High or Moderate  Inactivity/Sedentary  The above risks/problems have been discussed with the patient.  Follow up actions/plans if indicated are seen below in the Assessment/Plan Section.  Pertinent information has been shared with the patient in the After Visit Summary.    Diagnoses and all orders for this visit:    1. Medicare annual wellness visit, subsequent (Primary)    2. Injury due to fall, sequela    3. Chronic anticoagulation    4. Laceration of eyebrow and forehead, right, sequela    5. Atrial fibrillation, persistent    6. Chronic diastolic (congestive) heart failure (HCC)    7. Major depression, recurrent, chronic (HCC)    8. At high risk for falls    9. Difficulty walking    10. Abnormality of gait        Follow Up:   Return in about 4 months (around 7/7/2022) for Recheck.     An After Visit Summary and PPPS were made available to the patient.    Pt defers all forms of colorectal cancer screening.    Discussed need for stress/anxiety reducing techniques such as prayer/meditation/breathing and counting exercises and avoidance of stress producing environments/situations; will follow clinically.    Pt is hopeful to start outpt counseling soon; [employer]  provider does not take her insurance.    Needs new rollator, as her current one is broken; her  insurance has denied coverage d/t 5 yr expected usage; will seek waiver for coverage given her propensity for falls/injuries d/t autonomic instability and generalized impairment to mob and ADLs.    I spent 55 minutes caring for Millie on this date of service; this includes 25 minutes spent in management of her Medicare wellness issues/concerns, as well as an additional 30 minutes spent in management of injury sustained from a mechanical/accidental fall, chronic anticoagulation, atrial fibrillation, chronic diastolic congestive heart failure, major depression, abnormal gait and impairment to mobility. This time includes time spent by me in the following.

## 2022-03-08 ENCOUNTER — TELEPHONE (OUTPATIENT)
Dept: FAMILY MEDICINE CLINIC | Facility: CLINIC | Age: 77
End: 2022-03-08

## 2022-03-08 NOTE — TELEPHONE ENCOUNTER
Called [facility] about broken rolator for patient they stated they would call patient and schedule a time to come out and see about repair for a fee 25.00 called patient informed her they would be calling her ,and they are suppose to call me back inform me of out come

## 2022-03-17 ENCOUNTER — TELEPHONE (OUTPATIENT)
Dept: FAMILY MEDICINE CLINIC | Facility: CLINIC | Age: 77
End: 2022-03-17

## 2022-03-17 DIAGNOSIS — I10 ESSENTIAL HYPERTENSION: ICD-10-CM

## 2022-03-17 RX ORDER — POTASSIUM CHLORIDE 750 MG/1
TABLET, FILM COATED, EXTENDED RELEASE ORAL
Qty: 30 TABLET | Refills: 0 | Status: SHIPPED | OUTPATIENT
Start: 2022-03-17 | End: 2022-05-17

## 2022-03-17 NOTE — TELEPHONE ENCOUNTER
Caller: FLORENTINO    Relationship: [facility]     Best call back number: [phone]    What orders are you requesting (i.e. lab or imaging): VERBAL ORDER FOR A UA C&S    In what timeframe would the patient need to come in: AS SOON AS POSSIBLE    Additional notes: FLORENTINO STATED THE PATIENT IS COMPLAINING OF BURNING WITH URINATION

## 2022-03-18 NOTE — TELEPHONE ENCOUNTER
Attempted to contact chantelle. There was no answer, left message for chantelle to contact the clinic.

## 2022-03-22 RX ORDER — AMLODIPINE BESYLATE 5 MG/1
TABLET ORAL
Qty: 90 TABLET | Refills: 3 | Status: SHIPPED | OUTPATIENT
Start: 2022-03-22 | End: 2023-03-10

## 2022-03-30 DIAGNOSIS — F51.04 PSYCHOPHYSIOLOGICAL INSOMNIA: ICD-10-CM

## 2022-03-30 RX ORDER — AMITRIPTYLINE HYDROCHLORIDE 25 MG/1
TABLET, FILM COATED ORAL
Qty: 60 TABLET | Refills: 3 | OUTPATIENT
Start: 2022-03-30

## 2022-03-31 ENCOUNTER — TELEPHONE (OUTPATIENT)
Dept: FAMILY MEDICINE CLINIC | Facility: CLINIC | Age: 77
End: 2022-03-31

## 2022-04-01 NOTE — TELEPHONE ENCOUNTER
PATIENT IS CALLING TO CHECK ON THE MEDICATION REQUEST. PATIENT WOULD LIKE TO BE NOTIFIED WHEN THE MEDICATION HAS BEEN CALLED IN. SHE SAYS THAT HER SYMPTOMS ARE GETTING INCREASINGLY UNCOMFORTABLE.

## 2022-04-04 ENCOUNTER — TELEPHONE (OUTPATIENT)
Dept: FAMILY MEDICINE CLINIC | Facility: CLINIC | Age: 77
End: 2022-04-04

## 2022-04-05 ENCOUNTER — TELEPHONE (OUTPATIENT)
Dept: FAMILY MEDICINE CLINIC | Facility: CLINIC | Age: 77
End: 2022-04-05

## 2022-04-06 RX ORDER — AZITHROMYCIN 250 MG/1
TABLET, FILM COATED ORAL
Qty: 6 TABLET | Refills: 0 | Status: SHIPPED | OUTPATIENT
Start: 2022-04-06 | End: 2022-10-18

## 2022-04-13 ENCOUNTER — TELEPHONE (OUTPATIENT)
Dept: FAMILY MEDICINE CLINIC | Facility: CLINIC | Age: 77
End: 2022-04-13

## 2022-04-13 RX ORDER — DOXYCYCLINE HYCLATE 100 MG/1
100 CAPSULE ORAL 2 TIMES DAILY
Qty: 20 CAPSULE | Refills: 0 | Status: SHIPPED | OUTPATIENT
Start: 2022-04-13 | End: 2022-04-23

## 2022-04-13 NOTE — TELEPHONE ENCOUNTER
FLORENTINO WITH [facility] HAS CALLED AND STATED PATIENT IS DONE WITH ANTIBIOTICS AND IS STILL HAVING BURNING WHEN URINATING.    NURSE, FLORENTINO, WITH [facility] IS REQUESTING A CALL BACK ASAP TO BE ADVISED ON NEXT STEPS IN PLAN OF CARE.    CALL BACK NUMBER [phone]

## 2022-04-18 DIAGNOSIS — F51.04 PSYCHOPHYSIOLOGICAL INSOMNIA: ICD-10-CM

## 2022-04-18 RX ORDER — AMITRIPTYLINE HYDROCHLORIDE 25 MG/1
TABLET, FILM COATED ORAL
Qty: 60 TABLET | Refills: 3 | OUTPATIENT
Start: 2022-04-18

## 2022-04-25 ENCOUNTER — TELEPHONE (OUTPATIENT)
Dept: FAMILY MEDICINE CLINIC | Facility: CLINIC | Age: 77
End: 2022-04-25

## 2022-05-02 ENCOUNTER — TELEPHONE (OUTPATIENT)
Dept: FAMILY MEDICINE CLINIC | Facility: CLINIC | Age: 77
End: 2022-05-02

## 2022-05-02 NOTE — TELEPHONE ENCOUNTER
NALLELY FROM [facility] CALLED TO LET YOU KNOW THAT THEY ARE READMITTING PATIENT TOMORROW (TUEARNEST. 05/03/22) FOR CARE.    HER CERTIFICATION RAN OUT TODAY BUT I'M NOT IN OFFICE SO IT WILL BE DONE TOMORROW.  JUST FOR YOUR INFORMATION.    NALLELY    [phone]

## 2022-05-03 ENCOUNTER — TELEPHONE (OUTPATIENT)
Dept: FAMILY MEDICINE CLINIC | Facility: CLINIC | Age: 77
End: 2022-05-03

## 2022-05-03 NOTE — TELEPHONE ENCOUNTER
Cecilia from [facility] called needing a new(up to date) order sent in for this patient. Attempted to fax last visit note to [phone]. Could not get fax to send through

## 2022-05-04 DIAGNOSIS — F33.9 MAJOR DEPRESSION, RECURRENT, CHRONIC: ICD-10-CM

## 2022-05-04 DIAGNOSIS — F41.1 GENERALIZED ANXIETY DISORDER: ICD-10-CM

## 2022-05-04 DIAGNOSIS — F51.04 PSYCHOPHYSIOLOGICAL INSOMNIA: ICD-10-CM

## 2022-05-04 RX ORDER — VENLAFAXINE 100 MG/1
TABLET ORAL
Qty: 180 TABLET | Refills: 1 | Status: SHIPPED | OUTPATIENT
Start: 2022-05-04 | End: 2022-12-01

## 2022-05-05 RX ORDER — AMITRIPTYLINE HYDROCHLORIDE 25 MG/1
TABLET, FILM COATED ORAL
Qty: 60 TABLET | Refills: 3 | OUTPATIENT
Start: 2022-05-05

## 2022-05-05 RX ORDER — AMITRIPTYLINE HYDROCHLORIDE 25 MG/1
TABLET, FILM COATED ORAL
Qty: 60 TABLET | Refills: 3 | Status: SHIPPED | OUTPATIENT
Start: 2022-05-05 | End: 2022-09-16

## 2022-05-08 DIAGNOSIS — Z79.01 CHRONIC ANTICOAGULATION: ICD-10-CM

## 2022-05-08 DIAGNOSIS — E03.9 ACQUIRED HYPOTHYROIDISM: ICD-10-CM

## 2022-05-09 RX ORDER — APIXABAN 5 MG/1
TABLET, FILM COATED ORAL
Qty: 180 TABLET | Refills: 0 | Status: SHIPPED | OUTPATIENT
Start: 2022-05-09 | End: 2022-07-19

## 2022-05-09 RX ORDER — LEVOTHYROXINE, LIOTHYRONINE 57; 13.5 UG/1; UG/1
TABLET ORAL
Qty: 90 TABLET | Refills: 0 | Status: SHIPPED | OUTPATIENT
Start: 2022-05-09 | End: 2022-07-19

## 2022-05-11 ENCOUNTER — TELEPHONE (OUTPATIENT)
Dept: FAMILY MEDICINE CLINIC | Facility: CLINIC | Age: 77
End: 2022-05-11

## 2022-05-11 NOTE — TELEPHONE ENCOUNTER
Caller: MAYUR    Relationship: Other    Best call back number: [phone]    What is the best time to reach you: ANY TIME     Who are you requesting to speak with (clinical staff, provider,  specific staff member): DR. COLLIER OR HIS NURSE     Do you know the name of the person who called:     What was the call regarding: MAYUR WITH [facility] () PATIENT MISSED HER RE-CERTIFICATION FOR [facility] AND THEY CLOSED HER ORDER.  [facility] IS NEEDING A NEW REFERRAL SO THEY CAN CONTINUE SEEING THE PATIENT.    PATIENT STATED SHE HAS BEEN DIZZY AND SHE STILL NEEDS HOME HEALTH SERVICES       Do you require a callback: YES

## 2022-05-17 DIAGNOSIS — I10 ESSENTIAL HYPERTENSION: ICD-10-CM

## 2022-05-17 RX ORDER — POTASSIUM CHLORIDE 750 MG/1
TABLET, FILM COATED, EXTENDED RELEASE ORAL
Qty: 30 TABLET | Refills: 0 | Status: SHIPPED | OUTPATIENT
Start: 2022-05-17 | End: 2022-06-24

## 2022-05-24 DIAGNOSIS — I10 ESSENTIAL HYPERTENSION: ICD-10-CM

## 2022-05-24 RX ORDER — LISINOPRIL 40 MG/1
TABLET ORAL
Qty: 90 TABLET | Refills: 1 | Status: SHIPPED | OUTPATIENT
Start: 2022-05-24 | End: 2022-12-27

## 2022-05-27 ENCOUNTER — TELEPHONE (OUTPATIENT)
Dept: FAMILY MEDICINE CLINIC | Facility: CLINIC | Age: 77
End: 2022-05-27

## 2022-05-27 NOTE — TELEPHONE ENCOUNTER
Caller: Millie Richardson    Relationship: Self    Best call back number: [phone]    What is the best time to reach you: LATE MORNING OR AFTERNOON    Who are you requesting to speak with (clinical staff, provider,  specific staff member): CLINICAL STAFF    Do you know the name of the person who called:     What was the call regarding: STILL EXPERIENCING SYMPTOMS OF UTI    Do you require a callback: YES

## 2022-06-06 ENCOUNTER — TELEPHONE (OUTPATIENT)
Dept: FAMILY MEDICINE CLINIC | Facility: CLINIC | Age: 77
End: 2022-06-06

## 2022-06-06 NOTE — TELEPHONE ENCOUNTER
Caller: Millie Richardson    Relationship: Self    Best call back number: [phone]    What is the best time to reach you: ANYTIME    Who are you requesting to speak with (clinical staff, provider,  specific staff member): PROVIDER    Do you know the name of the person who called: SELF    What was the call regarding: PATIENT STATES THAT SHE WOULD LIKE A CALL ABOUT A MEDICATION CALLED RISPERIDONE AND ELIQUIS 5MG.    Do you require a callback: YES

## 2022-06-24 DIAGNOSIS — F33.9 MAJOR DEPRESSION, RECURRENT, CHRONIC: ICD-10-CM

## 2022-06-24 DIAGNOSIS — I10 ESSENTIAL HYPERTENSION: ICD-10-CM

## 2022-06-24 RX ORDER — BUPROPION HYDROCHLORIDE 300 MG/1
TABLET ORAL
Qty: 30 TABLET | Refills: 0 | Status: SHIPPED | OUTPATIENT
Start: 2022-06-24 | End: 2022-07-19

## 2022-06-24 RX ORDER — POTASSIUM CHLORIDE 750 MG/1
TABLET, FILM COATED, EXTENDED RELEASE ORAL
Qty: 30 TABLET | Refills: 0 | Status: SHIPPED | OUTPATIENT
Start: 2022-06-24 | End: 2022-07-19

## 2022-07-14 DIAGNOSIS — K21.9 GASTROESOPHAGEAL REFLUX DISEASE WITHOUT ESOPHAGITIS: Chronic | ICD-10-CM

## 2022-07-14 RX ORDER — PANTOPRAZOLE SODIUM 40 MG/1
TABLET, DELAYED RELEASE ORAL
Qty: 90 TABLET | Refills: 1 | Status: SHIPPED | OUTPATIENT
Start: 2022-07-14 | End: 2022-12-27

## 2022-07-19 DIAGNOSIS — Z79.01 CHRONIC ANTICOAGULATION: ICD-10-CM

## 2022-07-19 DIAGNOSIS — E03.9 ACQUIRED HYPOTHYROIDISM: ICD-10-CM

## 2022-07-19 DIAGNOSIS — F33.9 MAJOR DEPRESSION, RECURRENT, CHRONIC: ICD-10-CM

## 2022-07-19 DIAGNOSIS — I10 ESSENTIAL HYPERTENSION: ICD-10-CM

## 2022-07-19 RX ORDER — BUPROPION HYDROCHLORIDE 300 MG/1
TABLET ORAL
Qty: 30 TABLET | Refills: 0 | Status: SHIPPED | OUTPATIENT
Start: 2022-07-19 | End: 2022-09-16

## 2022-07-19 RX ORDER — APIXABAN 5 MG/1
TABLET, FILM COATED ORAL
Qty: 180 TABLET | Refills: 0 | Status: SHIPPED | OUTPATIENT
Start: 2022-07-19 | End: 2022-10-11

## 2022-07-19 RX ORDER — LEVOTHYROXINE, LIOTHYRONINE 57; 13.5 UG/1; UG/1
TABLET ORAL
Qty: 90 TABLET | Refills: 0 | Status: SHIPPED | OUTPATIENT
Start: 2022-07-19 | End: 2022-12-15

## 2022-07-19 RX ORDER — POTASSIUM CHLORIDE 750 MG/1
TABLET, FILM COATED, EXTENDED RELEASE ORAL
Qty: 30 TABLET | Refills: 0 | Status: SHIPPED | OUTPATIENT
Start: 2022-07-19 | End: 2022-08-23

## 2022-08-02 ENCOUNTER — OFFICE VISIT (OUTPATIENT)
Dept: FAMILY MEDICINE CLINIC | Facility: CLINIC | Age: 77
End: 2022-08-02

## 2022-08-02 VITALS
RESPIRATION RATE: 15 BRPM | TEMPERATURE: 97.8 F | SYSTOLIC BLOOD PRESSURE: 146 MMHG | WEIGHT: 192.2 LBS | DIASTOLIC BLOOD PRESSURE: 76 MMHG | OXYGEN SATURATION: 97 % | HEIGHT: 68 IN | BODY MASS INDEX: 29.13 KG/M2 | HEART RATE: 68 BPM

## 2022-08-02 DIAGNOSIS — I07.1 MODERATE TRICUSPID REGURGITATION: ICD-10-CM

## 2022-08-02 DIAGNOSIS — E03.9 ACQUIRED HYPOTHYROIDISM: ICD-10-CM

## 2022-08-02 DIAGNOSIS — Z78.9 IMPAIRED MOBILITY AND ADLS: ICD-10-CM

## 2022-08-02 DIAGNOSIS — N39.0 RECURRENT UTI: ICD-10-CM

## 2022-08-02 DIAGNOSIS — Z01.818 PREOPERATIVE EVALUATION TO RULE OUT SURGICAL CONTRAINDICATION: ICD-10-CM

## 2022-08-02 DIAGNOSIS — R30.0 DYSURIA: ICD-10-CM

## 2022-08-02 DIAGNOSIS — Z79.01 CHRONIC ANTICOAGULATION: ICD-10-CM

## 2022-08-02 DIAGNOSIS — I50.32 CHRONIC DIASTOLIC (CONGESTIVE) HEART FAILURE: Primary | ICD-10-CM

## 2022-08-02 DIAGNOSIS — H25.813 COMBINED FORMS OF AGE-RELATED CATARACT OF BOTH EYES: ICD-10-CM

## 2022-08-02 DIAGNOSIS — I34.0 MODERATE MITRAL REGURGITATION: ICD-10-CM

## 2022-08-02 DIAGNOSIS — Z74.09 IMPAIRED MOBILITY AND ADLS: ICD-10-CM

## 2022-08-02 DIAGNOSIS — E53.8 VITAMIN B12 DEFICIENCY: ICD-10-CM

## 2022-08-02 PROCEDURE — 99215 OFFICE O/P EST HI 40 MIN: CPT | Performed by: FAMILY MEDICINE

## 2022-08-02 RX ORDER — DOXYCYCLINE HYCLATE 100 MG/1
100 CAPSULE ORAL 2 TIMES DAILY
Qty: 20 CAPSULE | Refills: 0 | Status: SHIPPED | OUTPATIENT
Start: 2022-08-02 | End: 2022-09-02 | Stop reason: SDUPTHER

## 2022-08-02 NOTE — PROGRESS NOTES
"                      Established Patient        Chief Complaint:   Chief Complaint   Patient presents with   • Follow-up        Millie Richardson is a 76 y.o. female    History of Present Illness:   Here today for evaluation of her chronic diastolic CHF, chronic atrial fibrillation with anticoagulation, moderate tricuspid and mitral regurgitation, hypothyroidism, impaired mobility and ADL, vitamin B12 deficiency, recurrent UTI, dysuria and the need for preoperative surgical evaluation for planned cataract surgery in the future.    She denies any chest pain at present, denies syncope or vertigo.  She does report easy fatigability, low exercise tolerance.  She describes easily getting short of breath with exertion/ambulation.  Ambulates predominantly with Rollator.  Denies any new falls or injuries.    She reports some dysuria, described as burning with urination.  No hematuria.  She has a history of recurrent UTIs.    Patient has chronic cataracts, planned intervention by ophthalmology, needs cardiac preprocedural evaluation.    Patient states she continues to diurese well, denies any BRB/BTS.    Subjective     The following portions of the patient's history were reviewed and updated as appropriate: allergies, current medications, past family history, past medical history, past social history, past surgical history and problem list.    Allergies   Allergen Reactions   • Nitrous Oxide Swelling   • Penicillins Anaphylaxis   • Procaine Nausea Only   • Sulfa Antibiotics Other (See Comments)     Patient states \"it made me want to go to bed, I didn't have a fever but I felt like I did\"        Review of Systems  1. Constitutional: Negative for fever. Negative for chills, diaphoresis; malaise/fatigue of chronic nature, no appreciable weight change of unintended nature.  2. HENT: No dysphagia; no changes to vision/hearing/smell/taste; no epistaxis  3. Eyes: Negative for redness and visual disturbance.   4. Respiratory: negative for " "shortness of breath. Negative for chest pain . Negative for cough and chest tightness.   5. Cardiovascular: Negative for chest pain and palpitations.   6. Gastrointestinal: Negative for abdominal distention, abdominal pain and blood in stool.   7. Endocrine: Negative for cold intolerance and heat intolerance.   8. Genitourinary: Negative for difficulty urinating, dysuria and frequency.   9. Musculoskeletal: Negative for arthralgias, back pain and myalgias.  Patient does report easy fatigability.  10. Skin: Negative for color change, rash and wound.   11. Neurological: Negative for syncope, weakness and headaches.   12. Hematological: Negative for adenopathy. Does not bruise/bleed easily.   13. Psychiatric/Behavioral: Negative for confusion. The patient is not nervous/anxious.    Objective     Physical Exam   Vital Signs: /76   Pulse 68   Temp 97.8 °F (36.6 °C)   Resp 15   Ht 172.7 cm (68\")   Wt 87.2 kg (192 lb 3.2 oz)   LMP  (LMP Unknown)   SpO2 97%   BMI 29.22 kg/m²   BMI is >= 25 and <30. (Overweight) The following options were offered after discussion;: weight loss educational material (shared in after visit summary), exercise counseling/recommendations and nutrition counseling/recommendations    General Appearance: alert, oriented x 3, no acute distress.  Skin: warm and dry.   HEENT: Atraumatic.  pupils round and reactive to light and accommodation, oral mucosa pink and moist.  Nares patent without epistaxis.  External auditory canals are patent tympanic membranes intact.  Neck: supple, no JVD, trachea midline.  No thyromegaly  Lungs: CTA, unlabored breathing effort.  Heart: RRR, normal S1 and S2, no S3, no rub.  Chronic heart murmurs.  Abdomen: soft, non-tender, no palpable bladder, present bowel sounds to auscultation ×4.  No guarding or rigidity.  Extremities: no clubbing, cyanosis or edema.  Good range of motion actively and passively.  Symmetric muscle strength and development.  Ambulates with " rollator.  Neuro: normal speech and mental status.  Cranial nerves II through XII intact.  No anosmia. DTR 2+; proprioception intact.  No focal motor/sensory deficits.    Assessment and Plan      Assessment/Plan:   Diagnoses and all orders for this visit:    1. Chronic diastolic (congestive) heart failure (HCC) (Primary)    2. Chronic anticoagulation    3. Moderate tricuspid regurgitation    4. Moderate mitral regurgitation    5. Acquired hypothyroidism  -     TSH  -     T4, Free    6. Combined forms of age-related cataract of both eyes    7. Impaired mobility and ADLs    8. Vitamin B12 deficiency  -     Vitamin B12    9. Recurrent UTI  -     Urine Culture - , Urine, Clean Catch  -     doxycycline (VIBRAMYCIN) 100 MG capsule; Take 1 capsule by mouth 2 (Two) Times a Day for 10 days.  Dispense: 20 capsule; Refill: 0    10. Dysuria  -     Urine Culture - , Urine, Clean Catch  -     doxycycline (VIBRAMYCIN) 100 MG capsule; Take 1 capsule by mouth 2 (Two) Times a Day for 10 days.  Dispense: 20 capsule; Refill: 0    11. Preoperative evaluation to rule out surgical contraindication  -     Ambulatory Referral to Cardiology      Normal walking oximetry.  I do suspect a degree of her perceived malaise/fatigue and exercise intolerance is a result of physical deconditioning.  I have encouraged her to become more active as able.  Fall precautions in place.    Eval thyroid labs today; dose adj if indicated.    Check Vit B12 today; low end of normal on labs when last evaluated.    Discussed need for reduction in sodium/salt/caffeine intake; improve sleep habits as able; inc formal CV exercise program with goal of vigorous activity most, if not all, days of the week; goal of 250 min of sustained HR CV exercise total per week.    Plan to follow results of urine culture.  I have elected to utilize a 10-day course of doxycycline given her recurrence of UTIs in the past.    Evaluation of vitamin B12 level.    Continue daily/frequent  weight evaluations, utilize low-sodium/salt dietary intake as per above.  She demonstrates no findings of acute volume overload at this time.      Discussion Summary:    I spent 40 minutes caring for Millie on this date of service. This time includes time spent by me in the following activities:preparing for the visit, performing a medically appropriate examination and/or evaluation , counseling and educating the patient/family/caregiver, ordering medications, tests, or procedures, documenting information in the medical record, independently interpreting results and communicating that information with the patient/family/caregiver and care coordination    Discussed plan of care in detail with pt today; pt verb understanding and agrees.  Follow up:  No follow-ups on file.     There are no Patient Instructions on file for this visit.    Rajan Luis,   08/02/22  16:18 EDT          Please note that portions of this note may have been completed with a voice recognition program. Efforts were made to edit the dictations, but occasionally words are mistranscribed.

## 2022-08-03 LAB
T4 FREE SERPL-MCNC: 0.89 NG/DL (ref 0.82–1.77)
TSH SERPL DL<=0.005 MIU/L-ACNC: 2.49 UIU/ML (ref 0.45–4.5)
VIT B12 SERPL-MCNC: 392 PG/ML (ref 232–1245)

## 2022-08-07 LAB
BACTERIA UR CULT: ABNORMAL
BACTERIA UR CULT: ABNORMAL
OTHER ANTIBIOTIC SUSC ISLT: ABNORMAL

## 2022-08-08 ENCOUNTER — TELEPHONE (OUTPATIENT)
Dept: FAMILY MEDICINE CLINIC | Facility: CLINIC | Age: 77
End: 2022-08-08

## 2022-08-08 NOTE — TELEPHONE ENCOUNTER
Caller: Millie Richardson    Relationship: Self    Best call back number: [phone]    What is the medical concern/diagnosis: CARDIOLOGIST     What specialty or service is being requested: CARDIOLOGIST     What is the provider, practice or medical service name: DOCTOR [facility] IN [facility]  (PATIENT STATES THAT THE DOCTOR IS WITH [facility])    What is the office location: [facility]        Any additional details: THE PATIENT STATES THAT SHE WAS REFERRED TO DOCTOR MILLER BUT SHE WOULD RATHER SEE DOCTOR GUTHRIE PLEASE CALL THE PATIENT TO LET HER KNOW IF THAT CAN BE DONE

## 2022-08-08 NOTE — TELEPHONE ENCOUNTER
Patient called back and I discussed with her. She wanted referral to be sent to Dr Dunn to be scheduled in the [place] office at [facility].  I have re-routed her referral for scheduling with Dr Dunn. They will call her to discuss scheduling.

## 2022-08-11 ENCOUNTER — TELEPHONE (OUTPATIENT)
Dept: CARDIOLOGY | Facility: CLINIC | Age: 77
End: 2022-08-11

## 2022-08-11 DIAGNOSIS — I27.20 PULMONARY HYPERTENSION: Primary | ICD-10-CM

## 2022-08-11 DIAGNOSIS — M35.00 SJOGREN'S SYNDROME, WITH UNSPECIFIED ORGAN INVOLVEMENT: ICD-10-CM

## 2022-08-11 PROBLEM — R93.6 ABNORMAL X-RAY OF FEMUR: Status: RESOLVED | Noted: 2020-07-20 | Resolved: 2022-08-11

## 2022-08-11 PROBLEM — I48.21 PERMANENT ATRIAL FIBRILLATION: Status: RESOLVED | Noted: 2020-12-08 | Resolved: 2022-08-11

## 2022-08-11 PROBLEM — S01.81XA LACERATION OF FOREHEAD: Status: RESOLVED | Noted: 2022-03-01 | Resolved: 2022-08-11

## 2022-08-11 NOTE — TELEPHONE ENCOUNTER
This patient has contacted [facility] and requesting transfer of care from Dr. Harrison Cintron to myself.    I have reviewed her chart.  Dr. Cintron saw her in 2020, but she did not keep her follow-up appointment.  She was scheduled to see Dr. Rogers for permanent atrial fibrillation which she canceled.  She ultimately saw Dr. Dasilva once and then canceled the follow-up appointment.    She has permanent atrial fibrillation which from the chart appears to be reasonably well controlled.  Her primary issue is severe pulmonary hypertension.  She has been seen at the [facility] pulmonary hypertension clinic in the past.  With her history of connective tissue disorder disease and severe pulmonary hypertension, the most appropriate avenue for her cardiac care should be in a specialized pulmonary hypertension clinic, such as  or [facility].    Request denied.    FLORENCE Dunn MD [facility], [facility]  Interventional and General Cardiology

## 2022-08-12 ENCOUNTER — TELEPHONE (OUTPATIENT)
Dept: FAMILY MEDICINE CLINIC | Facility: CLINIC | Age: 77
End: 2022-08-12

## 2022-08-12 NOTE — TELEPHONE ENCOUNTER
Rx Refill Note  Requested Prescriptions      No prescriptions requested or ordered in this encounter      Last office visit with prescribing clinician: 8/2/2022      Next office visit with prescribing clinician: 11/8/2022            Leah Muniz MA  08/12/22, 12:45 EDT

## 2022-08-12 NOTE — TELEPHONE ENCOUNTER
Caller: Millie Richardson    Relationship: Self    Best call back number: [phone]    Requested Prescriptions: ANTIBIOTIC  Requested Prescriptions      No prescriptions requested or ordered in this encounter        Pharmacy where request should be sent: [facility] - [place], [place] - [address] - [phone]  - [phone] FX     Additional details provided by patient: IVY SAYS SHE WILL BE OUT OF THIS 08/13/22    Does the patient have less than a 3 day supply:  [x] Yes  [] No    Joann Escalante   08/12/22 12:20 EDT

## 2022-08-15 DIAGNOSIS — N39.0 RECURRENT UTI: ICD-10-CM

## 2022-08-15 DIAGNOSIS — R30.0 DYSURIA: ICD-10-CM

## 2022-08-15 NOTE — TELEPHONE ENCOUNTER
Caller: Dylan Millie M    Relationship: Self    Best call back number: [phone]     Requested Prescriptions:   Requested Prescriptions     Pending Prescriptions Disp Refills   • doxycycline (VIBRAMYCIN) 100 MG capsule [Pharmacy Med Name: doxycycline hyclate 100 mg capsule] 20 capsule 0     Sig: TAKE ONE CAPSULE BY MOUTH TWICE DAILY FOR 10 DAYS        Pharmacy where request should be sent: [facility] - [place], [place] - [address] - [phone]  - [phone] FX     Additional details provided by patient: PATIENT IS OUT OF MEDICATION    Does the patient have less than a 3 day supply:  [x] Yes  [] No    Joann Angela Rep   08/15/22 15:59 EDT

## 2022-08-17 RX ORDER — DOXYCYCLINE HYCLATE 100 MG/1
CAPSULE ORAL
Qty: 20 CAPSULE | Refills: 0 | OUTPATIENT
Start: 2022-08-17

## 2022-08-23 ENCOUNTER — TELEPHONE (OUTPATIENT)
Dept: FAMILY MEDICINE CLINIC | Facility: CLINIC | Age: 77
End: 2022-08-23

## 2022-08-23 DIAGNOSIS — I10 ESSENTIAL HYPERTENSION: ICD-10-CM

## 2022-08-23 RX ORDER — POTASSIUM CHLORIDE 750 MG/1
TABLET, FILM COATED, EXTENDED RELEASE ORAL
Qty: 30 TABLET | Refills: 0 | Status: SHIPPED | OUTPATIENT
Start: 2022-08-23 | End: 2022-09-16

## 2022-09-02 DIAGNOSIS — R30.0 DYSURIA: ICD-10-CM

## 2022-09-02 DIAGNOSIS — N39.0 RECURRENT UTI: ICD-10-CM

## 2022-09-02 RX ORDER — DOXYCYCLINE HYCLATE 100 MG/1
100 CAPSULE ORAL 2 TIMES DAILY
Qty: 20 CAPSULE | Refills: 0 | Status: SHIPPED | OUTPATIENT
Start: 2022-09-02 | End: 2022-09-12

## 2022-09-07 ENCOUNTER — TELEPHONE (OUTPATIENT)
Dept: FAMILY MEDICINE CLINIC | Facility: CLINIC | Age: 77
End: 2022-09-07

## 2022-09-08 ENCOUNTER — TELEPHONE (OUTPATIENT)
Dept: FAMILY MEDICINE CLINIC | Facility: CLINIC | Age: 77
End: 2022-09-08

## 2022-09-08 NOTE — TELEPHONE ENCOUNTER
PATIENT STATED THAT [facility] HAS BEEN PUTTING THE ELOQUIS IN HER MEDICATION BOX AND SHE THOUGHT THAT DR COLLIER WANTED HER TO STOP TAKING THAT MEDICATION?     PLEASE ADVISE

## 2022-09-15 DIAGNOSIS — Z01.419 ENCOUNTER FOR GYNECOLOGICAL EXAMINATION WITHOUT ABNORMAL FINDING: Primary | ICD-10-CM

## 2022-09-16 DIAGNOSIS — I10 ESSENTIAL HYPERTENSION: ICD-10-CM

## 2022-09-16 DIAGNOSIS — F51.04 PSYCHOPHYSIOLOGICAL INSOMNIA: ICD-10-CM

## 2022-09-16 DIAGNOSIS — F33.9 MAJOR DEPRESSION, RECURRENT, CHRONIC: ICD-10-CM

## 2022-09-16 DIAGNOSIS — I48.19 ATRIAL FIBRILLATION, PERSISTENT: ICD-10-CM

## 2022-09-16 RX ORDER — BUPROPION HYDROCHLORIDE 300 MG/1
TABLET ORAL
Qty: 30 TABLET | Refills: 0 | Status: SHIPPED | OUTPATIENT
Start: 2022-09-16 | End: 2022-10-11

## 2022-09-16 RX ORDER — METOPROLOL SUCCINATE 200 MG/1
TABLET, EXTENDED RELEASE ORAL
Qty: 50 TABLET | Refills: 0 | Status: SHIPPED | OUTPATIENT
Start: 2022-09-16 | End: 2022-10-11

## 2022-09-16 RX ORDER — POTASSIUM CHLORIDE 750 MG/1
TABLET, FILM COATED, EXTENDED RELEASE ORAL
Qty: 30 TABLET | Refills: 0 | Status: SHIPPED | OUTPATIENT
Start: 2022-09-16 | End: 2022-10-11

## 2022-09-16 RX ORDER — AMITRIPTYLINE HYDROCHLORIDE 25 MG/1
TABLET, FILM COATED ORAL
Qty: 60 TABLET | Refills: 3 | Status: SHIPPED | OUTPATIENT
Start: 2022-09-16 | End: 2023-01-09

## 2022-09-16 NOTE — TELEPHONE ENCOUNTER
Rx Refill Note  Requested Prescriptions     Pending Prescriptions Disp Refills   • amitriptyline (ELAVIL) 25 MG tablet [Pharmacy Med Name: amitriptyline 25 mg tablet] 60 tablet 3     Sig: TAKE 1 TO 2 TABLETS BY MOUTH AT BEDTIME      Last office visit with prescribing clinician: 3/1/2021      Next office visit with prescribing clinician: Visit date not found            Zahira Squires MA  09/16/22, 17:39 EDT

## 2022-10-07 ENCOUNTER — TELEPHONE (OUTPATIENT)
Dept: FAMILY MEDICINE CLINIC | Facility: CLINIC | Age: 77
End: 2022-10-07

## 2022-10-07 NOTE — TELEPHONE ENCOUNTER
Caller: Millie Richardson    Relationship: Self    Best call back number: [phone]    What is the best time to reach you: ANY    Who are you requesting to speak with (clinical staff, provider,  specific staff member): DR. COLLIER    What was the call regarding: PATIENT RECEIVED A LETTER IN THE MAIL ABOUT A GYNECOLOGY REFERRAL AND WOULD LIKE TO DISCUSS WHY THIS MIGHT BE NECESSARY.    Do you require a callback: YES, PLEASE CALL BACK TO ADVISE.     THANK YOU.

## 2022-10-11 DIAGNOSIS — I48.19 ATRIAL FIBRILLATION, PERSISTENT: ICD-10-CM

## 2022-10-11 DIAGNOSIS — I10 ESSENTIAL HYPERTENSION: ICD-10-CM

## 2022-10-11 DIAGNOSIS — Z79.01 CHRONIC ANTICOAGULATION: ICD-10-CM

## 2022-10-11 DIAGNOSIS — F33.9 MAJOR DEPRESSION, RECURRENT, CHRONIC: ICD-10-CM

## 2022-10-11 RX ORDER — METOPROLOL SUCCINATE 200 MG/1
TABLET, EXTENDED RELEASE ORAL
Qty: 50 TABLET | Refills: 0 | Status: SHIPPED | OUTPATIENT
Start: 2022-10-11 | End: 2022-12-27

## 2022-10-11 RX ORDER — POTASSIUM CHLORIDE 750 MG/1
TABLET, FILM COATED, EXTENDED RELEASE ORAL
Qty: 30 TABLET | Refills: 0 | Status: SHIPPED | OUTPATIENT
Start: 2022-10-11 | End: 2022-11-07

## 2022-10-11 RX ORDER — APIXABAN 5 MG/1
TABLET, FILM COATED ORAL
Qty: 180 TABLET | Refills: 0 | Status: SHIPPED | OUTPATIENT
Start: 2022-10-11 | End: 2023-02-16 | Stop reason: SDUPTHER

## 2022-10-11 RX ORDER — BUPROPION HYDROCHLORIDE 300 MG/1
TABLET ORAL
Qty: 30 TABLET | Refills: 0 | Status: SHIPPED | OUTPATIENT
Start: 2022-10-11 | End: 2022-11-07

## 2022-10-13 ENCOUNTER — TELEPHONE (OUTPATIENT)
Dept: FAMILY MEDICINE CLINIC | Facility: CLINIC | Age: 77
End: 2022-10-13

## 2022-10-13 NOTE — TELEPHONE ENCOUNTER
Called patient about gyno referral she stated she had that done 2 months prior at Dr doran office [facility]

## 2022-10-18 ENCOUNTER — OFFICE VISIT (OUTPATIENT)
Dept: CARDIOLOGY | Facility: CLINIC | Age: 77
End: 2022-10-18

## 2022-10-18 VITALS
HEIGHT: 68 IN | HEART RATE: 96 BPM | BODY MASS INDEX: 27.43 KG/M2 | DIASTOLIC BLOOD PRESSURE: 80 MMHG | OXYGEN SATURATION: 96 % | WEIGHT: 181 LBS | SYSTOLIC BLOOD PRESSURE: 120 MMHG

## 2022-10-18 DIAGNOSIS — I48.21 PERMANENT ATRIAL FIBRILLATION: Primary | ICD-10-CM

## 2022-10-18 DIAGNOSIS — I27.20 PULMONARY HYPERTENSION: ICD-10-CM

## 2022-10-18 DIAGNOSIS — I10 ESSENTIAL HYPERTENSION: ICD-10-CM

## 2022-10-18 DIAGNOSIS — I50.32 CHRONIC DIASTOLIC (CONGESTIVE) HEART FAILURE: ICD-10-CM

## 2022-10-18 PROCEDURE — 99214 OFFICE O/P EST MOD 30 MIN: CPT | Performed by: NURSE PRACTITIONER

## 2022-10-18 PROCEDURE — 93000 ELECTROCARDIOGRAM COMPLETE: CPT | Performed by: NURSE PRACTITIONER

## 2022-10-18 NOTE — PROGRESS NOTES
Cardiology Office Follow Up Note    Millie Richardson  [id number]  10/18/2022    Primary Care Provider: Rajan Luis DO   Referring Provider: No ref. provider found    Chief Complaint: Cardiac clearance    History of Present Illness:   Mr. Millie Richardson is a 77 y.o. female who presents to the Cardiology Clinic for regular follow-up.  The patient has a past medical history of myocardial infarction (1999?),  permanent atrial fibrillation, hypertension, diastolic congestive heart failure, myocardial infarction, Sjorgen syndrome, pulmonary hypertension.  She last saw Dr. Cintron in 2020 but did not present to the clinic for follow-up.  At one point, she was scheduled to see Dr. Rogers for arrhythmia management, but this appointment was not kept.  She did see Dr. Dasilva once, but canceled her follow-up appointment for this.  She presents today for cardiac clearance for bilateral cataract surgery.  She is unable to identify the doctor or facility where this will be performed at date [id number].  She reports medication compliance and says she feels well overall.  She states she no longer goes to  pulmonary hypertension and she will not be going back.  She specifically denies chest pain and dyspnea.  She denies palpitations, dizziness, and syncope.  She denies orthopnea, PND, and lower extremity edema.  She continues to take Eliquis without significant bruising or bleeding.  She reports she is not interested in returning to the clinic for follow-up.        Review of Systems:   Review of Systems   Constitutional: Negative for activity change, chills, diaphoresis, fatigue, fever and unexpected weight gain.   Eyes: Negative for blurred vision and visual disturbance.   Respiratory: Negative for apnea, cough, chest tightness, shortness of breath and wheezing.    Cardiovascular: Negative for chest pain, palpitations and leg swelling.   Gastrointestinal: Negative for abdominal distention,  blood in stool, GERD and indigestion.   Endocrine: Negative for cold intolerance and heat intolerance.   Genitourinary: Negative for hematuria.   Musculoskeletal: Negative for gait problem, joint swelling and myalgias.   Skin: Negative for color change, pallor and wound.   Neurological: Negative for dizziness, seizures, syncope, weakness, light-headedness, numbness, headache and confusion.   Hematological: Does not bruise/bleed easily.   Psychiatric/Behavioral: Negative for behavioral problems, sleep disturbance, suicidal ideas and depressed mood.     I have reviewed and confirmed the accuracy of the ROS as documented by the MA/LPN/RN MARIA L Lake    I have reviewed and/or updated the patient's past medical, past surgical, family, social history, problem list and allergies as appropriate.     Medications:     Current Outpatient Medications:   •  amitriptyline (ELAVIL) 25 MG tablet, TAKE 1 TO 2 TABLETS BY MOUTH AT BEDTIME, Disp: 60 tablet, Rfl: 3  •  amLODIPine (NORVASC) 5 MG tablet, TAKE ONE TABLET BY MOUTH ONCE DAILY, Disp: 90 tablet, Rfl: 3  •  buPROPion XL (WELLBUTRIN XL) 300 MG 24 hr tablet, TAKE ONE TABLET BY MOUTH DAILY, Disp: 30 tablet, Rfl: 0  •  Eliquis 5 MG tablet tablet, TAKE ONE TABLET BY MOUTH TWICE DAILY, Disp: 180 tablet, Rfl: 0  •  lisinopril (PRINIVIL,ZESTRIL) 40 MG tablet, TAKE ONE TABLET BY MOUTH ONCE DAILY, Disp: 90 tablet, Rfl: 1  •  metoprolol succinate XL (TOPROL-XL) 200 MG 24 hr tablet, TAKE ONE TABLET BY MOUTH ONCE DAILY, Disp: 50 tablet, Rfl: 0  •  Misc. Devices (Bath/Shower Seat) misc, 1 Units As Needed (bathing)., Disp: 1 each, Rfl: 0  •  Misc. Devices (Rollator Ultra-Light) misc, 1 Units Daily., Disp: 1 each, Rfl: 0  •  NP Thyroid 90 MG tablet, TAKE ONE TABLET BY MOUTH ONCE DAILY, Disp: 90 tablet, Rfl: 0  •  pantoprazole (PROTONIX) 40 MG EC tablet, TAKE ONE TABLET BY MOUTH ONCE DAILY, Disp: 90 tablet, Rfl: 1  •  potassium chloride 10 MEQ CR tablet, TAKE ONE TABLET BY MOUTH  "ONCE DAILY, Disp: 30 tablet, Rfl: 0  •  venlafaxine (EFFEXOR) 100 MG tablet, TAKE ONE TABLET BY MOUTH TWICE DAILY, Disp: 180 tablet, Rfl: 1    Physical Exam:  Vital Signs:   Vitals:    10/18/22 1432   BP: 120/80   BP Location: Left arm   Patient Position: Sitting   Cuff Size: Adult   Pulse: 96   SpO2: 96%   Weight: 82.1 kg (181 lb)   Height: 172.7 cm (68\")     Body mass index is 27.52 kg/m².    Physical Exam  Vitals and nursing note reviewed.   Constitutional:       General: She is not in acute distress.     Appearance: Normal appearance. She is well-developed.   HENT:      Head: Normocephalic and atraumatic.   Eyes:      General: No scleral icterus.     Extraocular Movements: Extraocular movements intact.   Neck:      Trachea: Trachea normal.   Cardiovascular:      Rate and Rhythm: Normal rate. Rhythm irregular.      Pulses: Normal pulses.      Heart sounds: Normal heart sounds. No murmur heard.    No friction rub. No gallop.   Pulmonary:      Effort: Pulmonary effort is normal.      Breath sounds: Normal breath sounds.   Musculoskeletal:         General: Normal range of motion.      Cervical back: Neck supple.      Right lower leg: No edema.      Left lower leg: No edema.   Skin:     General: Skin is warm and dry.      Findings: No bruising, lesion or rash.   Neurological:      Mental Status: She is alert and oriented to person, place, and time.      Motor: No weakness.      Gait: Gait normal.   Psychiatric:         Mood and Affect: Mood normal.         Behavior: Behavior normal. Behavior is cooperative.         Thought Content: Thought content does not include suicidal ideation.         Results Review:   I reviewed the patient's new clinical results.      ECG 12 Lead    Date/Time: 10/18/2022 12:51 PM  Performed by: Alicia Patterson APRN  Authorized by: Alicia Patterson APRN   Comparison: compared with previous ECG from 12/8/2020  Rhythm: atrial fibrillation  Rate: normal  BPM: 64  QRS axis: left  Other " findings: left ventricular hypertrophy    Clinical impression: abnormal EKG            Assessment / Plan:     1. Permanent atrial fibrillation (HCC) (Primary)  --ECG shows atrial fibrillation  --Continue metoprolol for rate control  --[id number]  --Continue Eliquis for CVA prophylaxis    2. Essential hypertension  --Currently well controlled    3. Chronic diastolic (congestive) heart failure (HCC)  --2/20, LVEF 55-60%, grade III diastolic dysfunction    4. Pulmonary hypertension (HCC)  --2/20, Severe, RVSP 78 mmHg     5. Preoperative cardiac examination  --Currently doing without without any angina, exertional symptoms, dyspnea  --ECG shows rate controlled atrial fibrillation  --Assuming her creatinine is < 2,  low risk per Revised Cardiac Risk Index  --Can HOLD Eliquis 3 days prior to procedure and resume post-procedure day 1      Preventative Cardiology:   Tobacco Cessation: N/A   Advance Care Planning: ACP discussion was declined by the patient. Patient has an advance directive in EMR which is still valid.      Follow Up:   Return in about 6 months (around 4/18/2023) for Follow-up with Dr. Cintron.      Thank you for allowing me to participate in the care of your patient. Please to not hesitate to contact me with additional questions or concerns.     Alicia Patterson, APRN

## 2022-10-20 ENCOUNTER — TELEPHONE (OUTPATIENT)
Dept: CARDIOLOGY | Facility: CLINIC | Age: 77
End: 2022-10-20

## 2022-10-20 ENCOUNTER — PATIENT MESSAGE (OUTPATIENT)
Dept: CARDIOLOGY | Facility: CLINIC | Age: 77
End: 2022-10-20

## 2022-10-20 NOTE — TELEPHONE ENCOUNTER
"Can we call and see where this patient wants her \"cardiac clearance letter\" faxed?    Or she can pick one up from the  if she likes.    Thanks.  "

## 2022-11-05 DIAGNOSIS — I10 ESSENTIAL HYPERTENSION: ICD-10-CM

## 2022-11-05 DIAGNOSIS — F33.9 MAJOR DEPRESSION, RECURRENT, CHRONIC: ICD-10-CM

## 2022-11-07 RX ORDER — BUPROPION HYDROCHLORIDE 300 MG/1
TABLET ORAL
Qty: 30 TABLET | Refills: 0 | Status: SHIPPED | OUTPATIENT
Start: 2022-11-07 | End: 2022-12-27

## 2022-11-07 RX ORDER — POTASSIUM CHLORIDE 750 MG/1
TABLET, FILM COATED, EXTENDED RELEASE ORAL
Qty: 30 TABLET | Refills: 0 | Status: SHIPPED | OUTPATIENT
Start: 2022-11-07 | End: 2022-12-27

## 2022-11-08 ENCOUNTER — OFFICE VISIT (OUTPATIENT)
Dept: FAMILY MEDICINE CLINIC | Facility: CLINIC | Age: 77
End: 2022-11-08

## 2022-11-08 VITALS
HEART RATE: 69 BPM | TEMPERATURE: 97.9 F | HEIGHT: 68 IN | SYSTOLIC BLOOD PRESSURE: 130 MMHG | WEIGHT: 189.6 LBS | BODY MASS INDEX: 28.73 KG/M2 | DIASTOLIC BLOOD PRESSURE: 68 MMHG | OXYGEN SATURATION: 98 % | RESPIRATION RATE: 14 BRPM

## 2022-11-08 DIAGNOSIS — Z78.9 IMPAIRED MOBILITY AND ADLS: ICD-10-CM

## 2022-11-08 DIAGNOSIS — E03.9 ACQUIRED HYPOTHYROIDISM: ICD-10-CM

## 2022-11-08 DIAGNOSIS — R00.2 INTERMITTENT PALPITATIONS: ICD-10-CM

## 2022-11-08 DIAGNOSIS — Z74.09 IMPAIRED MOBILITY AND ADLS: ICD-10-CM

## 2022-11-08 DIAGNOSIS — Z79.01 CHRONIC ANTICOAGULATION: ICD-10-CM

## 2022-11-08 DIAGNOSIS — I48.21 PERMANENT ATRIAL FIBRILLATION: ICD-10-CM

## 2022-11-08 DIAGNOSIS — I50.32 CHRONIC DIASTOLIC (CONGESTIVE) HEART FAILURE: Primary | ICD-10-CM

## 2022-11-08 DIAGNOSIS — R55 NEAR SYNCOPE: ICD-10-CM

## 2022-11-08 DIAGNOSIS — K21.9 GASTROESOPHAGEAL REFLUX DISEASE WITHOUT ESOPHAGITIS: Chronic | ICD-10-CM

## 2022-11-08 PROBLEM — H44.23 BILATERAL DEGENERATIVE PROGRESSIVE HIGH MYOPIA: Status: ACTIVE | Noted: 2021-07-06

## 2022-11-08 PROCEDURE — 99214 OFFICE O/P EST MOD 30 MIN: CPT | Performed by: FAMILY MEDICINE

## 2022-11-08 NOTE — PROGRESS NOTES
"                      Established Patient        Chief Complaint:   Chief Complaint   Patient presents with   • Follow-up   • Hypothyroidism        Millie Richardson is a 77 y.o. female    History of Present Illness:   Here today in scheduled follow-up visit of chronic diastolic CHF, atrial fibrillation, chronic anticoagulation, hypothyroidism, GERD, impaired mobility.    She denies any active chest pain; she does report near syncopal episodes that have happened on numerous occasions weekly over the last several weeks.  She denies any epistaxis/hemoptysis.  Denies hematuria/dysuria.  Denies focal aspiration, no reports of BRB/BTS.    Denies worsened edema to lower extremities.    She did suffer an accidental mechanical fall, no loss of consciousness; patient remembers the entirety of the fall.    Subjective     The following portions of the patient's history were reviewed and updated as appropriate: allergies, current medications, past family history, past medical history, past social history, past surgical history and problem list.    Allergies   Allergen Reactions   • Nitrous Oxide Swelling   • Penicillins Anaphylaxis   • Procaine Nausea Only   • Sulfa Antibiotics Other (See Comments)     Patient states \"it made me want to go to bed, I didn't have a fever but I felt like I did\"        Review of Systems  1. Constitutional: Negative for fever. Negative for chills, diaphoresis; malaise/fatigue of chronic nature, no appreciable weight change of unintended nature.  2. HENT: No dysphagia; no changes to vision/hearing/smell/taste; no epistaxis  3. Eyes: Negative for redness and visual disturbance.   4. Respiratory: negative for shortness of breath. Negative for chest pain . Negative for cough and chest tightness.   5. Cardiovascular: Negative for chest pain; periodic symptomatic palpitations.  6. Gastrointestinal: Negative for abdominal distention, abdominal pain and blood in stool.   7. Endocrine: Negative for cold intolerance " "and heat intolerance.   8. Genitourinary: Negative for difficulty urinating, dysuria and frequency.   9. Musculoskeletal: Chronic arthralgias, back pain and myalgias.  Patient does report easy fatigability.  10. Skin: Negative for color change, rash and wound.   11. Neurological: Near syncope as per above.  Generalized weakness of chronic nature.  12. Hematological: Negative for adenopathy. Does not bruise/bleed easily.   13. Psychiatric/Behavioral: Negative for confusion. The patient is not nervous/anxious.    Objective     Physical Exam   Vital Signs: /68   Pulse 69   Temp 97.9 °F (36.6 °C)   Resp 14   Ht 172.7 cm (68\")   Wt 86 kg (189 lb 9.6 oz)   LMP  (LMP Unknown)   SpO2 98%   BMI 28.83 kg/m²   BMI is >= 25 and <30. (Overweight) The following options were offered after discussion;: weight loss educational material (shared in after visit summary), exercise counseling/recommendations and nutrition counseling/recommendations    General Appearance: alert, oriented x 3, no acute distress.  Skin: warm and dry.  Without jaundice.  HEENT: Atraumatic.  pupils round and reactive to light and accommodation, oral mucosa pink and moist.  Nares patent without epistaxis.  External auditory canals are patent tympanic membranes intact.  Neck: supple, no JVD, trachea midline.  No thyromegaly  Lungs: CTA, unlabored breathing effort.  Heart: RRR, normal S1 and S2, no S3, no rub.  Chronic heart murmurs.  Abdomen: soft, non-tender, no palpable bladder, present bowel sounds to auscultation ×4.  No guarding or rigidity.  Extremities: no clubbing, cyanosis or edema.  Good range of motion actively and passively.  Symmetric muscle strength and development.  Ambulates with rollator.  Neuro: normal speech and mental status.  Cranial nerves II through XII intact.  No anosmia. DTR 2+; proprioception intact.  No focal motor/sensory deficits.    Assessment and Plan      Assessment/Plan:   Diagnoses and all orders for this " visit:    1. Chronic diastolic (congestive) heart failure (HCC) (Primary)  -     Basic Metabolic Panel    2. Permanent atrial fibrillation (HCC)  -     TSH  -     T4, Free  -     CBC & Differential  -     Cardiac Event Monitor; Future    3. Chronic anticoagulation  -     CBC & Differential    4. Acquired hypothyroidism  -     TSH  -     T4, Free    5. Gastroesophageal reflux disease without esophagitis    6. Impaired mobility and ADLs    7. Intermittent palpitations  -     Cardiac Event Monitor; Future    8. Near syncope  -     Cardiac Event Monitor; Future      Cardiac event monitor will be ordered due to intermittent palpitations and near syncopal episodes, particularly in light of her known history of dysrhythmia.    Continue low-sodium dietary intake, frequent weight evaluations, preferably daily.  Monitor closely for any findings of acute volume overload, demonstrates none at this time.    Thyroid function studies today, dosage adjustment if indicated.    Continue PPI therapy, as well as dietary/lifestyle modifications to aid in symptom management of chronic GERD.    Continue Rollator use.    Vital signs demonstrate hemodynamic stability.      Discussion Summary:    Discussed plan of care in detail with pt today; pt verb understanding and agrees.    I spent 30 minutes caring for Millie on this date of service. This time includes time spent by me in the following activities:preparing for the visit, performing a medically appropriate examination and/or evaluation , counseling and educating the patient/family/caregiver, ordering medications, tests, or procedures, documenting information in the medical record and care coordination    I have reviewed and updated all copied forward information, as appropriate.  I attest to the accuracy and relevance of any unchanged information.    Follow up:  No follow-ups on file.     There are no Patient Instructions on file for this visit.    Rajan Luis DO  11/08/22  14:35  EST          Please note that portions of this note may have been completed with a voice recognition program. Efforts were made to edit the dictations, but occasionally words are mistranscribed.

## 2022-11-09 LAB
BASOPHILS # BLD AUTO: 0.04 10*3/MM3 (ref 0–0.2)
BASOPHILS NFR BLD AUTO: 0.7 % (ref 0–1.5)
BUN SERPL-MCNC: 22 MG/DL (ref 8–23)
BUN/CREAT SERPL: 31.4 (ref 7–25)
CALCIUM SERPL-MCNC: 9.1 MG/DL (ref 8.6–10.5)
CHLORIDE SERPL-SCNC: 105 MMOL/L (ref 98–107)
CO2 SERPL-SCNC: 26.4 MMOL/L (ref 22–29)
CREAT SERPL-MCNC: 0.7 MG/DL (ref 0.57–1)
EGFRCR SERPLBLD CKD-EPI 2021: 88.1 ML/MIN/1.73
EOSINOPHIL # BLD AUTO: 0.32 10*3/MM3 (ref 0–0.4)
EOSINOPHIL NFR BLD AUTO: 5.5 % (ref 0.3–6.2)
ERYTHROCYTE [DISTWIDTH] IN BLOOD BY AUTOMATED COUNT: 13.9 % (ref 12.3–15.4)
GLUCOSE SERPL-MCNC: 93 MG/DL (ref 65–99)
HCT VFR BLD AUTO: 40.2 % (ref 34–46.6)
HGB BLD-MCNC: 13.1 G/DL (ref 12–15.9)
IMM GRANULOCYTES # BLD AUTO: 0.01 10*3/MM3 (ref 0–0.05)
IMM GRANULOCYTES NFR BLD AUTO: 0.2 % (ref 0–0.5)
LYMPHOCYTES # BLD AUTO: 1.88 10*3/MM3 (ref 0.7–3.1)
LYMPHOCYTES NFR BLD AUTO: 32.2 % (ref 19.6–45.3)
MCH RBC QN AUTO: 29.2 PG (ref 26.6–33)
MCHC RBC AUTO-ENTMCNC: 32.6 G/DL (ref 31.5–35.7)
MCV RBC AUTO: 89.7 FL (ref 79–97)
MONOCYTES # BLD AUTO: 0.39 10*3/MM3 (ref 0.1–0.9)
MONOCYTES NFR BLD AUTO: 6.7 % (ref 5–12)
NEUTROPHILS # BLD AUTO: 3.19 10*3/MM3 (ref 1.7–7)
NEUTROPHILS NFR BLD AUTO: 54.7 % (ref 42.7–76)
NRBC BLD AUTO-RTO: 0 /100 WBC (ref 0–0.2)
PLATELET # BLD AUTO: 225 10*3/MM3 (ref 140–450)
POTASSIUM SERPL-SCNC: 4.6 MMOL/L (ref 3.5–5.2)
RBC # BLD AUTO: 4.48 10*6/MM3 (ref 3.77–5.28)
SODIUM SERPL-SCNC: 140 MMOL/L (ref 136–145)
T4 FREE SERPL-MCNC: 0.88 NG/DL (ref 0.93–1.7)
TSH SERPL DL<=0.005 MIU/L-ACNC: 1.67 UIU/ML (ref 0.27–4.2)
WBC # BLD AUTO: 5.83 10*3/MM3 (ref 3.4–10.8)

## 2022-11-21 ENCOUNTER — PATIENT ROUNDING (BHMG ONLY) (OUTPATIENT)
Dept: CARDIOLOGY | Facility: CLINIC | Age: 77
End: 2022-11-21

## 2022-12-01 DIAGNOSIS — F41.1 GENERALIZED ANXIETY DISORDER: ICD-10-CM

## 2022-12-01 DIAGNOSIS — F33.9 MAJOR DEPRESSION, RECURRENT, CHRONIC: ICD-10-CM

## 2022-12-01 RX ORDER — VENLAFAXINE 100 MG/1
TABLET ORAL
Qty: 180 TABLET | Refills: 1 | Status: SHIPPED | OUTPATIENT
Start: 2022-12-01

## 2022-12-07 ENCOUNTER — TELEPHONE (OUTPATIENT)
Dept: FAMILY MEDICINE CLINIC | Facility: CLINIC | Age: 77
End: 2022-12-07

## 2022-12-07 NOTE — TELEPHONE ENCOUNTER
Caller: DylanMillie    Relationship: Self    Best call back number: [phone]    What medication are you requesting: UTI MEDICATION    What are your current symptoms: URINARY PAIN AND URGENCY    How long have you been experiencing symptoms: 3 WEEKS    Have you had these symptoms before:    [x] Yes  [] No    Have you been treated for these symptoms before:   [x] Yes  [] No    If a prescription is needed, what is your preferred pharmacy and phone number: [facility] - [place], [place] - [address] - [phone] [facility] [phone]      Additional notes: PLEASE CALL THE PATIENT IF A MEDICATION CAN BE CALLED IN OR TO ADVISE.    THANK YOU.

## 2022-12-13 NOTE — TELEPHONE ENCOUNTER
TALKED TO IVY SHE SAID SHE CAN'T COME IN AND SHE IS 78 YEARS OLD AND KNOWS WHEN SHE HAS A UTI SHE HAS HAD SO MANY AND JUST NEEDS SOME MEDS

## 2022-12-14 DIAGNOSIS — E03.9 ACQUIRED HYPOTHYROIDISM: ICD-10-CM

## 2022-12-15 RX ORDER — LEVOTHYROXINE, LIOTHYRONINE 57; 13.5 UG/1; UG/1
TABLET ORAL
Qty: 90 TABLET | Refills: 0 | Status: SHIPPED | OUTPATIENT
Start: 2022-12-15

## 2022-12-16 ENCOUNTER — TELEPHONE (OUTPATIENT)
Dept: FAMILY MEDICINE CLINIC | Facility: CLINIC | Age: 77
End: 2022-12-16

## 2022-12-16 ENCOUNTER — OFFICE VISIT (OUTPATIENT)
Dept: FAMILY MEDICINE CLINIC | Facility: CLINIC | Age: 77
End: 2022-12-16

## 2022-12-16 VITALS
HEIGHT: 68 IN | BODY MASS INDEX: 29.1 KG/M2 | WEIGHT: 192 LBS | OXYGEN SATURATION: 97 % | HEART RATE: 87 BPM | SYSTOLIC BLOOD PRESSURE: 128 MMHG | DIASTOLIC BLOOD PRESSURE: 68 MMHG | RESPIRATION RATE: 14 BRPM | TEMPERATURE: 97.5 F

## 2022-12-16 DIAGNOSIS — N30.01 ACUTE CYSTITIS WITH HEMATURIA: Primary | ICD-10-CM

## 2022-12-16 DIAGNOSIS — N32.89 BLADDER SPASMS: ICD-10-CM

## 2022-12-16 PROBLEM — H35.3212 EXUDATIVE AGE-RELATED MACULAR DEGENERATION OF RIGHT EYE WITH INACTIVE CHOROIDAL NEOVASCULARIZATION (HCC): Status: ACTIVE | Noted: 2022-12-13

## 2022-12-16 LAB
BILIRUB BLD-MCNC: NEGATIVE MG/DL
CLARITY, POC: ABNORMAL
COLOR UR: ABNORMAL
EXPIRATION DATE: ABNORMAL
GLUCOSE UR STRIP-MCNC: NEGATIVE MG/DL
KETONES UR QL: NEGATIVE
LEUKOCYTE EST, POC: ABNORMAL
Lab: ABNORMAL
NITRITE UR-MCNC: NEGATIVE MG/ML
PH UR: 6 [PH] (ref 5–8)
PROT UR STRIP-MCNC: ABNORMAL MG/DL
RBC # UR STRIP: ABNORMAL /UL
SP GR UR: 1.02 (ref 1–1.03)
UROBILINOGEN UR QL: ABNORMAL

## 2022-12-16 PROCEDURE — 81003 URINALYSIS AUTO W/O SCOPE: CPT | Performed by: NURSE PRACTITIONER

## 2022-12-16 PROCEDURE — 99213 OFFICE O/P EST LOW 20 MIN: CPT | Performed by: NURSE PRACTITIONER

## 2022-12-16 RX ORDER — NITROFURANTOIN 25; 75 MG/1; MG/1
100 CAPSULE ORAL 2 TIMES DAILY
Qty: 14 CAPSULE | Refills: 0 | Status: SHIPPED | OUTPATIENT
Start: 2022-12-16 | End: 2022-12-23

## 2022-12-16 RX ORDER — PHENAZOPYRIDINE HYDROCHLORIDE 200 MG/1
200 TABLET, FILM COATED ORAL 3 TIMES DAILY PRN
Qty: 6 TABLET | Refills: 0 | Status: SHIPPED | OUTPATIENT
Start: 2022-12-16 | End: 2022-12-18

## 2022-12-16 NOTE — PROGRESS NOTES
"                      Established Patient        Chief Complaint:   Chief Complaint   Patient presents with   • Urinary Tract Infection         History of Present Illness:    Millie Richardson is a 77 y.o. female who presents today for complaints of dysuria, frequency, urgency, incontinence, suprapubic pain. Patient presents today with caregiver. Patient reports onset of symptoms about 10 days ago. Has not taken AZO. Patient denies fever, N/V/D, back/flank pain. Patient reports that for the first few days she had the symptoms, she felt like something was swollen in her suprapubic area.     Subjective     The following portions of the patient's history were reviewed and updated as appropriate: allergies, current medications, past family history, past medical history, past social history, past surgical history and problem list.    ALLERGIES  Allergies   Allergen Reactions   • Nitrous Oxide Swelling   • Penicillins Anaphylaxis   • Procaine Nausea Only   • Sulfa Antibiotics Other (See Comments)     Patient states \"it made me want to go to bed, I didn't have a fever but I felt like I did\"        ROS  Review of Systems   Constitutional: Negative for fatigue and fever.   Gastrointestinal: Positive for abdominal pain (suprapubic).   Genitourinary: Positive for dysuria, frequency and urgency. Negative for decreased urine volume, flank pain and hematuria.   All other systems reviewed and are negative.      Objective     Vital Signs:   /68   Pulse 87   Temp 97.5 °F (36.4 °C)   Resp 14   Ht 172.7 cm (68\")   Wt 87.1 kg (192 lb)   LMP  (LMP Unknown)   SpO2 97%   BMI 29.19 kg/m²     Physical Exam   Physical Exam  Vitals and nursing note reviewed.   Constitutional:       Comments: Poorly groomed   HENT:      Head: Normocephalic.      Nose: Nose normal.      Mouth/Throat:      Lips: Pink.   Eyes:      General: Lids are normal.      Conjunctiva/sclera: Conjunctivae normal.      Pupils: Pupils are equal, round, and reactive to " light.   Cardiovascular:      Rate and Rhythm: Normal rate.      Heart sounds: Normal heart sounds.   Pulmonary:      Effort: Pulmonary effort is normal.      Breath sounds: Normal breath sounds.   Abdominal:      General: Bowel sounds are normal.      Tenderness: There is abdominal tenderness in the suprapubic area.   Musculoskeletal:         General: Normal range of motion.   Skin:     General: Skin is warm and dry.   Neurological:      Mental Status: She is alert and oriented to person, place, and time.      Gait: Gait is intact.   Psychiatric:         Attention and Perception: Attention normal.         Mood and Affect: Mood and affect normal.         Speech: Speech normal.         Behavior: Behavior normal. Behavior is cooperative.       Assessment and Plan      Assessment/Plan:   Diagnoses and all orders for this visit:    1. Acute cystitis with hematuria (Primary)  -     POC Urinalysis Dipstick, Automated  -     nitrofurantoin, macrocrystal-monohydrate, (Macrobid) 100 MG capsule; Take 1 capsule by mouth 2 (Two) Times a Day for 7 days.  Dispense: 14 capsule; Refill: 0  -     phenazopyridine (Pyridium) 200 MG tablet; Take 1 tablet by mouth 3 (Three) Times a Day As Needed for Bladder Spasms for up to 2 days.  Dispense: 6 tablet; Refill: 0  -     Urine Culture - Urine, Urine, Clean Catch; Future    2. Bladder spasms  -     phenazopyridine (Pyridium) 200 MG tablet; Take 1 tablet by mouth 3 (Three) Times a Day As Needed for Bladder Spasms for up to 2 days.  Dispense: 6 tablet; Refill: 0  -     Urine Culture - Urine, Urine, Clean Catch; Future      Discussion Summary:  Discussed plan of care in detail with pt today; pt verb understanding and agrees.    Follow up:  Return if symptoms worsen or fail to improve.     Patient Education:  There are no Patient Instructions on file for this visit.    MARIA L Domínguez  12/26/22  17:38 EST          Please note that portions of this note may have been completed with a  voice recognition program.

## 2022-12-20 ENCOUNTER — OFFICE VISIT (OUTPATIENT)
Dept: FAMILY MEDICINE CLINIC | Facility: CLINIC | Age: 77
End: 2022-12-20

## 2022-12-20 ENCOUNTER — TELEPHONE (OUTPATIENT)
Dept: CARDIOLOGY | Facility: CLINIC | Age: 77
End: 2022-12-20

## 2022-12-20 VITALS
DIASTOLIC BLOOD PRESSURE: 62 MMHG | BODY MASS INDEX: 28.55 KG/M2 | TEMPERATURE: 97.3 F | OXYGEN SATURATION: 97 % | HEART RATE: 68 BPM | HEIGHT: 68 IN | SYSTOLIC BLOOD PRESSURE: 122 MMHG | WEIGHT: 188.4 LBS | RESPIRATION RATE: 14 BRPM

## 2022-12-20 DIAGNOSIS — Z79.01 CHRONIC ANTICOAGULATION: ICD-10-CM

## 2022-12-20 DIAGNOSIS — Z78.9 IMPAIRED MOBILITY AND ADLS: ICD-10-CM

## 2022-12-20 DIAGNOSIS — I50.32 CHRONIC DIASTOLIC (CONGESTIVE) HEART FAILURE: Primary | ICD-10-CM

## 2022-12-20 DIAGNOSIS — J44.9 CHRONIC OBSTRUCTIVE BRONCHITIS: ICD-10-CM

## 2022-12-20 DIAGNOSIS — Z74.09 IMPAIRED MOBILITY AND ADLS: ICD-10-CM

## 2022-12-20 DIAGNOSIS — I48.21 PERMANENT ATRIAL FIBRILLATION: ICD-10-CM

## 2022-12-20 PROCEDURE — 99214 OFFICE O/P EST MOD 30 MIN: CPT | Performed by: FAMILY MEDICINE

## 2022-12-20 RX ORDER — FLUTICASONE FUROATE, UMECLIDINIUM BROMIDE AND VILANTEROL TRIFENATATE 100; 62.5; 25 UG/1; UG/1; UG/1
1 POWDER RESPIRATORY (INHALATION)
Qty: 14 EACH | Refills: 0 | COMMUNITY
Start: 2022-12-20

## 2022-12-20 NOTE — PROGRESS NOTES
"                      Established Patient        Chief Complaint:   Chief Complaint   Patient presents with   • Follow-up        Millie Richardson is a 77 y.o. female    History of Present Illness:   Here today for evaluation of her known atrial fibrillation, chronic diastolic CHF, chronic anticoagulation.    Patient does complain today of periodic episodes of coughing/wheezing, describes shortness of breath that last for several minutes in duration, occasionally as long as 1 hour.  It is usually associated with physical activity or environmental changes.  She has a known history of chronic obstructive bronchitis, not currently utilizing daily medication and treatment.  She does report a noticeable worsening as the weather has changed to a cooler environment.  She denies hemoptysis or epistaxis.  Denies aspiration/dysphagia.    Denies any hematuria, BRB/BTS or other findings of active bleeding.  She is tolerating all p.o. intake, does not eat large meals, but rather prefers to snack often.  She reports good hydration, utilizes predominantly water.  Subjective     The following portions of the patient's history were reviewed and updated as appropriate: allergies, current medications, past family history, past medical history, past social history, past surgical history and problem list.    Allergies   Allergen Reactions   • Nitrous Oxide Swelling   • Penicillins Anaphylaxis   • Procaine Nausea Only   • Sulfa Antibiotics Other (See Comments)     Patient states \"it made me want to go to bed, I didn't have a fever but I felt like I did\"        Review of Systems  1. Constitutional: Negative for fever. Negative for chills, diaphoresis; malaise/fatigue of chronic nature, no appreciable weight change of unintended nature.  2. HENT: No dysphagia; no changes to vision/hearing/smell/taste; no epistaxis  3. Eyes: Negative for redness and visual disturbance.   4. Respiratory: As per above.  5. Cardiovascular: Negative for chest pain; " "periodic symptomatic palpitations.  6. Gastrointestinal: Negative for abdominal distention, abdominal pain and blood in stool.   7. Endocrine: Negative for cold intolerance and heat intolerance.   8. Genitourinary: Negative for difficulty urinating, dysuria and frequency.   9. Musculoskeletal: Chronic arthralgias, back pain and myalgias.  Patient does report easy fatigability.  10. Skin: No new rashes or lesions.  11. Neurological: Denies syncope.  Generalized weakness of chronic nature.  12. Hematological: Negative for adenopathy. Does not bruise/bleed easily.   13. Psychiatric/Behavioral: Negative for confusion. The patient is not nervous/anxious.    Objective     Physical Exam   Vital Signs: /62   Pulse 68   Temp 97.3 °F (36.3 °C)   Resp 14   Ht 172.7 cm (68\")   Wt 85.5 kg (188 lb 6.4 oz)   LMP  (LMP Unknown)   SpO2 97%   BMI 28.65 kg/m²   BMI is >= 25 and <30. (Overweight) The following options were offered after discussion;: weight loss educational material (shared in after visit summary), exercise counseling/recommendations and nutrition counseling/recommendations    General Appearance: alert, oriented x 3, no acute distress.  Pleasant and interactive during questioning/examination.  Skin: warm and dry.  Without jaundice.  HEENT: Atraumatic.  pupils round and reactive to light and accommodation, oral mucosa pink and moist.  Nares patent without epistaxis.  External auditory canals are patent tympanic membranes intact.  Neck: supple, no JVD, trachea midline.  No thyromegaly  Lungs: Audible air exchange noted all lung fields, unlabored breathing effort.  Prolonged expiratory phase bilaterally, rhonchus and referred upper airway congestion to bilateral lungs, cleared with light cough.  Heart: RRR, normal S1 and S2, no S3, no rub.  Chronic heart murmurs.  Abdomen: soft, non-tender, no palpable bladder, present bowel sounds to auscultation ×4.  No guarding or rigidity.  Extremities: no clubbing, " cyanosis or edema.  Good range of motion actively and passively.  Symmetric muscle strength and development.  Ambulates with rollator.  Neuro: normal speech and mental status.  Cranial nerves II through XII intact.  No anosmia. DTR 2+; proprioception intact.  No focal motor/sensory deficits.    Assessment and Plan      Assessment/Plan:   Diagnoses and all orders for this visit:    1. Chronic diastolic (congestive) heart failure (HCC) (Primary)    2. Permanent atrial fibrillation (HCC)    3. Chronic anticoagulation    4. Impaired mobility and ADLs    5. Chronic obstructive bronchitis (HCC)  -     Fluticasone-Umeclidin-Vilant (Trelegy Ellipta) 100-62.5-25 MCG/ACT inhaler; Inhale 1 puff Daily.  Dispense: 14 each; Refill: 0      Patient is provided a sample of Trelegy inhaler today.  She will begin with 1 inhalation daily.  I will plan to see her back in 14 days for reevaluation; she can return to the clinic or notify the office sooner should she develop any ill effects to the new medication.    Continue low-sodium/salt dietary intake.    Vital signs demonstrate hemodynamic stability.  Blood pressure is at goal, heart rate well controlled.  Continue anticoagulation.    Demonstrates no findings of acute volume overload at this time.     Continue fall precautions, as well as use of Rollator to aid in mobilization.      Discussion Summary:    Discussed plan of care in detail with pt today; pt verb understanding and agrees.    I spent 30 minutes caring for Millie on this date of service. This time includes time spent by me in the following activities:preparing for the visit, performing a medically appropriate examination and/or evaluation , counseling and educating the patient/family/caregiver, ordering medications, tests, or procedures, documenting information in the medical record and care coordination    I have reviewed and updated all copied forward information, as appropriate.  I attest to the accuracy and relevance of  any unchanged information.    Follow up:  No follow-ups on file.     There are no Patient Instructions on file for this visit.    Rajan Luis,   12/20/22  18:39 EST

## 2022-12-22 LAB
BACTERIA UR CULT: ABNORMAL
OTHER ANTIBIOTIC SUSC ISLT: ABNORMAL

## 2022-12-22 RX ORDER — CIPROFLOXACIN 750 MG/1
750 TABLET, FILM COATED ORAL 2 TIMES DAILY
Qty: 14 TABLET | Refills: 0 | Status: SHIPPED | OUTPATIENT
Start: 2022-12-22 | End: 2022-12-29

## 2022-12-24 DIAGNOSIS — F33.9 MAJOR DEPRESSION, RECURRENT, CHRONIC: ICD-10-CM

## 2022-12-24 DIAGNOSIS — K21.9 GASTROESOPHAGEAL REFLUX DISEASE WITHOUT ESOPHAGITIS: Chronic | ICD-10-CM

## 2022-12-24 DIAGNOSIS — I48.19 ATRIAL FIBRILLATION, PERSISTENT: ICD-10-CM

## 2022-12-24 DIAGNOSIS — I10 ESSENTIAL HYPERTENSION: ICD-10-CM

## 2022-12-27 RX ORDER — METOPROLOL SUCCINATE 200 MG/1
TABLET, EXTENDED RELEASE ORAL
Qty: 50 TABLET | Refills: 1 | Status: SHIPPED | OUTPATIENT
Start: 2022-12-27 | End: 2023-03-10

## 2022-12-27 RX ORDER — BUPROPION HYDROCHLORIDE 300 MG/1
TABLET ORAL
Qty: 30 TABLET | Refills: 1 | Status: SHIPPED | OUTPATIENT
Start: 2022-12-27 | End: 2023-03-10

## 2022-12-27 RX ORDER — PANTOPRAZOLE SODIUM 40 MG/1
TABLET, DELAYED RELEASE ORAL
Qty: 90 TABLET | Refills: 1 | Status: SHIPPED | OUTPATIENT
Start: 2022-12-27

## 2022-12-27 RX ORDER — LISINOPRIL 40 MG/1
TABLET ORAL
Qty: 90 TABLET | Refills: 1 | Status: SHIPPED | OUTPATIENT
Start: 2022-12-27

## 2022-12-27 RX ORDER — POTASSIUM CHLORIDE 750 MG/1
TABLET, FILM COATED, EXTENDED RELEASE ORAL
Qty: 30 TABLET | Refills: 1 | Status: SHIPPED | OUTPATIENT
Start: 2022-12-27 | End: 2023-03-10

## 2023-01-03 RX ORDER — CIPROFLOXACIN 250 MG/1
250 TABLET, FILM COATED ORAL 2 TIMES DAILY
COMMUNITY
End: 2023-01-03 | Stop reason: SDUPTHER

## 2023-01-03 NOTE — TELEPHONE ENCOUNTER
Caller: DylanMillie    Relationship: Self    Best call back number: [phone]  Requested Prescriptions:   Requested Prescriptions     Pending Prescriptions Disp Refills   • ciprofloxacin (CIPRO) 250 MG tablet       Sig: Take 1 tablet by mouth 2 (Two) Times a Day.        Pharmacy where request should be sent:  [facility] - [place], [place] - [address] - [phone]  - [phone] FX        Additional details provided by patient: PATIENT SAID UTI IS STILL NOT OVER AND WOULD LIKE A REFILL     Does the patient have less than a 3 day supply:  [x] Yes  [] No    Would you like a call back once the refill request has been completed: [x] Yes [] No    If the office needs to give you a call back, can they leave a voicemail: [x] Yes [] No    Joann Gloria Rep   01/03/23 14:08 EST

## 2023-01-06 RX ORDER — CIPROFLOXACIN 250 MG/1
250 TABLET, FILM COATED ORAL 2 TIMES DAILY
Qty: 10 TABLET | Refills: 0 | Status: SHIPPED | OUTPATIENT
Start: 2023-01-06 | End: 2023-01-11

## 2023-01-09 DIAGNOSIS — F51.04 PSYCHOPHYSIOLOGICAL INSOMNIA: ICD-10-CM

## 2023-01-09 RX ORDER — AMITRIPTYLINE HYDROCHLORIDE 25 MG/1
TABLET, FILM COATED ORAL
Qty: 60 TABLET | Refills: 3 | Status: SHIPPED | OUTPATIENT
Start: 2023-01-09

## 2023-01-18 ENCOUNTER — TELEPHONE (OUTPATIENT)
Dept: PREADMISSION TESTING | Facility: HOSPITAL | Age: 78
End: 2023-01-18

## 2023-01-19 ENCOUNTER — PRE-ADMISSION TESTING (OUTPATIENT)
Dept: PREADMISSION TESTING | Facility: HOSPITAL | Age: 78
End: 2023-01-19
Payer: MEDICARE

## 2023-01-19 VITALS — BODY MASS INDEX: 27.28 KG/M2 | HEIGHT: 68 IN | WEIGHT: 180 LBS

## 2023-01-19 LAB
ANION GAP SERPL CALCULATED.3IONS-SCNC: 8.4 MMOL/L (ref 5–15)
BUN SERPL-MCNC: 18 MG/DL (ref 8–23)
BUN/CREAT SERPL: 19.6 (ref 7–25)
CALCIUM SPEC-SCNC: 9.5 MG/DL (ref 8.6–10.5)
CHLORIDE SERPL-SCNC: 104 MMOL/L (ref 98–107)
CO2 SERPL-SCNC: 26.6 MMOL/L (ref 22–29)
CREAT SERPL-MCNC: 0.92 MG/DL (ref 0.57–1)
DEPRECATED RDW RBC AUTO: 47.5 FL (ref 37–54)
EGFRCR SERPLBLD CKD-EPI 2021: 63.5 ML/MIN/1.73
ERYTHROCYTE [DISTWIDTH] IN BLOOD BY AUTOMATED COUNT: 14.1 % (ref 12.3–15.4)
GLUCOSE SERPL-MCNC: 94 MG/DL (ref 65–99)
HCT VFR BLD AUTO: 40.6 % (ref 34–46.6)
HGB BLD-MCNC: 13 G/DL (ref 12–15.9)
MCH RBC QN AUTO: 29.4 PG (ref 26.6–33)
MCHC RBC AUTO-ENTMCNC: 32 G/DL (ref 31.5–35.7)
MCV RBC AUTO: 91.9 FL (ref 79–97)
PLATELET # BLD AUTO: 235 10*3/MM3 (ref 140–450)
PMV BLD AUTO: 11.7 FL (ref 6–12)
POTASSIUM SERPL-SCNC: 4.9 MMOL/L (ref 3.5–5.2)
RBC # BLD AUTO: 4.42 10*6/MM3 (ref 3.77–5.28)
SODIUM SERPL-SCNC: 139 MMOL/L (ref 136–145)
WBC NRBC COR # BLD: 5.57 10*3/MM3 (ref 3.4–10.8)

## 2023-01-19 PROCEDURE — 85027 COMPLETE CBC AUTOMATED: CPT

## 2023-01-19 PROCEDURE — 80048 BASIC METABOLIC PNL TOTAL CA: CPT

## 2023-01-19 PROCEDURE — 36415 COLL VENOUS BLD VENIPUNCTURE: CPT

## 2023-01-23 ENCOUNTER — ANESTHESIA EVENT (OUTPATIENT)
Dept: PERIOP | Facility: HOSPITAL | Age: 78
End: 2023-01-23
Payer: MEDICARE

## 2023-01-23 ENCOUNTER — ANESTHESIA (OUTPATIENT)
Dept: PERIOP | Facility: HOSPITAL | Age: 78
End: 2023-01-23
Payer: MEDICARE

## 2023-01-23 ENCOUNTER — HOSPITAL ENCOUNTER (OUTPATIENT)
Facility: HOSPITAL | Age: 78
Setting detail: HOSPITAL OUTPATIENT SURGERY
Discharge: HOME | End: 2023-01-23
Attending: OPHTHALMOLOGY | Admitting: OPHTHALMOLOGY
Payer: MEDICARE

## 2023-01-23 VITALS
RESPIRATION RATE: 18 BRPM | TEMPERATURE: 96.8 F | SYSTOLIC BLOOD PRESSURE: 109 MMHG | HEART RATE: 61 BPM | OXYGEN SATURATION: 95 % | DIASTOLIC BLOOD PRESSURE: 60 MMHG

## 2023-01-23 PROCEDURE — 25010000002 PROPOFOL 10 MG/ML EMULSION: Performed by: NURSE ANESTHETIST, CERTIFIED REGISTERED

## 2023-01-23 PROCEDURE — V2632 POST CHMBR INTRAOCULAR LENS: HCPCS | Performed by: OPHTHALMOLOGY

## 2023-01-23 PROCEDURE — 25010000002 EPINEPHRINE PER 0.1 MG: Performed by: OPHTHALMOLOGY

## 2023-01-23 PROCEDURE — 25010000002 MIDAZOLAM PER 1MG: Performed by: NURSE ANESTHETIST, CERTIFIED REGISTERED

## 2023-01-23 DEVICE — IMPLANTABLE DEVICE: Type: IMPLANTABLE DEVICE | Site: EYE | Status: FUNCTIONAL

## 2023-01-23 RX ORDER — POLYMYXIN B SULFATE AND TRIMETHOPRIM 1; 10000 MG/ML; [USP'U]/ML
1 SOLUTION OPHTHALMIC SEE ADMIN INSTRUCTIONS
Status: DISCONTINUED | OUTPATIENT
Start: 2023-01-23 | End: 2023-01-23 | Stop reason: HOSPADM

## 2023-01-23 RX ORDER — CYCLOPENT/TROPIC/PHEN/KETR/WAT 1%-1%-2.5%
1 DROPS (EA) OPHTHALMIC (EYE)
Status: COMPLETED | OUTPATIENT
Start: 2023-01-23 | End: 2023-01-23

## 2023-01-23 RX ORDER — POLYMYXIN B SULFATE AND TRIMETHOPRIM 1; 10000 MG/ML; [USP'U]/ML
SOLUTION OPHTHALMIC AS NEEDED
Status: DISCONTINUED | OUTPATIENT
Start: 2023-01-23 | End: 2023-01-23 | Stop reason: HOSPADM

## 2023-01-23 RX ORDER — MIDAZOLAM HYDROCHLORIDE 2 MG/2ML
INJECTION, SOLUTION INTRAMUSCULAR; INTRAVENOUS AS NEEDED
Status: DISCONTINUED | OUTPATIENT
Start: 2023-01-23 | End: 2023-01-23 | Stop reason: SURG

## 2023-01-23 RX ORDER — PROPOFOL 10 MG/ML
VIAL (ML) INTRAVENOUS AS NEEDED
Status: DISCONTINUED | OUTPATIENT
Start: 2023-01-23 | End: 2023-01-23 | Stop reason: SURG

## 2023-01-23 RX ORDER — PREDNISOLONE ACETATE 10 MG/ML
1 SUSPENSION/ DROPS OPHTHALMIC SEE ADMIN INSTRUCTIONS
Status: DISCONTINUED | OUTPATIENT
Start: 2023-01-23 | End: 2023-01-23 | Stop reason: HOSPADM

## 2023-01-23 RX ORDER — BALANCED SALT SOLUTION 6.4; .75; .48; .3; 3.9; 1.7 MG/ML; MG/ML; MG/ML; MG/ML; MG/ML; MG/ML
SOLUTION OPHTHALMIC AS NEEDED
Status: DISCONTINUED | OUTPATIENT
Start: 2023-01-23 | End: 2023-01-23 | Stop reason: HOSPADM

## 2023-01-23 RX ORDER — TETRACAINE HYDROCHLORIDE 5 MG/ML
SOLUTION OPHTHALMIC AS NEEDED
Status: DISCONTINUED | OUTPATIENT
Start: 2023-01-23 | End: 2023-01-23 | Stop reason: HOSPADM

## 2023-01-23 RX ORDER — PREDNISOLONE ACETATE 10 MG/ML
SUSPENSION/ DROPS OPHTHALMIC AS NEEDED
Status: DISCONTINUED | OUTPATIENT
Start: 2023-01-23 | End: 2023-01-23 | Stop reason: HOSPADM

## 2023-01-23 RX ORDER — SODIUM CHLORIDE, SODIUM LACTATE, POTASSIUM CHLORIDE, CALCIUM CHLORIDE 600; 310; 30; 20 MG/100ML; MG/100ML; MG/100ML; MG/100ML
1000 INJECTION, SOLUTION INTRAVENOUS CONTINUOUS
Status: DISCONTINUED | OUTPATIENT
Start: 2023-01-23 | End: 2023-01-23 | Stop reason: HOSPADM

## 2023-01-23 RX ORDER — TETRACAINE HYDROCHLORIDE 5 MG/ML
1 SOLUTION OPHTHALMIC SEE ADMIN INSTRUCTIONS
Status: COMPLETED | OUTPATIENT
Start: 2023-01-23 | End: 2023-01-23

## 2023-01-23 RX ORDER — KETAMINE HYDROCHLORIDE 50 MG/ML
INJECTION, SOLUTION, CONCENTRATE INTRAMUSCULAR; INTRAVENOUS AS NEEDED
Status: DISCONTINUED | OUTPATIENT
Start: 2023-01-23 | End: 2023-01-23 | Stop reason: SURG

## 2023-01-23 RX ORDER — SODIUM CHLORIDE 0.9 % (FLUSH) 0.9 %
10 SYRINGE (ML) INJECTION AS NEEDED
Status: DISCONTINUED | OUTPATIENT
Start: 2023-01-23 | End: 2023-01-23 | Stop reason: HOSPADM

## 2023-01-23 RX ADMIN — SODIUM CHLORIDE, POTASSIUM CHLORIDE, SODIUM LACTATE AND CALCIUM CHLORIDE 1000 ML: 600; 310; 30; 20 INJECTION, SOLUTION INTRAVENOUS at 07:52

## 2023-01-23 RX ADMIN — Medication 1 DROP: at 07:41

## 2023-01-23 RX ADMIN — TETRACAINE HYDROCHLORIDE 1 DROP: 5 SOLUTION OPHTHALMIC at 07:39

## 2023-01-23 RX ADMIN — Medication 1 DROP: at 07:46

## 2023-01-23 RX ADMIN — MIDAZOLAM HYDROCHLORIDE 1 MG: 1 INJECTION, SOLUTION INTRAMUSCULAR; INTRAVENOUS at 08:33

## 2023-01-23 RX ADMIN — PROPOFOL 5 MG: 10 INJECTION, EMULSION INTRAVENOUS at 08:37

## 2023-01-23 RX ADMIN — PROPOFOL 5 MG: 10 INJECTION, EMULSION INTRAVENOUS at 08:46

## 2023-01-23 RX ADMIN — TETRACAINE HYDROCHLORIDE 1 DROP: 5 SOLUTION OPHTHALMIC at 07:40

## 2023-01-23 RX ADMIN — TETRACAINE HYDROCHLORIDE 1 DROP: 5 SOLUTION OPHTHALMIC at 07:38

## 2023-01-23 RX ADMIN — Medication 1 DROP: at 07:51

## 2023-01-23 RX ADMIN — KETAMINE HYDROCHLORIDE 10 MG: 50 INJECTION, SOLUTION INTRAMUSCULAR; INTRAVENOUS at 08:33

## 2023-01-23 RX ADMIN — KETAMINE HYDROCHLORIDE 10 MG: 50 INJECTION, SOLUTION INTRAMUSCULAR; INTRAVENOUS at 08:28

## 2023-01-23 RX ADMIN — MIDAZOLAM HYDROCHLORIDE 1 MG: 1 INJECTION, SOLUTION INTRAMUSCULAR; INTRAVENOUS at 08:28

## 2023-01-23 NOTE — ANESTHESIA POSTPROCEDURE EVALUATION
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          Post Anesthesia Care and Evaluation    Patient location during evaluation: bedside  Patient participation: complete - patient participated  Level of consciousness: awake  Pain score: 0  Pain management: adequate    Airway patency: patent  Anesthetic complications: No anesthetic complications  PONV Status: controlled  Cardiovascular status: acceptable and stable  Respiratory status: acceptable and room air  Hydration status: acceptable    Comments: See nursing documentation for post op vital signs

## 2023-01-23 NOTE — OP NOTE
PROCEDURE NOTE      Date of Procedure: 1/23/2023  Patient Name:  Millie Richardson  MRN: [id number]  YOB: 1945      PREOPERATIVE DIAGNOSIS:  Visually significant combined form of senile cataract of the Right eye interfering with activities of daily living. Known ARMD.    INDICATIONS FOR THE PROCEDURE:  Visually significant combined form of senile cataract of the Right eye interfering with activities of daily living.    POSTOPERATIVE DIAGNOSIS:  Visually significant combined form of senile cataract of the Right eye interfering with activities of daily living.Hard nucleus.Tolerated IV sedation and topical better than expected.    SURGEON:  Veronica Lind M.D.    NAME OF PROCEDURE:  Right cataract extraction with posterior chamber intraocular lens implant. Lens used: +   Implant Name Type Inv. Item Serial No.  Lot No. LRB No. Used Action   LENS MONOFOCAL IQ [id number] - [id number] - [id number] Implant LENS MONOFOCAL IQ [id number]  Right 1 Implanted     CDE: 27.67    ANESTHESIA:  Topical with MAC.    COMPLICATIONS:  None.    ESTIMATED BLOOD LOSS:  Less than 1cc.    DETAILS OF THE PROCEDURE:  After receiving appropriate informed consent and confirming and marking the eye to be operated upon, the patient was wheeled into the operating room. After confirming adequate anesthesia, the patient was prepped and draped in a standard sterile ophthalmic fashion. A lid speculum was then placed in the eye.  A 0.8 mm metal blade was then used to make two limbal paracenteses and the anterior chamber was reformed with Viscoat.  A 2.4 mm metal keratome was then used to make a temporal clear cornea tunneled incision.  A cystotome was used to puncture the anterior capsule and initiate a capsular flap.  Utrata forceps were used to complete a continuous curvilinear capsulorrhexis.  BSS on a Sommer hydrodissection cannula was then used to hydrodissect and hydrodelineate the nucleus.  The nucleus was  then phacoemulsified using a divide and aconquer technique.  CDE was 27.67%.  Remaining cortex was removed with bimanual I/A.  Posterior capsular polish was performed.  The capsular bag was then reformed with Provisc and a   Implant Name Type Inv. Item Serial No.  Lot No. LRB No. Used Action   LENS MONOFOCAL IQ [id number] - [id number] - [id number] Implant LENS MONOFOCAL IQ [id number] [id number]  Right 1 Implanted     lens implant, was then placed in the bag without event.  The Provisc was then removed with bimanual irrigation and aspiration and the anterior chamber reformed with BSS.  The wounds were hydrated as necessary to maintain a water tight anterior chamber. Intracameral Moxifloxacin 1mg/0.1ml was administered and 5% povidone iodine solution was placed on the ocular surface. At the conclusion of the procedure, the lid speculum was removed and the patient undraped.  A drop of Polytrim and prednisolone acetate 1%  and shield were placed over the eye.  The patient left the room in good condition having tolerated the procedure well.    Veronica Lind MD

## 2023-01-23 NOTE — DISCHARGE INSTRUCTIONS
[facility] [address] [place] [address]  PH: [phone]  FAX: [phone]      Post - Operative Instructions for Dr. Lind's Cataract Patients      1.  Use your drops as prescribed, wait 5 minutes between each drop.  Eye drops are to be used as instructed during the daytime only. Do not get up in the night to use eye drops.  2.  Securely tape the protective shield over the operated eye at bedtime for the FIRST week only.  3.  Take your regular non-eye medications and continue any eye medications for the  non-operative eye.  4.  For the first week, avoid bending or stooping and lifting anything heavier than 5 pounds.  Also, avoid any blow to the head or eye.  Avoid getting dirty water in the eye.  Avoid sleeping on the side of the operated eye or face.  5.  No driving until you are told to by your physician.  6.  If significant eye pain is not relieved by medication, or you have increased redness, swelling, pain or loss of vision or any symptoms you are unsure of call Dr. Lind's office at [phone].      You should expect:    Slight Discomfort  Burning When Using Eye Drops  Blurred Vision and Dilated Pupil  Mild Redness  You Will Not Be Able To Read  Your Glasses May Not Work      PHYSICIAN TO CHOOSE THE APPROPRIATE MEDICATIONS TO BE SENT HOME WITH PATIENT    __x__  Prednisolone Acetate 1% (Pred Forte) ophthalmic solution    ____   Difluprednate (Durezol) .05% ophthalmic emulsion    ____   Vigamox(moxifloxacin) 0.5% ophthalmic solution    __x__   Polymyxin B/Trimethoprim solution    ____   Bacitracin Zinc and polymyxin B sulfate ophthalmic ointment    Start the following eyedrops today as soon as you are home:     Prednisolone Acetate 1% (Pred Forte) ophthalmic solution instill one drop every 2 hours  Polytrim instill one drop every 4 hours    Wait 5 mins between drops. Eye drops are to be used as instructed during the daytime only. Do not get up in the  night to use eye drops.    You may only remove the eye shield to administer eye medications (place shield back over eye after each medication administration); otherwise keep shield on at all times until office visit tomorrow.       Rest today  No pushing,pulling,tugging,heavy lifting, or strenuous activity   No major decision making,driving,or drinking alcoholic beverages for 24 hours due to the sedation you received  Always use good hand hygiene/washing technique  No driving on pain medication.     To assist you in voiding:  Drink plenty of fluids  Listen to running water while attempting to void.    If you are unable to urinate and you have an uncomfortable urge to void or it has been   6 hours since you were discharged, return to the Emergency Room

## 2023-02-16 ENCOUNTER — TELEPHONE (OUTPATIENT)
Dept: FAMILY MEDICINE CLINIC | Facility: CLINIC | Age: 78
End: 2023-02-16
Payer: MEDICARE

## 2023-02-16 DIAGNOSIS — Z79.01 CHRONIC ANTICOAGULATION: ICD-10-CM

## 2023-02-16 RX ORDER — APIXABAN 5 MG/1
5 TABLET, FILM COATED ORAL 2 TIMES DAILY
Qty: 180 TABLET | Refills: 0 | Status: SHIPPED | OUTPATIENT
Start: 2023-02-16

## 2023-02-16 NOTE — TELEPHONE ENCOUNTER
Caller: DylanMillie    Relationship: Self    Best call back number: [phone]    Requested Prescriptions:   Requested Prescriptions      No prescriptions requested or ordered in this encounter      Eliquis 5 MG tablet tablet    Pharmacy where request should be sent:      [facility] - [place], [place] - [address] - [phone]  - [phone] FX     Additional details provided by patient:     COMPLETELY OUT OF MEDICATION    Does the patient have less than a 3 day supply:  [x] Yes  [] No    Would you like a call back once the refill request has been completed: [] Yes [x] No    If the office needs to give you a call back, can they leave a voicemail: [] Yes [x] No    Heena Munguia, PCT   02/16/23 13:09 EST

## 2023-02-27 ENCOUNTER — ANESTHESIA EVENT (OUTPATIENT)
Dept: PERIOP | Facility: HOSPITAL | Age: 78
End: 2023-02-27
Payer: MEDICARE

## 2023-02-27 ENCOUNTER — ANESTHESIA (OUTPATIENT)
Dept: PERIOP | Facility: HOSPITAL | Age: 78
End: 2023-02-27
Payer: MEDICARE

## 2023-02-27 ENCOUNTER — HOSPITAL ENCOUNTER (OUTPATIENT)
Facility: HOSPITAL | Age: 78
Setting detail: HOSPITAL OUTPATIENT SURGERY
Discharge: HOME | End: 2023-02-27
Attending: OPHTHALMOLOGY | Admitting: OPHTHALMOLOGY
Payer: MEDICARE

## 2023-02-27 VITALS
HEART RATE: 60 BPM | RESPIRATION RATE: 17 BRPM | BODY MASS INDEX: 27.28 KG/M2 | SYSTOLIC BLOOD PRESSURE: 140 MMHG | WEIGHT: 180 LBS | DIASTOLIC BLOOD PRESSURE: 66 MMHG | TEMPERATURE: 97.1 F | OXYGEN SATURATION: 95 % | HEIGHT: 68 IN

## 2023-02-27 PROCEDURE — V2632 POST CHMBR INTRAOCULAR LENS: HCPCS | Performed by: OPHTHALMOLOGY

## 2023-02-27 PROCEDURE — 25010000002 MIDAZOLAM PER 1MG: Performed by: NURSE ANESTHETIST, CERTIFIED REGISTERED

## 2023-02-27 PROCEDURE — 25010000002 EPINEPHRINE PER 0.1 MG: Performed by: OPHTHALMOLOGY

## 2023-02-27 DEVICE — IMPLANTABLE DEVICE: Type: IMPLANTABLE DEVICE | Site: POSTERIOR CHAMBER | Status: FUNCTIONAL

## 2023-02-27 RX ORDER — POLYMYXIN B SULFATE AND TRIMETHOPRIM 1; 10000 MG/ML; [USP'U]/ML
1 SOLUTION OPHTHALMIC SEE ADMIN INSTRUCTIONS
Status: DISCONTINUED | OUTPATIENT
Start: 2023-02-27 | End: 2023-02-27 | Stop reason: HOSPADM

## 2023-02-27 RX ORDER — SODIUM CHLORIDE, SODIUM LACTATE, POTASSIUM CHLORIDE, CALCIUM CHLORIDE 600; 310; 30; 20 MG/100ML; MG/100ML; MG/100ML; MG/100ML
1000 INJECTION, SOLUTION INTRAVENOUS CONTINUOUS
Status: DISCONTINUED | OUTPATIENT
Start: 2023-02-27 | End: 2023-02-27 | Stop reason: HOSPADM

## 2023-02-27 RX ORDER — TETRACAINE HYDROCHLORIDE 5 MG/ML
1 SOLUTION OPHTHALMIC SEE ADMIN INSTRUCTIONS
Status: COMPLETED | OUTPATIENT
Start: 2023-02-27 | End: 2023-02-27

## 2023-02-27 RX ORDER — BALANCED SALT SOLUTION 6.4; .75; .48; .3; 3.9; 1.7 MG/ML; MG/ML; MG/ML; MG/ML; MG/ML; MG/ML
SOLUTION OPHTHALMIC AS NEEDED
Status: DISCONTINUED | OUTPATIENT
Start: 2023-02-27 | End: 2023-02-27 | Stop reason: HOSPADM

## 2023-02-27 RX ORDER — LIDOCAINE HYDROCHLORIDE 20 MG/ML
INJECTION, SOLUTION INTRAVENOUS AS NEEDED
Status: DISCONTINUED | OUTPATIENT
Start: 2023-02-27 | End: 2023-02-27 | Stop reason: SURG

## 2023-02-27 RX ORDER — TETRACAINE HYDROCHLORIDE 5 MG/ML
SOLUTION OPHTHALMIC AS NEEDED
Status: DISCONTINUED | OUTPATIENT
Start: 2023-02-27 | End: 2023-02-27 | Stop reason: HOSPADM

## 2023-02-27 RX ORDER — KETAMINE HYDROCHLORIDE 50 MG/ML
INJECTION, SOLUTION, CONCENTRATE INTRAMUSCULAR; INTRAVENOUS AS NEEDED
Status: DISCONTINUED | OUTPATIENT
Start: 2023-02-27 | End: 2023-02-27 | Stop reason: SURG

## 2023-02-27 RX ORDER — POLYMYXIN B SULFATE AND TRIMETHOPRIM 1; 10000 MG/ML; [USP'U]/ML
SOLUTION OPHTHALMIC AS NEEDED
Status: DISCONTINUED | OUTPATIENT
Start: 2023-02-27 | End: 2023-02-27 | Stop reason: HOSPADM

## 2023-02-27 RX ORDER — PREDNISOLONE ACETATE 10 MG/ML
SUSPENSION/ DROPS OPHTHALMIC AS NEEDED
Status: DISCONTINUED | OUTPATIENT
Start: 2023-02-27 | End: 2023-02-27 | Stop reason: HOSPADM

## 2023-02-27 RX ORDER — MIDAZOLAM HYDROCHLORIDE 2 MG/2ML
INJECTION, SOLUTION INTRAMUSCULAR; INTRAVENOUS AS NEEDED
Status: DISCONTINUED | OUTPATIENT
Start: 2023-02-27 | End: 2023-02-27 | Stop reason: SURG

## 2023-02-27 RX ORDER — CYCLOPENT/TROPIC/PHEN/KETR/WAT 1%-1%-2.5%
1 DROPS (EA) OPHTHALMIC (EYE)
Status: COMPLETED | OUTPATIENT
Start: 2023-02-27 | End: 2023-02-27

## 2023-02-27 RX ORDER — PREDNISOLONE ACETATE 10 MG/ML
1 SUSPENSION/ DROPS OPHTHALMIC SEE ADMIN INSTRUCTIONS
Status: DISCONTINUED | OUTPATIENT
Start: 2023-02-27 | End: 2023-02-27 | Stop reason: HOSPADM

## 2023-02-27 RX ADMIN — KETAMINE HYDROCHLORIDE 10 MG: 50 INJECTION, SOLUTION INTRAMUSCULAR; INTRAVENOUS at 09:05

## 2023-02-27 RX ADMIN — KETAMINE HYDROCHLORIDE 10 MG: 50 INJECTION, SOLUTION INTRAMUSCULAR; INTRAVENOUS at 09:15

## 2023-02-27 RX ADMIN — TETRACAINE HYDROCHLORIDE 1 DROP: 5 SOLUTION OPHTHALMIC at 08:21

## 2023-02-27 RX ADMIN — MIDAZOLAM HYDROCHLORIDE 2 MG: 1 INJECTION, SOLUTION INTRAMUSCULAR; INTRAVENOUS at 09:03

## 2023-02-27 RX ADMIN — Medication 1 DROP: at 08:22

## 2023-02-27 RX ADMIN — Medication 1 DROP: at 08:32

## 2023-02-27 RX ADMIN — SODIUM CHLORIDE, POTASSIUM CHLORIDE, SODIUM LACTATE AND CALCIUM CHLORIDE 1000 ML: 600; 310; 30; 20 INJECTION, SOLUTION INTRAVENOUS at 08:37

## 2023-02-27 RX ADMIN — TETRACAINE HYDROCHLORIDE 1 DROP: 5 SOLUTION OPHTHALMIC at 08:19

## 2023-02-27 RX ADMIN — TETRACAINE HYDROCHLORIDE 1 DROP: 5 SOLUTION OPHTHALMIC at 08:20

## 2023-02-27 RX ADMIN — Medication 1 DROP: at 08:27

## 2023-02-27 RX ADMIN — LIDOCAINE HYDROCHLORIDE 60 MG: 20 INJECTION, SOLUTION INTRAVENOUS at 09:03

## 2023-02-27 NOTE — ANESTHESIA POSTPROCEDURE EVALUATION
Patient: Millie Richardson    Procedure Summary     Date: 02/27/23 Room / Location: [facility] OR 1 / [facility] OR    Anesthesia Start: 0901 Anesthesia Stop: 0928    Procedure: CATARACT PHACO EXTRACTION WITH INTRAOCULAR LENS IMPLANT LEFT (Left: Eye) Diagnosis:       Combined forms of age-related cataract of left eye      (Combined forms of age-related cataract of left eye [H25.812])    Surgeons: Veronica Lind MD Provider: Keith Ewing CRNA    Anesthesia Type: MAC ASA Status: 4          Anesthesia Type: MAC    Vitals  No vitals data found for the desired time range.          Post Anesthesia Care and Evaluation    Patient location during evaluation: bedside  Patient participation: complete - patient participated  Level of consciousness: awake  Pain score: 0  Pain management: adequate    Airway patency: patent  Anesthetic complications: No anesthetic complications  PONV Status: controlled  Cardiovascular status: acceptable and stable  Respiratory status: acceptable and room air  Hydration status: acceptable    Comments: See nursing documentation for post op vital signs

## 2023-02-27 NOTE — ANESTHESIA PREPROCEDURE EVALUATION
Anesthesia Evaluation     Patient summary reviewed and Nursing notes reviewed   history of anesthetic complications: PONV  NPO Solid Status: > 8 hours  NPO Liquid Status: > 8 hours           Airway   Mallampati: II  TM distance: >3 FB  Neck ROM: full  Possible difficult intubation  Dental - normal exam     Pulmonary - normal exam   (+) COPD, sleep apnea on CPAP,     ROS comment: Pulmonary HTN  Cardiovascular     PT is on anticoagulation therapy  Rhythm: irregular  Rate: normal    (+) hypertension, valvular problems/murmurs, past MI , dysrhythmias Atrial Fib,     ROS comment: Echo 2020  Interpretation Summary    1.  Normal left ventricular size and systolic function, LVEF 55-60%.  2.  Restrictive LV filling pattern with elevated left atrial pressure.  3.  Severe left atrial dilation.  4.  Normal left ventricular size and systolic function.  5.  Moderate mitral vegetation.  6.  Moderate tricuspid regurgitation.  7.  Severe pulmonary hypertension, RVSP 78 mm      Neuro/Psych  (+) headaches, dizziness/light headedness, numbness, psychiatric history,    GI/Hepatic/Renal/Endo    (+)  GERD, PUD,  thyroid problem hypothyroidism    Musculoskeletal     Abdominal  - normal exam    Abdomen: soft.  Bowel sounds: normal.   Substance History - negative use     OB/GYN negative ob/gyn ROS         Other   arthritis,                        Anesthesia Plan    ASA 4     MAC     (Risks and benefits discussed including risk of aspiration, recall and dental damage. All patient questions answered. Will continue with POC.)  intravenous induction     Anesthetic plan, risks, benefits, and alternatives have been provided, discussed and informed consent has been obtained with: patient.  Pre-procedure education provided      CODE STATUS:

## 2023-03-10 DIAGNOSIS — I48.19 ATRIAL FIBRILLATION, PERSISTENT: ICD-10-CM

## 2023-03-10 DIAGNOSIS — I10 ESSENTIAL HYPERTENSION: ICD-10-CM

## 2023-03-10 DIAGNOSIS — F33.9 MAJOR DEPRESSION, RECURRENT, CHRONIC: ICD-10-CM

## 2023-03-10 RX ORDER — METOPROLOL SUCCINATE 200 MG/1
TABLET, EXTENDED RELEASE ORAL
Qty: 50 TABLET | Refills: 0 | Status: SHIPPED | OUTPATIENT
Start: 2023-03-10 | End: 2023-03-14 | Stop reason: SDUPTHER

## 2023-03-10 RX ORDER — POTASSIUM CHLORIDE 750 MG/1
TABLET, FILM COATED, EXTENDED RELEASE ORAL
Qty: 30 TABLET | Refills: 0 | Status: SHIPPED | OUTPATIENT
Start: 2023-03-10 | End: 2023-04-04

## 2023-03-10 RX ORDER — AMLODIPINE BESYLATE 5 MG/1
TABLET ORAL
Qty: 90 TABLET | Refills: 0 | Status: SHIPPED | OUTPATIENT
Start: 2023-03-10

## 2023-03-10 RX ORDER — BUPROPION HYDROCHLORIDE 300 MG/1
TABLET ORAL
Qty: 30 TABLET | Refills: 0 | Status: SHIPPED | OUTPATIENT
Start: 2023-03-10 | End: 2023-04-04

## 2023-03-14 ENCOUNTER — OFFICE VISIT (OUTPATIENT)
Dept: FAMILY MEDICINE CLINIC | Facility: CLINIC | Age: 78
End: 2023-03-14
Payer: MEDICARE

## 2023-03-14 VITALS
SYSTOLIC BLOOD PRESSURE: 116 MMHG | BODY MASS INDEX: 25.61 KG/M2 | HEIGHT: 68 IN | HEART RATE: 59 BPM | TEMPERATURE: 97.1 F | OXYGEN SATURATION: 98 % | WEIGHT: 169 LBS | RESPIRATION RATE: 14 BRPM | DIASTOLIC BLOOD PRESSURE: 72 MMHG

## 2023-03-14 DIAGNOSIS — I50.32 CHRONIC DIASTOLIC (CONGESTIVE) HEART FAILURE: Primary | ICD-10-CM

## 2023-03-14 DIAGNOSIS — M48.02 DEGENERATIVE CERVICAL SPINAL STENOSIS: ICD-10-CM

## 2023-03-14 DIAGNOSIS — Z79.01 CHRONIC ANTICOAGULATION: ICD-10-CM

## 2023-03-14 DIAGNOSIS — I48.21 PERMANENT ATRIAL FIBRILLATION: ICD-10-CM

## 2023-03-14 DIAGNOSIS — E03.9 ACQUIRED HYPOTHYROIDISM: ICD-10-CM
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
## 2023-03-14 DIAGNOSIS — M47.22 CERVICAL RADICULOPATHY DUE TO DEGENERATIVE JOINT DISEASE OF SPINE: ICD-10-CM

## 2023-03-14 PROCEDURE — 99214 OFFICE O/P EST MOD 30 MIN: CPT | Performed by: FAMILY MEDICINE

## 2023-03-14 PROCEDURE — 3078F DIAST BP <80 MM HG: CPT | Performed by: FAMILY MEDICINE

## 2023-03-14 PROCEDURE — 3074F SYST BP LT 130 MM HG: CPT | Performed by: FAMILY MEDICINE

## 2023-03-14 RX ORDER — METOPROLOL SUCCINATE 100 MG/1
200 TABLET, EXTENDED RELEASE ORAL 2 TIMES DAILY
Qty: 180 TABLET | Refills: 1 | Status: SHIPPED | OUTPATIENT
Start: 2023-03-14

## 2023-03-14 NOTE — PROGRESS NOTES
"                      Established Patient        Chief Complaint:   Chief Complaint   Patient presents with   • Fall     Head injury        Millie Richardson is a 77 y.o. female    History of Present Illness:   Here today for evaluation of recent fall with no resultant significant injury.    Patient has a known history of chronic diastolic CHF, permanent atrial fibrillation, chronic anticoagulation, hypothyroidism, cervical radiculopathy and degenerative cervical spinal stenosis.    She reports standing for close to a minute in her kitchen prior to her fall.  She denies any seizure-like activity.  Denies any recent epistaxis or hemoptysis, no hematuria/dysuria.  Denies any BRB/BTS.    Has chronic cervical pain, as well as radiculopathy.  Denies any recent dietary changes, no reports of orthopnea.  Subjective     The following portions of the patient's history were reviewed and updated as appropriate: allergies, current medications, past family history, past medical history, past social history, past surgical history and problem list.    Allergies   Allergen Reactions   • Nitrous Oxide Swelling   • Penicillins Anaphylaxis and Swelling   • Procaine Nausea Only   • Sulfa Antibiotics Other (See Comments)     Patient states \"it made me want to go to bed, I didn't have a fever but I felt like I did\"        Review of Systems  1. Constitutional: Negative for fever. Negative for chills, diaphoresis; malaise/fatigue of chronic nature, no appreciable weight change of unintended nature.  2. HENT: No dysphagia; no changes to vision/hearing/smell/taste; no epistaxis  3. Eyes: Negative for redness and visual disturbance.   4. Respiratory: No hemoptysis or cough reported.  5. Cardiovascular: Negative for chest pain; periodic symptomatic palpitations.  6. Gastrointestinal: Negative for abdominal distention, abdominal pain and blood in stool.   7. Endocrine: Negative for cold intolerance and heat intolerance.   8. Genitourinary: Negative " "for difficulty urinating, dysuria and frequency.   9. Musculoskeletal: Chronic arthralgias, back pain and myalgias.  Patient does report easy fatigability.  10. Skin: No new rashes or lesions.  11. Neurological: Recent episode of syncope.  Generalized weakness of chronic nature.  12. Hematological: Negative for adenopathy. Does not bruise/bleed easily.   13. Psychiatric/Behavioral: Negative for confusion. The patient is not nervous/anxious.    Objective     Physical Exam   Vital Signs: /72   Pulse 59   Temp 97.1 °F (36.2 °C)   Resp 14   Ht 172.7 cm (68\")   Wt 76.7 kg (169 lb)   LMP  (LMP Unknown)   SpO2 98%   BMI 25.70 kg/m²   BMI is >= 25 and <30. (Overweight) The following options were offered after discussion;: weight loss educational material (shared in after visit summary), exercise counseling/recommendations and nutrition counseling/recommendations    General Appearance: alert, oriented x 3, no acute distress.  Pleasant and interactive during questioning/examination.  Skin: warm and dry.  Without jaundice.  HEENT: Atraumatic.  pupils round and reactive to light and accommodation, oral mucosa pink and moist.  Nares patent without epistaxis.  External auditory canals are patent tympanic membranes intact.  Neck: supple, no JVD, trachea midline.  No thyromegaly  Lungs: Audible air exchange noted all lung fields, unlabored breathing effort.  Prolonged expiratory phase bilaterally, rhonchus and referred upper airway congestion to bilateral lungs, cleared with light cough.  Heart: RRR, normal S1 and S2, no S3, no rub.  Chronic heart murmurs.  Abdomen: soft, non-tender, no palpable bladder, present bowel sounds to auscultation ×4.  No guarding or rigidity.  Extremities: no clubbing, cyanosis or edema.  Good range of motion actively and passively.  Symmetric muscle strength and development.  Ambulates with rollator.  Neuro: normal speech and mental status.  Cranial nerves II through XII intact.  No " anosmia. DTR 2+; proprioception intact.  No focal motor/sensory deficits.    Advance Care Planning   ACP discussion was held with the patient during this visit. Patient does not have an advance directive, information provided.       Assessment and Plan      Assessment/Plan:   Diagnoses and all orders for this visit:    1. Chronic diastolic (congestive) heart failure (HCC) (Primary)  -     CBC & Differential    2. Permanent atrial fibrillation (HCC)  -     CBC & Differential  -     metoprolol succinate XL (TOPROL-XL) 100 MG 24 hr tablet; Take 2 tablets by mouth 2 (Two) Times a Day.  Dispense: 180 tablet; Refill: 1    3. Chronic anticoagulation  -     CBC & Differential    4. Acquired hypothyroidism  -     TSH  -     T4, Free  -     T3, free  -     T3, Reverse    5. Cervical radiculopathy due to degenerative joint disease of spine  -     MRI Cervical Spine Without Contrast    6. Degenerative cervical spinal stenosis  -     MRI Cervical Spine Without Contrast      Needs dose adjustment of her metoprolol given most recent syncopal episode while standing and relative bradycardia; will dec dose to 100 mg bid and follow clinically; consider dec to 150 mg daily if still intolerant.    Updated MRI of cervical spine.    Thyroid function studies today; dose adjustment if indicated.    Surveillance CBC today.      Discussion Summary:    Discussed plan of care in detail with pt today; pt verb understanding and agrees.    I spent 30 minutes caring for Millie on this date of service. This time includes time spent by me in the following activities:preparing for the visit, reviewing tests, performing a medically appropriate examination and/or evaluation , counseling and educating the patient/family/caregiver, ordering medications, tests, or procedures, documenting information in the medical record and care coordination    I have reviewed and updated all copied forward information, as appropriate.  I attest to the accuracy and relevance  of any unchanged information.    Follow up:  Return in about 6 weeks (around 4/25/2023) for Recheck.     There are no Patient Instructions on file for this visit.    Rajan Luis DO  03/14/23  14:47 EDT

## 2023-03-18 LAB
BASOPHILS # BLD AUTO: 0.04 10*3/MM3 (ref 0–0.2)
BASOPHILS NFR BLD AUTO: 0.6 % (ref 0–1.5)
EOSINOPHIL # BLD AUTO: 0.34 10*3/MM3 (ref 0–0.4)
EOSINOPHIL NFR BLD AUTO: 5.3 % (ref 0.3–6.2)
ERYTHROCYTE [DISTWIDTH] IN BLOOD BY AUTOMATED COUNT: 13.9 % (ref 12.3–15.4)
HCT VFR BLD AUTO: 41.3 % (ref 34–46.6)
HGB BLD-MCNC: 13.4 G/DL (ref 12–15.9)
IMM GRANULOCYTES # BLD AUTO: 0.01 10*3/MM3 (ref 0–0.05)
IMM GRANULOCYTES NFR BLD AUTO: 0.2 % (ref 0–0.5)
LYMPHOCYTES # BLD AUTO: 2.15 10*3/MM3 (ref 0.7–3.1)
LYMPHOCYTES NFR BLD AUTO: 33.6 % (ref 19.6–45.3)
MCH RBC QN AUTO: 29 PG (ref 26.6–33)
MCHC RBC AUTO-ENTMCNC: 32.4 G/DL (ref 31.5–35.7)
MCV RBC AUTO: 89.4 FL (ref 79–97)
MONOCYTES # BLD AUTO: 0.41 10*3/MM3 (ref 0.1–0.9)
MONOCYTES NFR BLD AUTO: 6.4 % (ref 5–12)
NEUTROPHILS # BLD AUTO: 3.44 10*3/MM3 (ref 1.7–7)
NEUTROPHILS NFR BLD AUTO: 53.9 % (ref 42.7–76)
NRBC BLD AUTO-RTO: 0 /100 WBC (ref 0–0.2)
PLATELET # BLD AUTO: 222 10*3/MM3 (ref 140–450)
RBC # BLD AUTO: 4.62 10*6/MM3 (ref 3.77–5.28)
T3FREE SERPL-MCNC: 2.2 PG/ML (ref 2–4.4)
T3REVERSE SERPL-MCNC: 11.5 NG/DL (ref 9.2–24.1)
T4 FREE SERPL-MCNC: 0.87 NG/DL (ref 0.93–1.7)
TSH SERPL DL<=0.005 MIU/L-ACNC: 10.1 UIU/ML (ref 0.27–4.2)
WBC # BLD AUTO: 6.39 10*3/MM3 (ref 3.4–10.8)

## 2023-03-28 DIAGNOSIS — E03.9 ACQUIRED HYPOTHYROIDISM: Primary | ICD-10-CM

## 2023-04-04 DIAGNOSIS — I10 ESSENTIAL HYPERTENSION: ICD-10-CM

## 2023-04-04 DIAGNOSIS — F33.9 MAJOR DEPRESSION, RECURRENT, CHRONIC: ICD-10-CM

## 2023-04-04 RX ORDER — BUPROPION HYDROCHLORIDE 300 MG/1
TABLET ORAL
Qty: 30 TABLET | Refills: 0 | Status: SHIPPED | OUTPATIENT
Start: 2023-04-04

## 2023-04-04 RX ORDER — POTASSIUM CHLORIDE 750 MG/1
TABLET, FILM COATED, EXTENDED RELEASE ORAL
Qty: 30 TABLET | Refills: 0 | Status: SHIPPED | OUTPATIENT
Start: 2023-04-04

## 2023-04-11 ENCOUNTER — TELEPHONE (OUTPATIENT)
Dept: FAMILY MEDICINE CLINIC | Facility: CLINIC | Age: 78
End: 2023-04-11
Payer: MEDICARE

## 2023-04-11 NOTE — TELEPHONE ENCOUNTER
Caller: Millie Richardson    Relationship: Self    Best call back number: [phone]     What orders are you requesting (i.e. lab or imaging): MRI OF HEAD AND NECK  In what timeframe would the patient need to come in: ASAP  [facility]  Additional notes:CALL WHEN SET UP

## 2023-04-13 NOTE — TELEPHONE ENCOUNTER
PATIENT MADE CONTACT TO REQUEST A CALL BACK REGARDING ORDER FOR MRI    PATIENT STATED SHE NEEDS THE LOCATION FOR THE MRI AND A TELEPHONE NUMBER IF SHE IS TO CALL TO SCHEDULE THE MRI    PATIENT REQUESTED A CALL BACK AS SOON AS POSSIBLE SINCE SHE IS CONFUSED ABOUT THIS PROCEDURE

## 2023-04-13 NOTE — TELEPHONE ENCOUNTER
I have spoken to patient about her MRI. She is wanting to reschedule it but wants it at [facility]. Advised her that I would reach back out to her as soon as I have it scheduled for her.

## 2023-04-20 ENCOUNTER — TELEPHONE (OUTPATIENT)
Dept: CARDIOLOGY | Facility: CLINIC | Age: 78
End: 2023-04-20
Payer: MEDICARE

## 2023-04-20 NOTE — TELEPHONE ENCOUNTER
This was ordered by Dr. Luis.  I would have the patient follow-up with him.  When I last say her in the office in 10/22, she said she did not want to return to our clinic for follow-up.

## 2023-04-25 ENCOUNTER — OFFICE VISIT (OUTPATIENT)
Dept: FAMILY MEDICINE CLINIC | Facility: CLINIC | Age: 78
End: 2023-04-25
Payer: MEDICARE

## 2023-04-25 VITALS
DIASTOLIC BLOOD PRESSURE: 78 MMHG | TEMPERATURE: 97 F | HEIGHT: 68 IN | SYSTOLIC BLOOD PRESSURE: 120 MMHG | WEIGHT: 167 LBS | BODY MASS INDEX: 25.31 KG/M2 | RESPIRATION RATE: 16 BRPM | OXYGEN SATURATION: 99 % | HEART RATE: 60 BPM

## 2023-04-25 DIAGNOSIS — I50.32 CHRONIC DIASTOLIC (CONGESTIVE) HEART FAILURE: ICD-10-CM

## 2023-04-25 DIAGNOSIS — M17.0 TRICOMPARTMENT OSTEOARTHRITIS OF BOTH KNEES: ICD-10-CM

## 2023-04-25 DIAGNOSIS — Z78.9 IMPAIRED MOBILITY AND ADLS: ICD-10-CM

## 2023-04-25 DIAGNOSIS — Z00.00 MEDICARE ANNUAL WELLNESS VISIT, SUBSEQUENT: Primary | ICD-10-CM

## 2023-04-25 DIAGNOSIS — Z78.0 POSTMENOPAUSAL: ICD-10-CM

## 2023-04-25 DIAGNOSIS — N95.9 POSTMENOPAUSAL SYMPTOMS: ICD-10-CM

## 2023-04-25 DIAGNOSIS — Z74.09 IMPAIRED MOBILITY AND ADLS: ICD-10-CM

## 2023-04-25 DIAGNOSIS — Z13.820 SCREENING FOR OSTEOPOROSIS: ICD-10-CM

## 2023-04-25 NOTE — PATIENT INSTRUCTIONS
Advance Care Planning and Advance Directives     You make decisions on a daily basis - decisions about where you want to live, your career, your home, your life. Perhaps one of the most important decisions you face is your choice for future medical care. Take time to talk with your family and your healthcare team and start planning today.  Advance Care Planning is a process that can help you:  · Understand possible future healthcare decisions in light of your own experiences  · Reflect on those decision in light of your goals and values  · Discuss your decisions with those closest to you and the healthcare professionals that care for you  · Make a plan by creating a document that reflects your wishes    Surrogate Decision Maker  In the event of a medical emergency, which has left you unable to communicate or to make your own decisions, you would need someone to make decisions for you.  It is important to discuss your preferences for medical treatment with this person while you are in good health.     Qualities of a surrogate decision maker:  • Willing to take on this role and responsibility  • Knows what you want for future medical care  • Willing to follow your wishes even if they don't agree with them  • Able to make difficult medical decisions under stressful circumstances    Advance Directives  These are legal documents you can create that will guide your healthcare team and decision maker(s) when needed. These documents can be stored in the electronic medical record.    · Living Will - a legal document to guide your care if you have a terminal condition or a serious illness and are unable to communicate. States vary by statute in document names/types, but most forms may include one or more of the following:        -  Directions regarding life-prolonging treatments        -  Directions regarding artificially provided nutrition/hydration        -  Choosing a healthcare decision maker        -  Direction  regarding organ/tissue donation    · Durable Power of  for Healthcare - this document names an -in-fact to make medical decisions for you, but it may also allow this person to make personal and financial decisions for you. Please seek the advice of an  if you need this type of document.    **Advance Directives are not required and no one may discriminate against you if you do not sign one.    Medical Orders  Many states allow specific forms/orders signed by your physician to record your wishes for medical treatment in your current state of health. This form, signed in personal communication with your physician, addresses resuscitation and other medical interventions that you may or may not want.      For more information or to schedule a time with a [facility] Advance Care Planning Facilitator contact: [facility] or call [phone] and someone will contact you directly.    Medicare Wellness  Personal Prevention Plan of Service     Date of Office Visit:    Encounter Provider:  Rajan Luis DO  Place of Service:  [facility]  Patient Name: Millie Richardson  :  1945    As part of the Medicare Wellness portion of your visit today, we are providing you with this personalized preventive plan of services (PPPS). This plan is based upon recommendations of the [place] Preventive Services Task Force (USPSTF) and the Advisory Committee on Immunization Practices (ACIP).    This lists the preventive care services that should be considered, and provides dates of when you are due. Items listed as completed are up-to-date and do not require any further intervention.    Health Maintenance   Topic Date Due   • DXA SCAN  Never done   • ZOSTER VACCINE (1 of 2) Never done   • ANNUAL WELLNESS VISIT  2023   • COVID-19 Vaccine (4 - Booster for Moderna series) 2023 (Originally 2022)   • INFLUENZA VACCINE  2023   • TDAP/TD  VACCINES (3 - Td or Tdap) 08/06/2028   • HEPATITIS C SCREENING  Completed   • Pneumococcal Vaccine 65+  Completed   • MAMMOGRAM  Discontinued   • COLORECTAL CANCER SCREENING  Discontinued       No orders of the defined types were placed in this encounter.      No follow-ups on file.

## 2023-04-25 NOTE — PROGRESS NOTES
The ABCs of the Annual Wellness Visit  Subsequent Medicare Wellness Visit    Subjective    Millie Richardson is a 77 y.o. female who presents for a Subsequent Medicare Wellness Visit.    Status post cataract procedure to bilateral eyes.      The following portions of the patient's history were reviewed and   updated as appropriate: allergies, current medications, past family history, past medical history, past social history, past surgical history and problem list.    Compared to one year ago, the patient feels her physical   health is the same.    Compared to one year ago, the patient feels her mental   health is the same.    Recent Hospitalizations:  She was not admitted to the hospital during the last year.       Current Medical Providers:  Patient Care Team:  Rajan Luis DO as PCP - General (Family Medicine)  Martínez Cintron MD as Consulting Physician (Cardiology)  Sheeba Shepherd MD as Consulting Physician (Pulmonary Disease)  Rebekah Crowe MD as Consulting Physician (Obstetrics and Gynecology)  Dana Espinoza APRN as Nurse Practitioner (Family Medicine)    Outpatient Medications Prior to Visit   Medication Sig Dispense Refill   • amitriptyline (ELAVIL) 25 MG tablet TAKE 1 TO 2 TABLETS BY MOUTH AT BEDTIME 60 tablet 3   • amLODIPine (NORVASC) 5 MG tablet TAKE ONE TABLET BY MOUTH ONCE DAILY 90 tablet 0   • Fluticasone-Umeclidin-Vilant (Trelegy Ellipta) 100-62.5-25 MCG/ACT inhaler Inhale 1 puff Daily. 14 each 0   • lisinopril (PRINIVIL,ZESTRIL) 40 MG tablet TAKE ONE TABLET BY MOUTH ONCE DAILY 90 tablet 1   • metoprolol succinate XL (TOPROL-XL) 100 MG 24 hr tablet Take 2 tablets by mouth 2 (Two) Times a Day. 180 tablet 1   • Misc. Devices (Bath/Shower Seat) misc 1 Units As Needed (bathing). 1 each 0   • Misc. Devices (Rollator Ultra-Light) misc 1 Units Daily. 1 each 0   • NP Thyroid 90 MG tablet TAKE ONE TABLET BY MOUTH ONCE DAILY 90 tablet 0   • pantoprazole (PROTONIX) 40 MG EC tablet TAKE ONE TABLET BY  MOUTH ONCE DAILY 90 tablet 1   • buPROPion XL (WELLBUTRIN XL) 300 MG 24 hr tablet TAKE ONE TABLET BY MOUTH ONCE DAILY 30 tablet 0   • Eliquis 5 MG tablet tablet Take 1 tablet by mouth 2 (Two) Times a Day. 180 tablet 0   • potassium chloride 10 MEQ CR tablet TAKE ONE TABLET BY MOUTH ONCE DAILY 30 tablet 0   • venlafaxine (EFFEXOR) 100 MG tablet TAKE ONE TABLET BY MOUTH TWICE DAILY 180 tablet 1     No facility-administered medications prior to visit.       No opioid medication identified on active medication list. I have reviewed chart for other potential  high risk medication/s and harmful drug interactions in the elderly.          Aspirin is not on active medication list.  Aspirin use is not indicated based on review of current medical condition/s. Risk of harm outweighs potential benefits.  .    Patient Active Problem List   Diagnosis   • Permanent atrial fibrillation (HCC)   • Essential hypertension   • History of myocardial infarction   • Acquired hypothyroidism   • Depression   • Blepharospasm   • Chronic diastolic (congestive) heart failure   • Pulmonary hypertension   • Gastroesophageal reflux disease without esophagitis   • Major depression, recurrent, chronic   • Generalized anxiety disorder   • Chronic anticoagulation   • Age-related osteoporosis without current pathological fracture   • Moderate mitral regurgitation   • Moderate tricuspid regurgitation   • Cervical radiculopathy due to degenerative joint disease of spine   • Tricompartment osteoarthritis of both knees   • Spastic torticollis   • Vertigo   • Autonomic instability   • Impaired mobility and ADLs   • Choroidal neovascular membrane of right eye   • Combined forms of age-related cataract of both eyes   • Keratoconjunctivitis sicca of both eyes not specified as Sjogren's   • Myopia of both eyes   • Vitamin B12 deficiency   • Sjogren's syndrome   • Bilateral degenerative progressive high myopia   • 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  ACP discussion was held with the patient during this visit. Patient does not have an advance directive, information provided.     Review of Systems  1. Constitutional: Negative for fever. Negative for chills, diaphoresis; malaise/fatigue of chronic nature, no appreciable weight change of unintended nature.  2. HENT: No dysphagia; no changes to vision/hearing/smell/taste; no epistaxis  3. Eyes: Negative for redness and visual disturbance.   4. Respiratory: No hemoptysis or cough reported.  5. Cardiovascular: Negative for chest pain; periodic symptomatic palpitations.  6. Gastrointestinal: Negative for abdominal distention, abdominal pain and blood in stool.   7. Endocrine: Negative for cold intolerance and heat intolerance.   8. Genitourinary: Negative for difficulty urinating, dysuria and frequency.   9. Musculoskeletal: Chronic arthralgias, back pain and myalgias.  Patient does report easy fatigability.  10. Skin: No new rashes or lesions.  11. Neurological: Recent episode of syncope.  Generalized weakness of chronic nature.  12. Hematological: Negative for adenopathy. Does not bruise/bleed easily.   13. Psychiatric/Behavioral: Negative for confusion. The patient is not nervous/anxious.       Objective    Vitals:    04/25/23 1400   BP: 120/78   Pulse: 60   Resp: 16   Temp: 97 °F (36.1 °C)   SpO2: 99%   Weight: 75.8 kg (167 lb)   Height: 172.7 cm (68\")     Estimated body mass index is 25.39 kg/m² as calculated from the following:    Height as of this encounter: 172.7 cm (68\").    Weight as of this encounter: 75.8 kg (167 lb).    BMI is >= 25 and <30. (Overweight) The following options were offered after discussion;: exercise counseling/recommendations and nutrition counseling/recommendations    General Appearance: alert, oriented x 3, " no acute distress.  Pleasant and interactive during questioning/examination.  Skin: warm and dry.  Without jaundice.  HEENT: Atraumatic.  pupils round and reactive to light and accommodation, oral mucosa pink and moist.  Nares patent without epistaxis.  External auditory canals are patent tympanic membranes intact.  Neck: supple, no JVD, trachea midline.  No thyromegaly  Lungs: Audible air exchange noted all lung fields, unlabored breathing effort.  Prolonged expiratory phase bilaterally, rhonchus and referred upper airway congestion to bilateral lungs, cleared with light cough.  Heart: RRR, normal S1 and S2, no S3, no rub.  Chronic heart murmurs.  Abdomen: soft, non-tender, no palpable bladder, present bowel sounds to auscultation ×4.  No guarding or rigidity.  Extremities: no clubbing, cyanosis or edema.  Good range of motion actively and passively.  Symmetric muscle strength and development.  Ambulates with rollator.  Neuro: normal speech and mental status.  Cranial nerves II through XII intact.  No anosmia. DTR 2+; proprioception intact.  No focal motor/sensory deficits.    Does the patient have evidence of cognitive impairment?   No            HEALTH RISK ASSESSMENT    Smoking Status:  Social History     Tobacco Use   Smoking Status Never   • Passive exposure: Never   Smokeless Tobacco Never     Alcohol Consumption:  Social History     Substance and Sexual Activity   Alcohol Use No     Fall Risk Screen:    STEADI Fall Risk Assessment was completed, and patient is at HIGH risk for falls. Assessment completed on:2023    Depression Screenin/25/2023     2:41 PM   PHQ-2/PHQ-9 Depression Screening   Little Interest or Pleasure in Doing Things 0-->not at all   Feeling Down, Depressed or Hopeless 1-->several days   PHQ-9: Brief Depression Severity Measure Score 1       Health Habits and Functional and Cognitive Screenin/25/2023     2:38 PM   Functional & Cognitive Status   Do you have difficulty  preparing food and eating? Yes   Do you have difficulty bathing yourself, getting dressed or grooming yourself? No   Do you have difficulty using the toilet? No   Do you have difficulty moving around from place to place? Yes   Do you have trouble with steps or getting out of a bed or a chair? Yes   Current Diet Unhealthy Diet   Dental Exam Up to date   Eye Exam Up to date   Exercise (times per week) 0 times per week   Do you need help using the phone?  No   Are you deaf or do you have serious difficulty hearing?  No   Do you need help with transportation? Yes   Do you need help shopping? Yes   Do you need help preparing meals?  Yes   Do you need help with housework?  Yes   Do you need help with laundry? Yes   Do you need help taking your medications? Yes   Do you need help managing money? No   Do you ever drive or ride in a car without wearing a seat belt? No   Have you felt unusual stress, anger or loneliness in the last month? No   Who do you live with? Alone   If you need help, do you have trouble finding someone available to you? No   Have you been bothered in the last four weeks by sexual problems? No   Do you have difficulty concentrating, remembering or making decisions? No       Age-appropriate Screening Schedule:  Refer to the list below for future screening recommendations based on patient's age, sex and/or medical conditions. Orders for these recommended tests are listed in the plan section. The patient has been provided with a written plan.    Health Maintenance   Topic Date Due   • DXA SCAN  Never done   • ZOSTER VACCINE (1 of 2) Never done   • COVID-19 Vaccine (4 - Booster for Moderna series) 12/27/2023 (Originally 5/2/2022)   • INFLUENZA VACCINE  08/01/2023   • ANNUAL WELLNESS VISIT  04/25/2024   • TDAP/TD VACCINES (3 - Td or Tdap) 08/06/2028   • HEPATITIS C SCREENING  Completed   • Pneumococcal Vaccine 65+  Completed   • MAMMOGRAM  Discontinued   • COLORECTAL CANCER SCREENING  Discontinued                   CMS Preventative Services Quick Reference  Risk Factors Identified During Encounter:    None Identified    The above risks/problems have been discussed with the patient.  Pertinent information has been shared with the patient in the After Visit Summary.    Diagnoses and all orders for this visit:    1. Medicare annual wellness visit, subsequent (Primary)    2. Chronic diastolic (congestive) heart failure    3. Tricompartment osteoarthritis of both knees    4. Impaired mobility and ADLs    5. Postmenopausal symptoms  -     Dexa Bone Density, Axial (Every 2 Years); Future    6. Screening for osteoporosis  -     Dexa Bone Density, Axial (Every 2 Years); Future    7. Postmenopausal  -     Dexa Bone Density, Axial (Every 2 Years); Future        Follow Up:   Next Medicare Wellness visit to be scheduled in 1 year.      An After Visit Summary and PPPS were made available to the patient.    I have discussed age/gender specific preventative healthcare issues in detail with patient today.  I have answered all of the questions.    Updated bone density study.    Vital signs demonstrate hemodynamic stability, no findings of acute volume overload.    Pain appears clinically well controlled.

## 2023-05-02 DIAGNOSIS — M54.12 CHRONIC CERVICAL RADICULOPATHY: ICD-10-CM

## 2023-05-02 DIAGNOSIS — M48.02 SPINAL STENOSIS, CERVICAL REGION: Primary | ICD-10-CM

## 2023-05-12 ENCOUNTER — TELEPHONE (OUTPATIENT)
Dept: FAMILY MEDICINE CLINIC | Facility: CLINIC | Age: 78
End: 2023-05-12

## 2023-05-12 DIAGNOSIS — F33.9 MAJOR DEPRESSION, RECURRENT, CHRONIC: ICD-10-CM

## 2023-05-12 DIAGNOSIS — I48.19 ATRIAL FIBRILLATION, PERSISTENT: ICD-10-CM

## 2023-05-12 DIAGNOSIS — F41.1 GENERALIZED ANXIETY DISORDER: ICD-10-CM

## 2023-05-12 DIAGNOSIS — I10 ESSENTIAL HYPERTENSION: ICD-10-CM

## 2023-05-12 DIAGNOSIS — Z79.01 CHRONIC ANTICOAGULATION: ICD-10-CM

## 2023-05-12 RX ORDER — METOPROLOL SUCCINATE 200 MG/1
TABLET, EXTENDED RELEASE ORAL
Qty: 50 TABLET | Refills: 0 | OUTPATIENT
Start: 2023-05-12

## 2023-05-12 RX ORDER — BUPROPION HYDROCHLORIDE 300 MG/1
TABLET ORAL
Qty: 30 TABLET | Refills: 0 | Status: SHIPPED | OUTPATIENT
Start: 2023-05-12

## 2023-05-12 RX ORDER — APIXABAN 5 MG/1
TABLET, FILM COATED ORAL
Qty: 180 TABLET | Refills: 0 | Status: SHIPPED | OUTPATIENT
Start: 2023-05-12

## 2023-05-12 RX ORDER — POTASSIUM CHLORIDE 750 MG/1
TABLET, FILM COATED, EXTENDED RELEASE ORAL
Qty: 30 TABLET | Refills: 0 | Status: SHIPPED | OUTPATIENT
Start: 2023-05-12

## 2023-05-12 RX ORDER — VENLAFAXINE 100 MG/1
TABLET ORAL
Qty: 180 TABLET | Refills: 1 | Status: SHIPPED | OUTPATIENT
Start: 2023-05-12

## 2023-05-12 NOTE — TELEPHONE ENCOUNTER
Caller: Millie Richardson    Relationship: Self    Best call back number:[phone]    What is the best time to reach you: AROUND NOON    Who are you requesting to speak with (clinical staff, provider,  specific staff member): CLINICAL STAFF      What was the call regarding: PATIENT WOULD LIKE THE INFORMATION OF THE DERMATOLOGIST THAT  PREVIOUSLY REFERRED HER TO      Do you require a callback:YES

## 2023-05-15 DIAGNOSIS — F51.04 PSYCHOPHYSIOLOGICAL INSOMNIA: ICD-10-CM

## 2023-05-15 NOTE — TELEPHONE ENCOUNTER
Were you planning on referring this patient to derm?    I did not see a current referral, wasn't sure if you discussed this at her last appt.

## 2023-05-16 RX ORDER — AMITRIPTYLINE HYDROCHLORIDE 25 MG/1
TABLET, FILM COATED ORAL
Qty: 60 TABLET | Refills: 3 | Status: SHIPPED | OUTPATIENT
Start: 2023-05-16

## 2023-05-19 DIAGNOSIS — I48.19 ATRIAL FIBRILLATION, PERSISTENT: ICD-10-CM

## 2023-05-19 RX ORDER — METOPROLOL SUCCINATE 200 MG/1
TABLET, EXTENDED RELEASE ORAL
Qty: 50 TABLET | Refills: 0 | Status: SHIPPED | OUTPATIENT
Start: 2023-05-19

## 2023-06-03 DIAGNOSIS — K21.9 GASTROESOPHAGEAL REFLUX DISEASE WITHOUT ESOPHAGITIS: Chronic | ICD-10-CM

## 2023-06-03 DIAGNOSIS — I10 ESSENTIAL HYPERTENSION: ICD-10-CM

## 2023-06-05 RX ORDER — PANTOPRAZOLE SODIUM 40 MG/1
TABLET, DELAYED RELEASE ORAL
Qty: 90 TABLET | Refills: 1 | Status: SHIPPED | OUTPATIENT
Start: 2023-06-05

## 2023-06-05 RX ORDER — LISINOPRIL 40 MG/1
TABLET ORAL
Qty: 90 TABLET | Refills: 1 | Status: SHIPPED | OUTPATIENT
Start: 2023-06-05

## 2023-08-02 DIAGNOSIS — F51.04 PSYCHOPHYSIOLOGICAL INSOMNIA: ICD-10-CM

## 2023-08-02 DIAGNOSIS — I10 ESSENTIAL HYPERTENSION: ICD-10-CM

## 2023-08-02 DIAGNOSIS — E03.9 ACQUIRED HYPOTHYROIDISM: ICD-10-CM

## 2023-08-02 RX ORDER — AMITRIPTYLINE HYDROCHLORIDE 25 MG/1
25-50 TABLET, FILM COATED ORAL
Qty: 60 TABLET | Refills: 3 | Status: SHIPPED | OUTPATIENT
Start: 2023-08-02

## 2023-08-02 RX ORDER — THYROID 90 MG/1
90 TABLET ORAL DAILY
Qty: 90 TABLET | Refills: 0 | Status: SHIPPED | OUTPATIENT
Start: 2023-08-02

## 2023-08-02 RX ORDER — LISINOPRIL 40 MG/1
40 TABLET ORAL DAILY
Qty: 90 TABLET | Refills: 1 | Status: SHIPPED | OUTPATIENT
Start: 2023-08-02

## 2023-08-16 DIAGNOSIS — I10 ESSENTIAL HYPERTENSION: ICD-10-CM

## 2023-08-16 DIAGNOSIS — I48.19 ATRIAL FIBRILLATION, PERSISTENT: ICD-10-CM

## 2023-08-16 DIAGNOSIS — Z79.01 CHRONIC ANTICOAGULATION: ICD-10-CM

## 2023-08-16 DIAGNOSIS — E03.9 ACQUIRED HYPOTHYROIDISM: ICD-10-CM

## 2023-08-16 DIAGNOSIS — F33.9 MAJOR DEPRESSION, RECURRENT, CHRONIC: ICD-10-CM

## 2023-08-16 RX ORDER — APIXABAN 5 MG/1
TABLET, FILM COATED ORAL
Qty: 180 TABLET | Refills: 0 | Status: SHIPPED | OUTPATIENT
Start: 2023-08-16

## 2023-08-16 RX ORDER — POTASSIUM CHLORIDE 750 MG/1
TABLET, FILM COATED, EXTENDED RELEASE ORAL
Qty: 30 TABLET | Refills: 0 | Status: SHIPPED | OUTPATIENT
Start: 2023-08-16

## 2023-08-16 RX ORDER — BUPROPION HYDROCHLORIDE 300 MG/1
TABLET ORAL
Qty: 30 TABLET | Refills: 0 | Status: SHIPPED | OUTPATIENT
Start: 2023-08-16

## 2023-08-16 RX ORDER — METOPROLOL SUCCINATE 200 MG/1
TABLET, EXTENDED RELEASE ORAL
Qty: 50 TABLET | Refills: 0 | Status: SHIPPED | OUTPATIENT
Start: 2023-08-16

## 2023-08-16 RX ORDER — AMLODIPINE BESYLATE 5 MG/1
TABLET ORAL
Qty: 90 TABLET | Refills: 0 | Status: SHIPPED | OUTPATIENT
Start: 2023-08-16

## 2023-08-16 RX ORDER — LEVOTHYROXINE, LIOTHYRONINE 57; 13.5 UG/1; UG/1
TABLET ORAL
Qty: 90 TABLET | Refills: 0 | Status: SHIPPED | OUTPATIENT
Start: 2023-08-16

## 2023-09-19 ENCOUNTER — OFFICE VISIT (OUTPATIENT)
Dept: FAMILY MEDICINE CLINIC | Facility: CLINIC | Age: 78
End: 2023-09-19
Payer: MEDICARE

## 2023-09-19 VITALS
DIASTOLIC BLOOD PRESSURE: 78 MMHG | WEIGHT: 191 LBS | SYSTOLIC BLOOD PRESSURE: 120 MMHG | HEART RATE: 77 BPM | TEMPERATURE: 97 F | RESPIRATION RATE: 16 BRPM | OXYGEN SATURATION: 94 % | HEIGHT: 68 IN | BODY MASS INDEX: 28.95 KG/M2

## 2023-09-19 DIAGNOSIS — I10 ESSENTIAL HYPERTENSION: ICD-10-CM

## 2023-09-19 DIAGNOSIS — F33.9 MAJOR DEPRESSION, RECURRENT, CHRONIC: ICD-10-CM

## 2023-09-19 DIAGNOSIS — K21.9 GASTROESOPHAGEAL REFLUX DISEASE WITHOUT ESOPHAGITIS: Chronic | ICD-10-CM

## 2023-09-19 DIAGNOSIS — I50.32 CHRONIC DIASTOLIC (CONGESTIVE) HEART FAILURE: Primary | ICD-10-CM

## 2023-09-19 DIAGNOSIS — M17.0 TRICOMPARTMENT OSTEOARTHRITIS OF BOTH KNEES: ICD-10-CM

## 2023-09-19 DIAGNOSIS — K59.04 FUNCTIONAL CONSTIPATION: ICD-10-CM

## 2023-09-19 DIAGNOSIS — Z74.09 IMPAIRED MOBILITY AND ADLS: ICD-10-CM

## 2023-09-19 DIAGNOSIS — Z79.01 CHRONIC ANTICOAGULATION: ICD-10-CM

## 2023-09-19 DIAGNOSIS — Z78.9 IMPAIRED MOBILITY AND ADLS: ICD-10-CM

## 2023-09-19 DIAGNOSIS — E03.9 ACQUIRED HYPOTHYROIDISM: ICD-10-CM

## 2023-09-19 DIAGNOSIS — I48.21 PERMANENT ATRIAL FIBRILLATION: ICD-10-CM

## 2023-09-19 RX ORDER — POTASSIUM CHLORIDE 750 MG/1
TABLET, FILM COATED, EXTENDED RELEASE ORAL
Qty: 30 TABLET | Refills: 0 | Status: SHIPPED | OUTPATIENT
Start: 2023-09-19

## 2023-09-19 RX ORDER — POLYETHYLENE GLYCOL 3350 17 G/17G
17 POWDER, FOR SOLUTION ORAL 3 TIMES WEEKLY
Qty: 510 G | Refills: 3 | Status: SHIPPED | OUTPATIENT
Start: 2023-09-20

## 2023-09-19 RX ORDER — BUPROPION HYDROCHLORIDE 300 MG/1
TABLET ORAL
Qty: 30 TABLET | Refills: 0 | Status: SHIPPED | OUTPATIENT
Start: 2023-09-19

## 2023-09-19 NOTE — PROGRESS NOTES
"                      Established Patient        Chief Complaint:   Chief Complaint   Patient presents with    Follow-up    Med Refill        Millie Richardson is a 77 y.o. female    History of Present Illness:   Here today in scheduled follow-up visit for chronic diastolic CHF, permanent atrial fibrillation, chronic anticoagulation, GERD, tricompartmental osteoarthritis of bilateral knees, impaired mobility and ADLs, hypothyroidism, hypertension and functional constipation.    She denies any new falls or injuries.  Denies fever, chills or night sweats.  Denies problems with current medication regimen.  Denies epistaxis or hemoptysis.  Denies hematuria.  Denies BRB/BTS.    Tolerating all p.o. intake.  No reports of aspiration/dysphagia.    Subjective     The following portions of the patient's history were reviewed and updated as appropriate: allergies, current medications, past family history, past medical history, past social history, past surgical history and problem list.    Allergies   Allergen Reactions    Nitrous Oxide Swelling    Penicillins Anaphylaxis and Swelling    Procaine Nausea Only    Sulfa Antibiotics Other (See Comments)     Patient states \"it made me want to go to bed, I didn't have a fever but I felt like I did\"        Review of Systems  Constitutional: Negative for fever. Negative for chills, diaphoresis; malaise/fatigue of chronic nature, no appreciable weight change of unintended nature.  HENT: No dysphagia; no changes to vision/hearing/smell/taste; no epistaxis  Eyes: Negative for redness and visual disturbance.   Respiratory: No hemoptysis or cough reported.  Cardiovascular: Negative for chest pain; periodic symptomatic palpitations.  Gastrointestinal: Negative for abdominal distention, abdominal pain and blood in stool.   Endocrine: Negative for cold intolerance and heat intolerance.   Genitourinary: Negative for difficulty urinating, dysuria and frequency.   Musculoskeletal: Chronic arthralgias, " "back pain and myalgias.  Patient does report easy fatigability.  Skin: No new rashes or lesions.  Neurological: Recent episode of syncope.  Generalized weakness of chronic nature.  Hematological: Negative for adenopathy. Does not bruise/bleed easily.   Psychiatric/Behavioral: Negative for confusion. The patient is not nervous/anxious.    Objective     Physical Exam   Vital Signs: /78   Pulse 77   Temp 97 øF (36.1 øC)   Resp 16   Ht 172.7 cm (68\")   Wt 86.6 kg (191 lb)   LMP  (LMP Unknown)   SpO2 94%   BMI 29.04 kg/mý   BMI is >= 25 and <30. (Overweight) The following options were offered after discussion;: exercise counseling/recommendations and nutrition counseling/recommendations    General Appearance: alert, oriented x 3, no acute distress.  Pleasant and interactive during questioning/examination.  Skin: warm and dry.  Without jaundice.  HEENT: Atraumatic.  pupils round and reactive to light and accommodation, oral mucosa pink and moist.  Nares patent without epistaxis.  External auditory canals are patent tympanic membranes intact.  Neck: supple, no JVD, trachea midline.  No thyromegaly  Lungs: Audible air exchange noted all lung fields, unlabored breathing effort.  Prolonged expiratory phase bilaterally, rhonchus and referred upper airway congestion to bilateral lungs, cleared with light cough.  Heart: RRR, normal S1 and S2, no S3, no rub.  Chronic heart murmurs.  Abdomen: soft, non-tender, no palpable bladder, present bowel sounds to auscultation x4.  No guarding or rigidity.  Extremities: no clubbing, cyanosis or edema.  Good range of motion actively and passively.  Symmetric muscle strength and development.  Ambulates with rollator.  Neuro: normal speech and mental status.  Cranial nerves II through XII intact.  No anosmia. DTR 2+; proprioception intact.  No focal motor/sensory deficits.        Assessment and Plan      Assessment/Plan:   Diagnoses and all orders for this visit:    1. Chronic " diastolic (congestive) heart failure (Primary)    2. Chronic anticoagulation    3. Permanent atrial fibrillation    4. Gastroesophageal reflux disease without esophagitis    5. Tricompartment osteoarthritis of both knees    6. Impaired mobility and ADLs    7. Acquired hypothyroidism    8. Essential hypertension    9. Functional constipation  -     polyethylene glycol (MiraLax) 17 GM/SCOOP powder; Take 17 g by mouth 3 (Three) Times a Week. Mix each dose with 8 ounces liquid  Dispense: 510 g; Refill: 3      VSS; appears HD asymptomatic; BP/HR @ goal.    Adding miralax to tx regimen; will follow clinically.  I have stressed the importance of maintaining appropriate hydration.    Continue thyroid supplementation.    Demonstrates no findings of acute volume overload.  Continue low-sodium/salt dietary intake.    Heart rate well controlled, continue anticoagulation.          Discussion Summary:    Discussed plan of care in detail with pt today; pt verb understanding and agrees.    I spent 30 minutes caring for Millie on this date of service. This time includes time spent by me in the following activities:preparing for the visit, performing a medically appropriate examination and/or evaluation , counseling and educating the patient/family/caregiver, ordering medications, tests, or procedures, documenting information in the medical record, and care coordination    I have reviewed and updated all copied forward information, as appropriate.  I attest to the accuracy and relevance of any unchanged information.    Follow up:  Return in about 4 months (around 1/19/2024) for Recheck.     There are no Patient Instructions on file for this visit.    Rajan Luis,   09/19/23  17:24 EDT

## 2023-10-25 DIAGNOSIS — F41.1 GENERALIZED ANXIETY DISORDER: ICD-10-CM

## 2023-10-25 DIAGNOSIS — F33.9 MAJOR DEPRESSION, RECURRENT, CHRONIC: ICD-10-CM

## 2023-10-25 RX ORDER — VENLAFAXINE 100 MG/1
TABLET ORAL
Qty: 180 TABLET | Refills: 1 | Status: SHIPPED | OUTPATIENT
Start: 2023-10-25

## 2023-10-31 ENCOUNTER — TELEPHONE (OUTPATIENT)
Dept: FAMILY MEDICINE CLINIC | Facility: CLINIC | Age: 78
End: 2023-10-31

## 2023-10-31 NOTE — TELEPHONE ENCOUNTER
Caller: PALMA    Relationship: Home Health    Best call back number: [phone]     What is the best time to reach you: ANYTIME UNTIL 04:30 P.M.    Who are you requesting to speak with (clinical staff, provider,  specific staff member): DR COLLIER CLINICAL STAFF    What was the call regarding: [facility] WOULD LIKE TO KNOW IF PCP WILL BE FOLLOWING THE ORDERS FOR PATIENT?

## 2023-11-02 ENCOUNTER — OUTSIDE FACILITY SERVICE (OUTPATIENT)
Dept: FAMILY MEDICINE CLINIC | Facility: CLINIC | Age: 78
End: 2023-11-02
Payer: MEDICARE

## 2023-11-02 PROCEDURE — G0180 MD CERTIFICATION HHA PATIENT: HCPCS | Performed by: FAMILY MEDICINE

## 2023-11-15 DIAGNOSIS — F33.9 MAJOR DEPRESSION, RECURRENT, CHRONIC: ICD-10-CM

## 2023-11-15 DIAGNOSIS — K21.9 GASTROESOPHAGEAL REFLUX DISEASE WITHOUT ESOPHAGITIS: Chronic | ICD-10-CM

## 2023-11-15 DIAGNOSIS — I48.19 ATRIAL FIBRILLATION, PERSISTENT: ICD-10-CM

## 2023-11-15 DIAGNOSIS — I10 ESSENTIAL HYPERTENSION: ICD-10-CM

## 2023-11-15 RX ORDER — BUPROPION HYDROCHLORIDE 300 MG/1
TABLET ORAL
Qty: 30 TABLET | Refills: 1 | Status: SHIPPED | OUTPATIENT
Start: 2023-11-15

## 2023-11-15 RX ORDER — POTASSIUM CHLORIDE 750 MG/1
TABLET, FILM COATED, EXTENDED RELEASE ORAL
Qty: 30 TABLET | Refills: 1 | Status: SHIPPED | OUTPATIENT
Start: 2023-11-15

## 2023-11-15 RX ORDER — PANTOPRAZOLE SODIUM 40 MG/1
TABLET, DELAYED RELEASE ORAL
Qty: 90 TABLET | Refills: 1 | Status: SHIPPED | OUTPATIENT
Start: 2023-11-15

## 2023-11-15 RX ORDER — METOPROLOL SUCCINATE 200 MG/1
TABLET, EXTENDED RELEASE ORAL
Qty: 50 TABLET | Refills: 1 | Status: SHIPPED | OUTPATIENT
Start: 2023-11-15

## 2024-01-10 ENCOUNTER — TELEPHONE (OUTPATIENT)
Dept: FAMILY MEDICINE CLINIC | Facility: CLINIC | Age: 79
End: 2024-01-10

## 2024-01-10 NOTE — TELEPHONE ENCOUNTER
Caller: Millie Richardson    Relationship: Self    Best call back number: [phone]     What form or medical record are you requesting: EXEMPTION LETTER FROM [employer]     Who is requesting this form or medical record from you: THE PATIENT IVY    How would you like to receive the form or medical records (pick-up, mail, fax): MAILED TO HER HOME    If mail, what is the address: [address] [place], [place] [id number]       Timeframe paperwork needed: ASAP    Additional notes: THE PATIENT REPORTS SHE JUST DISCOVERED THAT SHE RECEIVED A LETTER FOR [employer]. THE PATIENT IS REQUESTING AN EXEMPTION LETTER FROM [employer] FROM DR COLLIER DUE TO HEALTH AND AGE.     PLEASE CALL AND ADVISE.

## 2024-01-12 DIAGNOSIS — F33.9 MAJOR DEPRESSION, RECURRENT, CHRONIC: ICD-10-CM

## 2024-01-12 DIAGNOSIS — Z79.01 CHRONIC ANTICOAGULATION: ICD-10-CM

## 2024-01-12 DIAGNOSIS — I10 ESSENTIAL HYPERTENSION: ICD-10-CM

## 2024-01-12 DIAGNOSIS — E03.9 ACQUIRED HYPOTHYROIDISM: ICD-10-CM

## 2024-01-12 RX ORDER — BUPROPION HYDROCHLORIDE 300 MG/1
TABLET ORAL
Qty: 30 TABLET | Refills: 0 | Status: SHIPPED | OUTPATIENT
Start: 2024-01-12

## 2024-01-12 RX ORDER — POTASSIUM CHLORIDE 750 MG/1
TABLET, FILM COATED, EXTENDED RELEASE ORAL
Qty: 30 TABLET | Refills: 0 | Status: SHIPPED | OUTPATIENT
Start: 2024-01-12

## 2024-01-12 RX ORDER — LEVOTHYROXINE, LIOTHYRONINE 57; 13.5 UG/1; UG/1
TABLET ORAL
Qty: 90 TABLET | Refills: 0 | Status: SHIPPED | OUTPATIENT
Start: 2024-01-12

## 2024-01-12 RX ORDER — AMLODIPINE BESYLATE 5 MG/1
TABLET ORAL
Qty: 90 TABLET | Refills: 0 | Status: SHIPPED | OUTPATIENT
Start: 2024-01-12

## 2024-01-12 RX ORDER — APIXABAN 5 MG/1
TABLET, FILM COATED ORAL
Qty: 180 TABLET | Refills: 0 | Status: SHIPPED | OUTPATIENT
Start: 2024-01-12

## 2024-01-12 NOTE — TELEPHONE ENCOUNTER
Caller: Millie Richardson    Relationship to patient: Self    Best call back number: [phone]     Patient is needing: PATIENT STATES SHE NEEDS THE EXEMPTION ASAP AND IS CHECKING ON THE STATUS FOR AN UPDATE.     PLEASE CALL BACK WITH AN UPDATE.

## 2024-02-10 DIAGNOSIS — I48.19 ATRIAL FIBRILLATION, PERSISTENT: ICD-10-CM

## 2024-02-10 DIAGNOSIS — I10 ESSENTIAL HYPERTENSION: ICD-10-CM

## 2024-02-10 DIAGNOSIS — F33.9 MAJOR DEPRESSION, RECURRENT, CHRONIC: ICD-10-CM

## 2024-02-12 RX ORDER — BUPROPION HYDROCHLORIDE 300 MG/1
TABLET ORAL
Qty: 30 TABLET | Refills: 0 | Status: SHIPPED | OUTPATIENT
Start: 2024-02-12

## 2024-02-12 RX ORDER — POTASSIUM CHLORIDE 750 MG/1
TABLET, FILM COATED, EXTENDED RELEASE ORAL
Qty: 30 TABLET | Refills: 0 | Status: SHIPPED | OUTPATIENT
Start: 2024-02-12

## 2024-02-12 RX ORDER — METOPROLOL SUCCINATE 200 MG/1
TABLET, EXTENDED RELEASE ORAL
Qty: 50 TABLET | Refills: 0 | Status: SHIPPED | OUTPATIENT
Start: 2024-02-12

## 2024-03-15 ENCOUNTER — TELEPHONE (OUTPATIENT)
Dept: FAMILY MEDICINE CLINIC | Facility: CLINIC | Age: 79
End: 2024-03-15
Payer: MEDICARE

## 2024-03-26 DIAGNOSIS — F33.9 MAJOR DEPRESSION, RECURRENT, CHRONIC: ICD-10-CM

## 2024-03-26 DIAGNOSIS — I10 ESSENTIAL HYPERTENSION: ICD-10-CM

## 2024-03-26 RX ORDER — POTASSIUM CHLORIDE 750 MG/1
TABLET, FILM COATED, EXTENDED RELEASE ORAL
Qty: 30 TABLET | Refills: 0 | OUTPATIENT
Start: 2024-03-26

## 2024-03-26 RX ORDER — BUPROPION HYDROCHLORIDE 300 MG/1
TABLET ORAL
Qty: 30 TABLET | Refills: 0 | OUTPATIENT
Start: 2024-03-26

## 2024-03-29 ENCOUNTER — OFFICE VISIT (OUTPATIENT)
Dept: FAMILY MEDICINE CLINIC | Facility: CLINIC | Age: 79
End: 2024-03-29
Payer: MEDICARE

## 2024-03-29 VITALS
HEIGHT: 68 IN | WEIGHT: 199 LBS | HEART RATE: 79 BPM | OXYGEN SATURATION: 99 % | SYSTOLIC BLOOD PRESSURE: 142 MMHG | BODY MASS INDEX: 30.16 KG/M2 | DIASTOLIC BLOOD PRESSURE: 88 MMHG

## 2024-03-29 DIAGNOSIS — Z78.9 IMPAIRED MOBILITY AND ADLS: ICD-10-CM

## 2024-03-29 DIAGNOSIS — I87.2 CHRONIC VENOUS INSUFFICIENCY OF LOWER EXTREMITY: ICD-10-CM

## 2024-03-29 DIAGNOSIS — M17.0 TRICOMPARTMENT OSTEOARTHRITIS OF BOTH KNEES: ICD-10-CM

## 2024-03-29 DIAGNOSIS — Z74.09 IMPAIRED MOBILITY AND ADLS: ICD-10-CM

## 2024-03-29 DIAGNOSIS — I50.32 CHRONIC DIASTOLIC (CONGESTIVE) HEART FAILURE: Primary | ICD-10-CM

## 2024-03-29 DIAGNOSIS — E03.9 ACQUIRED HYPOTHYROIDISM: ICD-10-CM

## 2024-03-29 DIAGNOSIS — Z79.01 CHRONIC ANTICOAGULATION: ICD-10-CM

## 2024-03-29 DIAGNOSIS — I48.21 PERMANENT ATRIAL FIBRILLATION: ICD-10-CM

## 2024-03-29 PROCEDURE — 3079F DIAST BP 80-89 MM HG: CPT | Performed by: FAMILY MEDICINE

## 2024-03-29 PROCEDURE — 3077F SYST BP >= 140 MM HG: CPT | Performed by: FAMILY MEDICINE

## 2024-03-29 PROCEDURE — 99214 OFFICE O/P EST MOD 30 MIN: CPT | Performed by: FAMILY MEDICINE

## 2024-03-29 RX ORDER — POTASSIUM CHLORIDE 750 MG/1
10 TABLET, EXTENDED RELEASE ORAL DAILY
COMMUNITY

## 2024-03-29 RX ORDER — FUROSEMIDE 20 MG/1
20 TABLET ORAL DAILY
Qty: 90 TABLET | Refills: 1 | Status: SHIPPED | OUTPATIENT
Start: 2024-03-29

## 2024-03-29 RX ORDER — DIPHENOXYLATE HYDROCHLORIDE AND ATROPINE SULFATE 2.5; .025 MG/1; MG/1
1 TABLET ORAL DAILY
COMMUNITY

## 2024-03-29 NOTE — PROGRESS NOTES
"                      Established Patient        Chief Complaint:   Chief Complaint   Patient presents with    Follow-up     6mo follow up for chronic diastolic (congestive) heart failure.  Pt states she has some swelling in both feet. Says it was worse but has improved.        Millie Richardson is a 78 y.o. female    History of Present Illness:   Here today in scheduled follow-up visit of her chronic diastolic CHF, atrial fibrillation, anticoagulation, hypothyroidism, osteoarthritis of bilateral knees, venous insufficiency of the lower extremities and impaired mobility and ADLs.    Patient states that the edema of her lower extremities has improved.  She denies any orthopnea.    Denies chest pain, syncope, palpitations.  Denies fever, chills or night sweats.  Ambulates with assistance, balanced dietary intake; reports good hydration habits.  Denies hematuria/dysuria.  Denies any BRB/BTS.  No new rashes.  Denies orthopnea, epistaxis or hemoptysis.    Subjective     The following portions of the patient's history were reviewed and updated as appropriate: allergies, current medications, past family history, past medical history, past social history, past surgical history and problem list.    Allergies   Allergen Reactions    Nitrous Oxide Swelling    Penicillins Anaphylaxis and Swelling    Procaine Nausea Only    Sulfa Antibiotics Other (See Comments)     Patient states \"it made me want to go to bed, I didn't have a fever but I felt like I did\"        Review of Systems  Constitutional: Negative for fever. Negative for chills, diaphoresis; malaise/fatigue of chronic nature, no appreciable weight change of unintended nature.  HENT: No dysphagia; no changes to vision/hearing/smell/taste; no epistaxis  Eyes: Negative for redness and visual disturbance.   Respiratory: No hemoptysis or cough reported.  Cardiovascular: Negative for chest pain; periodic symptomatic palpitations.  Gastrointestinal: Negative for abdominal distention, " "abdominal pain and blood in stool.   Endocrine: Negative for cold intolerance and heat intolerance.   Genitourinary: Negative for difficulty urinating, dysuria and frequency.   Musculoskeletal: Chronic arthralgias, back pain and myalgias.  Patient does report easy fatigability, at times.  Skin: No new rashes or lesions.  Neurological: No syncopal episodes.  Generalized weakness of chronic nature.  Hematological: Negative for adenopathy. Does not bruise/bleed easily.   Psychiatric/Behavioral: Negative for confusion. The patient is not nervous/anxious.    Objective     Physical Exam   Vital Signs: /88   Pulse 79   Ht 172.7 cm (68\")   Wt 90.3 kg (199 lb)   LMP  (LMP Unknown)   SpO2 99%   BMI 30.26 kg/m²   BMI is >= 25 and <30. (Overweight) The following options were offered after discussion;: exercise counseling/recommendations and nutrition counseling/recommendations    General Appearance: alert, oriented x 3, no acute distress.  Pleasant and interactive during questioning/examination.  Skin: warm and dry.  Without jaundice.  HEENT: Atraumatic.  pupils round and reactive to light and accommodation, oral mucosa pink and moist.  Nares patent without epistaxis.  External auditory canals are patent tympanic membranes intact.  Neck: supple, no JVD, trachea midline.  No thyromegaly  Lungs: Audible air exchange noted all lung fields, unlabored breathing effort.  Prolonged expiratory phase bilaterally, rhonchus and referred upper airway congestion to bilateral lungs, cleared with light cough.  Heart: RRR, normal S1 and S2, no S3, no rub.  Chronic heart murmurs.  Abdomen: soft, non-tender, no palpable bladder, present bowel sounds to auscultation ×4.  No guarding or rigidity.  Extremities: no clubbing, cyanosis.  Good range of motion actively and passively.  Symmetric muscle strength and development.  Ambulates with rollator.  Chronic gravity dependent nonpitting edema of the lower extremities.  Neuro: normal " speech and mental status.  Cranial nerves II through XII intact.  No anosmia. DTR 2+; proprioception intact.  No focal motor/sensory deficits.    Advance Care Planning   ACP discussion was held with the patient during this visit. Patient does not have an advance directive, information provided.       Assessment and Plan      Assessment/Plan:   Diagnoses and all orders for this visit:    1. Chronic diastolic (congestive) heart failure (Primary)  -     furosemide (LASIX) 20 MG tablet; Take 1 tablet by mouth Daily.  Dispense: 90 tablet; Refill: 1  -     Comprehensive Metabolic Panel  -     CBC & Differential    2. Permanent atrial fibrillation  -     Comprehensive Metabolic Panel  -     CBC & Differential  -     TSH  -     T4, Free    3. Chronic anticoagulation  -     CBC & Differential    4. Acquired hypothyroidism  -     Comprehensive Metabolic Panel  -     TSH  -     T4, Free  -     TSH; Future  -     T4, Free; Future    5. Tricompartment osteoarthritis of both knees    6. Impaired mobility and ADLs    7. Chronic venous insufficiency of lower extremity  -     furosemide (LASIX) 20 MG tablet; Take 1 tablet by mouth Daily.  Dispense: 90 tablet; Refill: 1  -     Comprehensive Metabolic Panel  -     TSH  -     T4, Free      CMP/CBC/Thyroid labs today.  Thyroid supplement dose adjustment if indicated.    Surveillance BMP at next visit, Medicare Wellness, d/t starting once daily Lasix.  Utilize low-sodium/salt dietary intake.  Daily weight evaluation.    VSS, appears HD asymptomatic; no active signs of bleed.  Heart rate well-controlled.    Pain appears clinically well-controlled at present.      Discussion Summary:    Discussed plan of care in detail with pt today; pt verb understanding and agrees.    I spent 30 minutes caring for Millie on this date of service. This time includes time spent by me in the following activities:preparing for the visit, performing a medically appropriate examination and/or evaluation ,  counseling and educating the patient/family/caregiver, ordering medications, tests, or procedures, documenting information in the medical record, and care coordination    I have reviewed and updated all copied forward information, as appropriate.  I attest to the accuracy and relevance of any unchanged information.    Follow up:  Return in about 4 weeks (around 4/26/2024) for Medicare Wellness, Subsequent.     There are no Patient Instructions on file for this visit.    Raajn Luis DO  04/09/24  12:51 EDT

## 2024-03-30 LAB
ALBUMIN SERPL-MCNC: 4.1 G/DL (ref 3.5–5.2)
ALBUMIN/GLOB SERPL: 2.1 G/DL
ALP SERPL-CCNC: 140 U/L (ref 39–117)
ALT SERPL-CCNC: 17 U/L (ref 1–33)
AST SERPL-CCNC: 13 U/L (ref 1–32)
BASOPHILS # BLD AUTO: 0.06 10*3/MM3 (ref 0–0.2)
BASOPHILS NFR BLD AUTO: 1.2 % (ref 0–1.5)
BILIRUB SERPL-MCNC: 0.2 MG/DL (ref 0–1.2)
BUN SERPL-MCNC: 18 MG/DL (ref 8–23)
BUN/CREAT SERPL: 24 (ref 7–25)
CALCIUM SERPL-MCNC: 8.8 MG/DL (ref 8.6–10.5)
CHLORIDE SERPL-SCNC: 106 MMOL/L (ref 98–107)
CO2 SERPL-SCNC: 26.6 MMOL/L (ref 22–29)
CREAT SERPL-MCNC: 0.75 MG/DL (ref 0.57–1)
EGFRCR SERPLBLD CKD-EPI 2021: 80.6 ML/MIN/1.73
EOSINOPHIL # BLD AUTO: 0.31 10*3/MM3 (ref 0–0.4)
EOSINOPHIL NFR BLD AUTO: 6.4 % (ref 0.3–6.2)
ERYTHROCYTE [DISTWIDTH] IN BLOOD BY AUTOMATED COUNT: 13.8 % (ref 12.3–15.4)
GLOBULIN SER CALC-MCNC: 2 GM/DL
GLUCOSE SERPL-MCNC: 95 MG/DL (ref 65–99)
HCT VFR BLD AUTO: 36.1 % (ref 34–46.6)
HGB BLD-MCNC: 11.7 G/DL (ref 12–15.9)
IMM GRANULOCYTES # BLD AUTO: 0.01 10*3/MM3 (ref 0–0.05)
IMM GRANULOCYTES NFR BLD AUTO: 0.2 % (ref 0–0.5)
LYMPHOCYTES # BLD AUTO: 1.89 10*3/MM3 (ref 0.7–3.1)
LYMPHOCYTES NFR BLD AUTO: 39 % (ref 19.6–45.3)
MCH RBC QN AUTO: 29.3 PG (ref 26.6–33)
MCHC RBC AUTO-ENTMCNC: 32.4 G/DL (ref 31.5–35.7)
MCV RBC AUTO: 90.3 FL (ref 79–97)
MONOCYTES # BLD AUTO: 0.43 10*3/MM3 (ref 0.1–0.9)
MONOCYTES NFR BLD AUTO: 8.9 % (ref 5–12)
NEUTROPHILS # BLD AUTO: 2.15 10*3/MM3 (ref 1.7–7)
NEUTROPHILS NFR BLD AUTO: 44.3 % (ref 42.7–76)
NRBC BLD AUTO-RTO: 0 /100 WBC (ref 0–0.2)
PLATELET # BLD AUTO: 187 10*3/MM3 (ref 140–450)
POTASSIUM SERPL-SCNC: 4.4 MMOL/L (ref 3.5–5.2)
PROT SERPL-MCNC: 6.1 G/DL (ref 6–8.5)
RBC # BLD AUTO: 4 10*6/MM3 (ref 3.77–5.28)
SODIUM SERPL-SCNC: 142 MMOL/L (ref 136–145)
T4 FREE SERPL-MCNC: 0.77 NG/DL (ref 0.93–1.7)
TSH SERPL DL<=0.005 MIU/L-ACNC: 8.21 UIU/ML (ref 0.27–4.2)
WBC # BLD AUTO: 4.85 10*3/MM3 (ref 3.4–10.8)

## 2024-04-06 DIAGNOSIS — E03.9 ACQUIRED HYPOTHYROIDISM: ICD-10-CM

## 2024-04-06 DIAGNOSIS — K21.9 GASTROESOPHAGEAL REFLUX DISEASE WITHOUT ESOPHAGITIS: Chronic | ICD-10-CM

## 2024-04-06 DIAGNOSIS — I10 ESSENTIAL HYPERTENSION: ICD-10-CM

## 2024-04-06 DIAGNOSIS — F41.1 GENERALIZED ANXIETY DISORDER: ICD-10-CM

## 2024-04-06 DIAGNOSIS — F33.9 MAJOR DEPRESSION, RECURRENT, CHRONIC: ICD-10-CM

## 2024-04-06 DIAGNOSIS — Z79.01 CHRONIC ANTICOAGULATION: ICD-10-CM

## 2024-04-08 RX ORDER — LISINOPRIL 40 MG/1
40 TABLET ORAL DAILY
Qty: 90 TABLET | Refills: 0 | Status: SHIPPED | OUTPATIENT
Start: 2024-04-08

## 2024-04-08 RX ORDER — APIXABAN 5 MG/1
TABLET, FILM COATED ORAL
Qty: 180 TABLET | Refills: 0 | Status: SHIPPED | OUTPATIENT
Start: 2024-04-08

## 2024-04-08 RX ORDER — LEVOTHYROXINE, LIOTHYRONINE 57; 13.5 UG/1; UG/1
TABLET ORAL
Qty: 90 TABLET | Refills: 0 | Status: SHIPPED | OUTPATIENT
Start: 2024-04-08

## 2024-04-08 RX ORDER — AMLODIPINE BESYLATE 5 MG/1
TABLET ORAL
Qty: 90 TABLET | Refills: 0 | Status: SHIPPED | OUTPATIENT
Start: 2024-04-08

## 2024-04-08 RX ORDER — PANTOPRAZOLE SODIUM 40 MG/1
TABLET, DELAYED RELEASE ORAL
Qty: 90 TABLET | Refills: 0 | Status: SHIPPED | OUTPATIENT
Start: 2024-04-08

## 2024-04-08 RX ORDER — VENLAFAXINE 100 MG/1
TABLET ORAL
Qty: 180 TABLET | Refills: 1 | Status: SHIPPED | OUTPATIENT
Start: 2024-04-08

## 2024-04-09 DIAGNOSIS — I10 ESSENTIAL HYPERTENSION: ICD-10-CM

## 2024-04-09 DIAGNOSIS — F33.9 MAJOR DEPRESSION, RECURRENT, CHRONIC: ICD-10-CM

## 2024-04-09 DIAGNOSIS — I48.19 ATRIAL FIBRILLATION, PERSISTENT: ICD-10-CM

## 2024-04-10 RX ORDER — METOPROLOL SUCCINATE 200 MG/1
TABLET, EXTENDED RELEASE ORAL
Qty: 50 TABLET | Refills: 0 | Status: SHIPPED | OUTPATIENT
Start: 2024-04-10

## 2024-04-10 RX ORDER — POTASSIUM CHLORIDE 750 MG/1
TABLET, FILM COATED, EXTENDED RELEASE ORAL
Qty: 30 TABLET | Refills: 0 | Status: SHIPPED | OUTPATIENT
Start: 2024-04-10

## 2024-04-10 RX ORDER — BUPROPION HYDROCHLORIDE 300 MG/1
TABLET ORAL
Qty: 30 TABLET | Refills: 0 | Status: SHIPPED | OUTPATIENT
Start: 2024-04-10

## 2024-05-01 ENCOUNTER — OFFICE VISIT (OUTPATIENT)
Dept: BEHAVIORAL HEALTH | Facility: CLINIC | Age: 79
End: 2024-05-01
Payer: MEDICARE

## 2024-05-01 DIAGNOSIS — F33.0 MILD EPISODE OF RECURRENT MAJOR DEPRESSIVE DISORDER: ICD-10-CM

## 2024-05-01 DIAGNOSIS — F43.9 TRAUMA AND STRESSOR-RELATED DISORDER: Primary | ICD-10-CM
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
## 2024-05-17 ENCOUNTER — OFFICE VISIT (OUTPATIENT)
Dept: FAMILY MEDICINE CLINIC | Facility: CLINIC | Age: 79
End: 2024-05-17
Payer: MEDICARE

## 2024-05-17 ENCOUNTER — TELEPHONE (OUTPATIENT)
Dept: FAMILY MEDICINE CLINIC | Facility: CLINIC | Age: 79
End: 2024-05-17

## 2024-05-17 VITALS
SYSTOLIC BLOOD PRESSURE: 142 MMHG | WEIGHT: 198.2 LBS | OXYGEN SATURATION: 97 % | HEART RATE: 65 BPM | HEIGHT: 68 IN | BODY MASS INDEX: 30.04 KG/M2 | DIASTOLIC BLOOD PRESSURE: 66 MMHG

## 2024-05-17 DIAGNOSIS — Z79.01 CHRONIC ANTICOAGULATION: ICD-10-CM

## 2024-05-17 DIAGNOSIS — I87.2 CHRONIC VENOUS INSUFFICIENCY OF LOWER EXTREMITY: ICD-10-CM

## 2024-05-17 DIAGNOSIS — Z78.9 IMPAIRED MOBILITY AND ADLS: ICD-10-CM

## 2024-05-17 DIAGNOSIS — Z00.00 MEDICARE ANNUAL WELLNESS VISIT, SUBSEQUENT: Primary | ICD-10-CM

## 2024-05-17 DIAGNOSIS — Z91.81 AT MODERATE RISK FOR FALL: ICD-10-CM

## 2024-05-17 DIAGNOSIS — I48.21 PERMANENT ATRIAL FIBRILLATION: ICD-10-CM

## 2024-05-17 DIAGNOSIS — I50.32 CHRONIC DIASTOLIC (CONGESTIVE) HEART FAILURE: ICD-10-CM

## 2024-05-17 DIAGNOSIS — E03.9 ACQUIRED HYPOTHYROIDISM: ICD-10-CM

## 2024-05-17 DIAGNOSIS — Z74.09 IMPAIRED MOBILITY AND ADLS: ICD-10-CM

## 2024-05-17 DIAGNOSIS — I10 ESSENTIAL HYPERTENSION: ICD-10-CM

## 2024-05-17 RX ORDER — FUROSEMIDE 20 MG/1
20 TABLET ORAL DAILY PRN
Qty: 90 TABLET | Refills: 1 | Status: SHIPPED | OUTPATIENT
Start: 2024-05-17

## 2024-05-17 NOTE — PROGRESS NOTES
The ABCs of the Annual Wellness Visit  Subsequent Medicare Wellness Visit    Subjective      Millie Richardson is a 78 y.o. female who presents for a Subsequent Medicare Wellness Visit.    The following portions of the patient's history were reviewed and   updated as appropriate: allergies, current medications, past family history, past medical history, past social history, past surgical history, and problem list.    Compared to one year ago, the patient feels her physical   health is the same.    Compared to one year ago, the patient feels her mental   health is the same.    Recent Hospitalizations:  She was not admitted to the hospital during the last year.       Current Medical Providers:  Patient Care Team:  Rajan Luis DO as PCP - General (Family Medicine)  Martínez Cintron MD as Consulting Physician (Cardiology)  Sheeba Shepherd MD as Consulting Physician (Pulmonary Disease)  Rebekah Crowe MD as Consulting Physician (Obstetrics and Gynecology)  Dana Espinoza APRN as Nurse Practitioner (Family Medicine)    Outpatient Medications Prior to Visit   Medication Sig Dispense Refill    amitriptyline (ELAVIL) 25 MG tablet Take 1-2 tablets by mouth every night at bedtime. 60 tablet 3    amLODIPine (NORVASC) 5 MG tablet TAKE ONE TABLET BY MOUTH ONCE DAILY 90 tablet 0    buPROPion XL (WELLBUTRIN XL) 300 MG 24 hr tablet TAKE ONE TABLET BY MOUTH ONCE DAILY 30 tablet 0    Eliquis 5 MG tablet tablet TAKE ONE TABLET BY MOUTH TWICE DAILY 180 tablet 0    lisinopril (PRINIVIL,ZESTRIL) 40 MG tablet TAKE ONE TABLET BY MOUTH DAILY 90 tablet 0    metoprolol succinate XL (TOPROL-XL) 100 MG 24 hr tablet Take 2 tablets by mouth 2 (Two) Times a Day. 180 tablet 1    metoprolol succinate XL (TOPROL-XL) 200 MG 24 hr tablet TAKE ONE TABLET BY MOUTH ONCE DAILY 50 tablet 0    Misc. Devices (Bath/Shower Seat) misc 1 Units As Needed (bathing). 1 each 0    Misc. Devices (Rollator Ultra-Light) misc 1 Units Daily. 1 each 0    Multi-Vitamin  tablet tablet Take 1 tablet by mouth Daily.      multivitamin with minerals (MULTIVITAMIN ADULT PO) Take 1 tablet by mouth Daily.      NP Thyroid 90 MG tablet TAKE ONE TABLET BY MOUTH DAILY 90 tablet 0    pantoprazole (PROTONIX) 40 MG EC tablet TAKE ONE TABLET BY MOUTH ONCE DAILY 30 TO 60 MINUTES BEFORE A MEAL 90 tablet 0    potassium chloride (KLOR-CON M10) 10 MEQ CR tablet Take 1 tablet by mouth Daily.      potassium chloride 10 MEQ CR tablet TAKE ONE TABLET BY MOUTH ONCE DAILY 30 tablet 0    venlafaxine (EFFEXOR) 100 MG tablet TAKE ONE TABLET BY MOUTH TWICE DAILY 180 tablet 1    polyethylene glycol (MiraLax) 17 GM/SCOOP powder Take 17 g by mouth 3 (Three) Times a Week. Mix each dose with 8 ounces liquid (Patient not taking: Reported on 5/17/2024) 510 g 3    Fluticasone-Umeclidin-Vilant (Trelegy Ellipta) 100-62.5-25 MCG/ACT inhaler Inhale 1 puff Daily. (Patient not taking: Reported on 5/17/2024) 14 each 0    furosemide (LASIX) 20 MG tablet Take 1 tablet by mouth Daily. (Patient not taking: Reported on 5/17/2024) 90 tablet 1     No facility-administered medications prior to visit.       No opioid medication identified on active medication list. I have reviewed chart for other potential  high risk medication/s and harmful drug interactions in the elderly.        Aspirin is not on active medication list.  Aspirin use is not indicated based on review of current medical condition/s. Risk of harm outweighs potential benefits.  .    Patient Active Problem List   Diagnosis    Permanent atrial fibrillation    Essential hypertension    History of myocardial infarction    Acquired hypothyroidism    Depression    Blepharospasm    Chronic diastolic (congestive) heart failure    Pulmonary hypertension    Gastroesophageal reflux disease without esophagitis    Major depression, recurrent, chronic    Generalized anxiety disorder    Chronic anticoagulation    Age-related osteoporosis without current pathological fracture     Moderate mitral regurgitation    Moderate tricuspid regurgitation    Cervical radiculopathy due to degenerative joint disease of spine    Tricompartment osteoarthritis of both knees    Spastic torticollis    Vertigo    Autonomic instability    Impaired mobility and ADLs    Choroidal neovascular membrane of right eye    Combined forms of age-related cataract of both eyes    Keratoconjunctivitis sicca of both eyes not specified as Sjogren's    Myopia of both eyes    Vitamin B12 deficiency    Sjogren's syndrome    Bilateral degenerative progressive high myopia    Exudative age-related macular degeneration of right eye with inactive choroidal neovascularization    Degenerative cervical spinal stenosis    Chronic venous insufficiency of lower extremity     Advance Care Planning   Advance Care Planning     Advance Directive is not on file.  ACP discussion was held with the patient during this visit. Patient does not have an advance directive, information provided.    Review of Systems  Constitutional: Negative for fever. Negative for chills, diaphoresis; malaise/fatigue of chronic nature, no appreciable weight change of unintended nature.  HENT: No dysphagia; no changes to vision/hearing/smell/taste; no epistaxis  Eyes: Negative for redness and visual disturbance.   Respiratory: No hemoptysis or cough reported.  Cardiovascular: Negative for chest pain; periodic symptomatic palpitations.  Gastrointestinal: Negative for abdominal distention, abdominal pain and blood in stool.   Endocrine: Negative for cold intolerance and heat intolerance.   Genitourinary: Negative for difficulty urinating, dysuria and frequency.   Musculoskeletal: Chronic arthralgias, back pain and myalgias.  Patient does report easy fatigability, at times.  Skin: No new rashes or lesions.  Neurological: No syncopal episodes.  Generalized weakness of chronic nature.  Hematological: Negative for adenopathy. Does not bruise/bleed easily.  "  Psychiatric/Behavioral: Negative for confusion. The patient is not nervous/anxious.       Objective    Vitals:    05/17/24 1044   BP: 142/66   Pulse: 65   SpO2: 97%   Weight: 89.9 kg (198 lb 3.2 oz)   Height: 172.7 cm (68\")     Estimated body mass index is 30.14 kg/m² as calculated from the following:    Height as of this encounter: 172.7 cm (68\").    Weight as of this encounter: 89.9 kg (198 lb 3.2 oz).     General Appearance: alert, oriented x 3, no acute distress.  Pleasant and interactive during questioning/examination.  Skin: warm and dry.  Without jaundice.  HEENT: Atraumatic.  pupils round and reactive to light and accommodation, oral mucosa pink and moist.  Nares patent without epistaxis.  External auditory canals are patent tympanic membranes intact.  Neck: supple, no JVD, trachea midline.  No thyromegaly  Lungs: Audible air exchange noted all lung fields, unlabored breathing effort.  Prolonged expiratory phase bilaterally, rhonchus and referred upper airway congestion to bilateral lungs, cleared with light cough.  Heart: RRR, normal S1 and S2, no S3, no rub.  Chronic heart murmurs.  Abdomen: soft, non-tender, no palpable bladder, present bowel sounds to auscultation ×4.  No guarding or rigidity.  Extremities: no clubbing, cyanosis.  Good range of motion actively and passively.  Symmetric muscle strength and development.  Ambulates with rollator.  Chronic gravity dependent nonpitting edema of the lower extremities.  Neuro: normal speech and mental status.  Cranial nerves II through XII intact.  No anosmia. DTR 2+; proprioception intact.  No focal motor/sensory deficits.      Does the patient have evidence of cognitive impairment?   No            HEALTH RISK ASSESSMENT    Smoking Status:  Social History     Tobacco Use   Smoking Status Never    Passive exposure: Never   Smokeless Tobacco Never     Alcohol Consumption:  Social History     Substance and Sexual Activity   Alcohol Use No     Fall Risk " Screen:    DENIZ Fall Risk Assessment was completed, and patient is at MODERATE risk for falls. Assessment completed on:2024    Depression Screenin/17/2024    10:55 AM   PHQ-2/PHQ-9 Depression Screening   Little Interest or Pleasure in Doing Things 0-->not at all   Feeling Down, Depressed or Hopeless 2-->more than half the days   Trouble Falling or Staying Asleep, or Sleeping Too Much 2-->more than half the days   Feeling Tired or Having Little Energy 2-->more than half the days   Poor Appetite or Overeating 0-->not at all   Feeling Bad about Yourself - or that You are a Failure or Have Let Yourself or Your Family Down 0-->not at all   Trouble Concentrating on Things, Such as Reading the Newspaper or Watching Television 0-->not at all   Moving or Speaking So Slowly that Other People Could Have Noticed? Or the Opposite - Being So Fidgety 0-->not at all   Thoughts that You Would be Better Off Dead or of Hurting Yourself in Some Way 0-->not at all   PHQ-9: Brief Depression Severity Measure Score 6   If You Checked Off Any Problems, How Difficult Have These Problems Made It For You to Do Your Work, Take Care of Things at Home, or Get Along with Other People? very difficult       Health Habits and Functional and Cognitive Screenin/17/2024    11:00 AM   Functional & Cognitive Status   Do you have difficulty preparing food and eating? Yes   Do you have difficulty bathing yourself, getting dressed or grooming yourself? No   Do you have difficulty using the toilet? Yes   Do you have difficulty moving around from place to place? Yes   Do you have trouble with steps or getting out of a bed or a chair? No   Current Diet Limited Junk Food   Dental Exam Other   Eye Exam Up to date   Exercise (times per week) 0 times per week   Current Exercises Include No Regular Exercise   Do you need help using the phone?  Yes   Are you deaf or do you have serious difficulty hearing?  Yes   Do you need help to go to  places out of walking distance? Yes   Do you need help shopping? Yes   Do you need help preparing meals?  Yes   Do you need help with housework?  Yes   Do you need help with laundry? Yes   Do you need help taking your medications? Yes   Do you need help managing money? No   Do you ever drive or ride in a car without wearing a seat belt? No   Have you felt unusual stress, anger or loneliness in the last month? No   Who do you live with? Alone   If you need help, do you have trouble finding someone available to you? Yes   Have you been bothered in the last four weeks by sexual problems? No   Do you have difficulty concentrating, remembering or making decisions? Yes       Age-appropriate Screening Schedule:  Refer to the list below for future screening recommendations based on patient's age, sex and/or medical conditions. Orders for these recommended tests are listed in the plan section. The patient has been provided with a written plan.    Health Maintenance   Topic Date Due    DXA SCAN  Never done    ZOSTER VACCINE (1 of 2) Never done    RSV Vaccine - Adults (1 - 1-dose 60+ series) Never done    COVID-19 Vaccine (4 - 2023-24 season) 09/01/2023    INFLUENZA VACCINE  08/01/2024    BMI FOLLOWUP  03/29/2025    ANNUAL WELLNESS VISIT  05/17/2025    TDAP/TD VACCINES (3 - Td or Tdap) 08/06/2028    Pneumococcal Vaccine 65+  Completed    COLORECTAL CANCER SCREENING  Discontinued                  CMS Preventative Services Quick Reference  Risk Factors Identified During Encounter:    None Identified    The above risks/problems have been discussed with the patient.  Pertinent information has been shared with the patient in the After Visit Summary.    Diagnoses and all orders for this visit:    1. Medicare annual wellness visit, subsequent (Primary)    2. Impaired mobility and ADLs    3. Essential hypertension    4. Chronic diastolic (congestive) heart failure  -     furosemide (LASIX) 20 MG tablet; Take 1 tablet by mouth Daily As  Needed (Edema).  Dispense: 90 tablet; Refill: 1    5. Chronic venous insufficiency of lower extremity  -     furosemide (LASIX) 20 MG tablet; Take 1 tablet by mouth Daily As Needed (Edema).  Dispense: 90 tablet; Refill: 1    6. Permanent atrial fibrillation    7. Chronic anticoagulation    8. Acquired hypothyroidism    9. At moderate risk for fall        Follow Up:   Next Medicare Wellness visit to be scheduled in 1 year.      An After Visit Summary and PPPS were made available to the patient.    Left ribcage pain sounds as if it was paroxysmal intercostal neuralgia.    Vital signs demonstrate hemodynamic stability.  No obvious findings of volume overload at this time.  Refill of furosemide to be used on an as-needed basis.    Heart rate and blood pressure are at goal.    Continue fall precautions.  Utilizing rollator to aid in ambulation.    No obvious signs of bleeding.    I have discussed age/gender specific preventative healthcare issues in detail with patient today.  I have answered all of the questions.    I have reviewed and updated all copied forward information, as appropriate.  I attest to the accuracy and relevance of any unchanged information.

## 2024-05-17 NOTE — PATIENT INSTRUCTIONS
Advance Care Planning and Advance Directives     You make decisions on a daily basis - decisions about where you want to live, your career, your home, your life. Perhaps one of the most important decisions you face is your choice for future medical care. Take time to talk with your family and your healthcare team and start planning today.  Advance Care Planning is a process that can help you:  Understand possible future healthcare decisions in light of your own experiences  Reflect on those decision in light of your goals and values  Discuss your decisions with those closest to you and the healthcare professionals that care for you  Make a plan by creating a document that reflects your wishes    Surrogate Decision Maker  In the event of a medical emergency, which has left you unable to communicate or to make your own decisions, you would need someone to make decisions for you.  It is important to discuss your preferences for medical treatment with this person while you are in good health.     Qualities of a surrogate decision maker:  Willing to take on this role and responsibility  Knows what you want for future medical care  Willing to follow your wishes even if they don't agree with them  Able to make difficult medical decisions under stressful circumstances    Advance Directives  These are legal documents you can create that will guide your healthcare team and decision maker(s) when needed. These documents can be stored in the electronic medical record.    Living Will - a legal document to guide your care if you have a terminal condition or a serious illness and are unable to communicate. States vary by statute in document names/types, but most forms may include one or more of the following:        -  Directions regarding life-prolonging treatments        -  Directions regarding artificially provided nutrition/hydration        -  Choosing a healthcare decision maker        -  Direction regarding organ/tissue  donation    Durable Power of  for Healthcare - this document names an -in-fact to make medical decisions for you, but it may also allow this person to make personal and financial decisions for you. Please seek the advice of an  if you need this type of document.    **Advance Directives are not required and no one may discriminate against you if you do not sign one.    Medical Orders  Many states allow specific forms/orders signed by your physician to record your wishes for medical treatment in your current state of health. This form, signed in personal communication with your physician, addresses resuscitation and other medical interventions that you may or may not want.      For more information or to schedule a time with a [facility] Advance Care Planning Facilitator contact: [facility] or call [phone] and someone will contact you directly.  Fall Prevention in the Home, Adult  Falls can cause injuries and affect people of all ages. There are many simple things that you can do to make your home safe and to help prevent falls.  If you need it, ask for help making these changes.  What actions can I take to prevent falls?  General information  Use good lighting in all rooms. Make sure to:  Replace any light bulbs that burn out.  Turn on lights if it is dark and use night-lights.  Keep items that you use often in easy-to-reach places. Lower the shelves around your home if needed.  Move furniture so that there are clear paths around it.  Do not keep throw rugs or other things on the floor that can make you trip.  If any of your floors are uneven, fix them.  Add color or contrast paint or tape to clearly jelly and help you see:  Grab bars or handrails.  First and last steps of staircases.  Where the edge of each step is.  If you use a ladder or stepladder:  Make sure that it is fully opened. Do not climb a closed ladder.  Make sure the sides of the ladder are locked in  place.  Have someone hold the ladder while you use it.  Know where your pets are as you move through your home.  What can I do in the bathroom?         Keep the floor dry. Clean up any water that is on the floor right away.  Remove soap buildup in the bathtub or shower. Buildup makes bathtubs and showers slippery.  Use non-skid mats or decals on the floor of the bathtub or shower.  Attach bath mats securely with double-sided, non-slip rug tape.  If you need to sit down while you are in the shower, use a non-slip stool.  Install grab bars by the toilet and in the bathtub and shower. Do not use towel bars as grab bars.  What can I do in the bedroom?  Make sure that you have a light by your bed that is easy to reach.  Do not use any sheets or blankets on your bed that hang to the floor.  Have a firm bench or chair with side arms that you can use for support when you get dressed.  What can I do in the kitchen?  Clean up any spills right away.  If you need to reach something above you, use a sturdy step stool that has a grab bar.  Keep electrical cables out of the way.  Do not use floor polish or wax that makes floors slippery.  What can I do with my stairs?  Do not leave anything on the stairs.  Make sure that you have a light switch at the top and the bottom of the stairs. Have them installed if you do not have them.  Make sure that there are handrails on both sides of the stairs. Fix handrails that are broken or loose. Make sure that handrails are as long as the staircases.  Install non-slip stair treads on all stairs in your home if they do not have carpet.  Avoid having throw rugs at the top or bottom of stairs, or secure the rugs with carpet tape to prevent them from moving.  Choose a carpet design that does not hide the edge of steps on the stairs. Make sure that carpet is firmly attached to the stairs. Fix any carpet that is loose or worn.  What can I do on the outside of my home?  Use bright outdoor  lighting.  Repair the edges of walkways and driveways and fix any cracks. Clear paths of anything that can make you trip, such as tools or rocks.  Add color or contrast paint or tape to clearly jelly and help you see high doorway thresholds.  Trim any bushes or trees on the main path into your home.  Check that handrails are securely fastened and in good repair. Both sides of all steps should have handrails.  Install guardrails along the edges of any raised decks or porches.  Have leaves, snow, and ice cleared regularly. Use sand, salt, or ice melt on walkways during winter months if you live where there is ice and snow.  In the garage, clean up any spills right away, including grease or oil spills.  What other actions can I take?  Review your medicines with your health care provider. Some medicines can make you confused or feel dizzy. This can increase your chance of falling.  Wear closed-toe shoes that fit well and support your feet. Wear shoes that have rubber soles and low heels.  Use a cane, walker, scooter, or crutches that help you move around if needed.  Talk with your provider about other ways that you can decrease your risk of falls. This may include seeing a physical therapist to learn to do exercises to improve movement and strength.  Where to find more information  Centers for Disease Control and PreventionDENIZ: cdc.gov  [place] on Aging: tres.nih.gov  [place] on Aging: tres.nih.gov  Contact a health care provider if:  You are afraid of falling at home.  You feel weak, drowsy, or dizzy at home.  You fall at home.  Get help right away if you:  Lose consciousness or have trouble moving after a fall.  Have a fall that causes a head injury.  These symptoms may be an emergency. Get help right away. Call 911.  Do not wait to see if the symptoms will go away.  Do not drive yourself to the hospital.  This information is not intended to replace advice given to you by your health care  provider. Make sure you discuss any questions you have with your health care provider.  Document Revised: 08/21/2023 Document Reviewed: 08/21/2023  Elsevier Patient Education © 2024 [employer]    Sit-to-Stand Exercise    The sit-to-stand exercise (also known as the chair stand or chair rise exercise) strengthens your lower body and helps you maintain or improve your mobility and independence. The end goal is to do the sit-to-stand exercise without using your hands. This will be easier as you become stronger. You should always talk with your health care provider before starting any exercise program, especially if you have had recent surgery.  Do the exercise exactly as told by your health care provider and adjust it as directed. It is normal to feel mild stretching, pulling, tightness, or discomfort as you do this exercise, but you should stop right away if you feel sudden pain or your pain gets worse. Do not begin doing this exercise until told by your health care provider.  What the sit-to-stand exercise does  The sit-to-stand exercise helps to strengthen the muscles in your thighs and the muscles in the center of your body that give you stability (core muscles). This exercise is especially helpful if:  You have had knee or hip surgery.  You have trouble getting up from a chair, out of a car, or off the toilet due to muscle weakness.  How to do the sit-to-stand exercise  Sit toward the front edge of a sturdy chair without armrests. Your knees should be bent and your feet should be flat on the floor and shoulder-width apart and underneath your hips.  Place your hands lightly on each side of the seat. Keep your back and neck as straight as possible, with your chest slightly forward.  Breathe in slowly. Lean forward and slightly shift your weight to the front of your feet.  Breathe out as you slowly stand up. Try not to support any weight with your hands.  Stand and pause for a full breath in and out.  Breathe in  as you sit down slowly. Tighten your core and abdominal muscles to control your lowering as much as possible. You should lower yourself back to the chair slowly, not just drop back into the seat.  Breathe out slowly.  Do this exercise 10-15 times. If needed, do it fewer times until you build up strength.  Rest for 1 minute, then do another set of 10-15 repetitions.  To change the difficulty of the sit-to-stand exercise  If the exercise is too difficult, use a chair with sturdy armrests, and push off the armrests to help you come to the standing position. You can also use the armrests to help slowly lower yourself back to sitting. As this gets easier, try to use your arms less. You can also place a firm cushion or pillow on the chair to make the surface higher.  If this exercise is too easy, do not use your arms to help raise or lower yourself. You can also wear a weighted vest, use hand weights, increase your repetitions, or try a lower chair.  General tips  You may feel tired when starting an exercise routine. This is normal.  You may have muscle soreness that lasts a few days. This is normal. As you get stronger, you may not feel muscle soreness.  Use smooth, steady movements.  Do not  hold your breath during strength exercises. This can cause unsafe changes in your blood pressure.  Breathe in slowly through your nose, and breathe out slowly through your mouth.  Summary  Strengthening your lower body is an important step to help you move safely and independently.  The sit-to-stand exercise helps strengthen the muscles in your thighs and core.  You should always talk with your health care provider before starting any exercise program, especially if you have had recent surgery.  This information is not intended to replace advice given to you by your health care provider. Make sure you discuss any questions you have with your health care provider.  Document Revised: 04/10/2022 Document Reviewed: 04/10/2022  [employer]  Patient Education 2024 Elsevier Inc.      Medicare Wellness  Personal Prevention Plan of Service     Date of Office Visit:    Encounter Provider:  Rajan Luis DO  Place of Service:  [facility]  Patient Name: Millie Richardson  :  1945    As part of the Medicare Wellness portion of your visit today, we are providing you with this personalized preventive plan of services (PPPS). This plan is based upon recommendations of the [place] Preventive Services Task Force (USPSTF) and the Advisory Committee on Immunization Practices (ACIP).    This lists the preventive care services that should be considered, and provides dates of when you are due. Items listed as completed are up-to-date and do not require any further intervention.    Health Maintenance   Topic Date Due   • DXA SCAN  Never done   • ZOSTER VACCINE (1 of 2) Never done   • RSV Vaccine - Adults (1 - 1-dose 60+ series) Never done   • COVID-19 Vaccine ( - -24 season) 2023   • ANNUAL WELLNESS VISIT  2024   • INFLUENZA VACCINE  2024   • BMI FOLLOWUP  2025   • TDAP/TD VACCINES (3 - Td or Tdap) 2028   • Pneumococcal Vaccine 65+  Completed   • COLORECTAL CANCER SCREENING  Discontinued       No orders of the defined types were placed in this encounter.      No follow-ups on file.

## 2024-06-04 DIAGNOSIS — I10 ESSENTIAL HYPERTENSION: ICD-10-CM

## 2024-06-04 DIAGNOSIS — F33.9 MAJOR DEPRESSION, RECURRENT, CHRONIC: ICD-10-CM

## 2024-06-04 RX ORDER — POTASSIUM CHLORIDE 750 MG/1
TABLET, FILM COATED, EXTENDED RELEASE ORAL
Qty: 30 TABLET | Refills: 0 | Status: SHIPPED | OUTPATIENT
Start: 2024-06-04

## 2024-06-04 RX ORDER — BUPROPION HYDROCHLORIDE 300 MG/1
TABLET ORAL
Qty: 30 TABLET | Refills: 0 | Status: SHIPPED | OUTPATIENT
Start: 2024-06-04

## 2024-06-11 DIAGNOSIS — I48.19 ATRIAL FIBRILLATION, PERSISTENT: ICD-10-CM

## 2024-06-11 RX ORDER — METOPROLOL SUCCINATE 200 MG/1
TABLET, EXTENDED RELEASE ORAL
Qty: 50 TABLET | Refills: 0 | Status: SHIPPED | OUTPATIENT
Start: 2024-06-11

## 2024-06-29 DIAGNOSIS — F33.9 MAJOR DEPRESSION, RECURRENT, CHRONIC: ICD-10-CM

## 2024-06-29 DIAGNOSIS — I10 ESSENTIAL HYPERTENSION: ICD-10-CM

## 2024-07-01 RX ORDER — POTASSIUM CHLORIDE 750 MG/1
TABLET, FILM COATED, EXTENDED RELEASE ORAL
Qty: 30 TABLET | Refills: 0 | Status: SHIPPED | OUTPATIENT
Start: 2024-07-01

## 2024-07-01 RX ORDER — BUPROPION HYDROCHLORIDE 300 MG/1
TABLET ORAL
Qty: 30 TABLET | Refills: 0 | Status: SHIPPED | OUTPATIENT
Start: 2024-07-01

## 2024-07-02 DIAGNOSIS — Z79.01 CHRONIC ANTICOAGULATION: ICD-10-CM

## 2024-07-02 RX ORDER — APIXABAN 5 MG/1
TABLET, FILM COATED ORAL
Qty: 180 TABLET | Refills: 0 | Status: SHIPPED | OUTPATIENT
Start: 2024-07-02

## 2024-07-02 RX ORDER — AMLODIPINE BESYLATE 5 MG/1
TABLET ORAL
Qty: 90 TABLET | Refills: 0 | Status: SHIPPED | OUTPATIENT
Start: 2024-07-02

## 2024-07-10 ENCOUNTER — OFFICE VISIT (OUTPATIENT)
Dept: BEHAVIORAL HEALTH | Facility: CLINIC | Age: 79
End: 2024-07-10
Payer: MEDICARE

## 2024-07-10 DIAGNOSIS — F43.9 TRAUMA AND STRESSOR-RELATED DISORDER: ICD-10-CM

## 2024-07-10 DIAGNOSIS — F33.1 MODERATE EPISODE OF RECURRENT MAJOR DEPRESSIVE DISORDER: Primary | ICD-10-CM
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
## 2024-07-17 ENCOUNTER — OFFICE VISIT (OUTPATIENT)
Dept: BEHAVIORAL HEALTH | Facility: CLINIC | Age: 79
End: 2024-07-17
Payer: MEDICARE

## 2024-07-17 DIAGNOSIS — F41.1 GENERALIZED ANXIETY DISORDER: ICD-10-CM

## 2024-07-17 DIAGNOSIS — F43.9 TRAUMA AND STRESSOR-RELATED DISORDER: ICD-10-CM

## 2024-07-17 DIAGNOSIS — F33.0 MILD EPISODE OF RECURRENT MAJOR DEPRESSIVE DISORDER: Primary | ICD-10-CM
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
## 2024-07-24 ENCOUNTER — OFFICE VISIT (OUTPATIENT)
Dept: BEHAVIORAL HEALTH | Facility: CLINIC | Age: 79
End: 2024-07-24
Payer: MEDICARE

## 2024-07-24 DIAGNOSIS — F43.9 TRAUMA AND STRESSOR-RELATED DISORDER: ICD-10-CM

## 2024-07-24 DIAGNOSIS — F33.0 MILD EPISODE OF RECURRENT MAJOR DEPRESSIVE DISORDER: Primary | ICD-10-CM

## 2024-07-24 PROCEDURE — 90834 PSYTX W PT 45 MINUTES: CPT | Performed by: COUNSELOR
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
## 2024-07-26 DIAGNOSIS — I10 ESSENTIAL HYPERTENSION: ICD-10-CM

## 2024-07-26 RX ORDER — POTASSIUM CHLORIDE 750 MG/1
TABLET, FILM COATED, EXTENDED RELEASE ORAL
Qty: 30 TABLET | Refills: 0 | Status: SHIPPED | OUTPATIENT
Start: 2024-07-26

## 2024-07-29 ENCOUNTER — NURSE TRIAGE (OUTPATIENT)
Dept: CALL CENTER | Facility: HOSPITAL | Age: 79
End: 2024-07-29
Payer: MEDICARE

## 2024-07-29 NOTE — TELEPHONE ENCOUNTER
LIZ, Pt. having some chest pain. left arm hurts, just like when had heart attack, no other pain she says , then says except flaming sharp pain in chest sometimes. . I see Dr. Luis.Triage done, per protocol told needs to be seen in ER , pt. Is refusing ER, but caregiver is there with her will call her Supervisor and call EMS for her.

## 2024-07-29 NOTE — TELEPHONE ENCOUNTER
"Reason for Disposition   [1] Chest pain (or \"angina\") comes and goes AND [2] is happening more often (increasing in frequency) or getting worse (increasing in severity)  (Exception: Chest pains that last only a few seconds.)    Additional Information   Negative: SEVERE difficulty breathing (e.g., struggling for each breath, speaks in single words)   Negative: Difficult to awaken or acting confused (e.g., disoriented, slurred speech)   Negative: Shock suspected (e.g., cold/pale/clammy skin, too weak to stand, low BP, rapid pulse)   Negative: Passed out (i.e., lost consciousness, collapsed and was not responding)   Negative: [1] Chest pain lasts > 5 minutes AND [2] age > 44   Negative: [1] Chest pain lasts > 5 minutes AND [2] age > 30 AND [3] one or more cardiac risk factors (e.g., diabetes, high blood pressure, high cholesterol, smoker, or strong family history of heart disease)   Negative: [1] Chest pain lasts > 5 minutes AND [2] history of heart disease (i.e., angina, heart attack, heart failure, bypass surgery, takes nitroglycerin)   Negative: [1] Chest pain lasts > 5 minutes AND [2] described as crushing, pressure-like, or heavy   Negative: Heart beating < 50 beats per minute OR > 140 beats per minute   Negative: Visible sweat on face or sweat dripping down face   Negative: Sounds like a life-threatening emergency to the triager   Negative: Followed a chest injury   Negative: SEVERE chest pain   Negative: Pain also in shoulder(s) or arm(s) or jaw  (Exception: Pain is clearly made worse by movement.)   Negative: Difficulty breathing    Answer Assessment - Initial Assessment Questions  1. LOCATION: \"Where does it hurt?\"        Left arm pain, flaming streak across  2. RADIATION: \"Does the pain go anywhere else?\" (e.g., into neck, jaw, arms, back)      Left arm  3. ONSET: \"When did the chest pain begin?\" (Minutes, hours or days)       Friday on and off  4. PATTERN: \"Does the pain come and go, or has it been constant " "since it started?\"  \"Does it get worse with exertion?\"       Comes and goes  5. DURATION: \"How long does it last\" (e.g., seconds, minutes, hours)      Comes and goes arm pain last longer than chest pain  6. SEVERITY: \"How bad is the pain?\"  (e.g., Scale 1-10; mild, moderate, or severe)     - MILD (1-3): doesn't interfere with normal activities      - MODERATE (4-7): interferes with normal activities or awakens from sleep     - SEVERE (8-10): excruciating pain, unable to do any normal activities        Rated. 8  7. CARDIAC RISK FACTORS: \"Do you have any history of heart problems or risk factors for heart disease?\" (e.g., angina, prior heart attack; diabetes, high blood pressure, high cholesterol, smoker, or strong family history of heart disease)      Heart attack past  8. PULMONARY RISK FACTORS: \"Do you have any history of lung disease?\"  (e.g., blood clots in lung, asthma, emphysema, birth control pills)      no  9. CAUSE: \"What do you think is causing the chest pain?\"      heart  10. OTHER SYMPTOMS: \"Do you have any other symptoms?\" (e.g., dizziness, nausea, vomiting, sweating, fever, difficulty breathing, cough)        no  11. PREGNANCY: \"Is there any chance you are pregnant?\" \"When was your last menstrual period?\"        no    Protocols used: Chest Pain-ADULT-AH    "

## 2024-07-31 DIAGNOSIS — E03.9 ACQUIRED HYPOTHYROIDISM: ICD-10-CM

## 2024-07-31 RX ORDER — LEVOTHYROXINE, LIOTHYRONINE 57; 13.5 UG/1; UG/1
TABLET ORAL
Qty: 90 TABLET | Refills: 0 | Status: SHIPPED | OUTPATIENT
Start: 2024-07-31

## 2024-08-06 ENCOUNTER — PATIENT OUTREACH (OUTPATIENT)
Age: 79
End: 2024-08-06
Payer: MEDICARE

## 2024-08-06 NOTE — OUTREACH NOTE
SW attempted contact with UTRx1. SW will attempt again in a couple of business days.     Ivon STRAUSS -   Ambulatory Case Management    8/6/2024, 13:57 EDT

## 2024-08-07 ENCOUNTER — PATIENT OUTREACH (OUTPATIENT)
Age: 79
End: 2024-08-07
Payer: MEDICARE

## 2024-08-07 NOTE — OUTREACH NOTE
SW attempted to contact pt a second time, and scheduled a third call for one week out. SW will attempt again in a week.     Ivon STRAUSS -   Ambulatory Case Management    8/7/2024, 13:46 EDT

## 2024-08-13 DIAGNOSIS — F33.9 MAJOR DEPRESSION, RECURRENT, CHRONIC: ICD-10-CM

## 2024-08-14 ENCOUNTER — PATIENT OUTREACH (OUTPATIENT)
Age: 79
End: 2024-08-14
Payer: MEDICARE

## 2024-08-14 RX ORDER — BUPROPION HYDROCHLORIDE 300 MG/1
TABLET ORAL
Qty: 30 TABLET | Refills: 0 | Status: SHIPPED | OUTPATIENT
Start: 2024-08-14

## 2024-08-14 NOTE — OUTREACH NOTE
UTRx3. SW has attempted to contact pt with UTRx3. SW will D/c pt due to UTR, if no return response within a week. Pt may contact SW and received services, should they be needed in the future.     Ivon STRAUSS -   Ambulatory Case Management    8/14/2024, 12:46 EDT

## 2024-08-19 ENCOUNTER — TELEPHONE (OUTPATIENT)
Dept: FAMILY MEDICINE CLINIC | Facility: CLINIC | Age: 79
End: 2024-08-19

## 2024-08-21 DIAGNOSIS — Z78.9 IMPAIRED MOBILITY AND ADLS: Primary | ICD-10-CM

## 2024-08-21 DIAGNOSIS — Z74.09 IMPAIRED MOBILITY AND ADLS: Primary | ICD-10-CM

## 2024-08-21 DIAGNOSIS — Z91.81 AT MODERATE RISK FOR FALL: ICD-10-CM

## 2024-08-26 ENCOUNTER — PATIENT OUTREACH (OUTPATIENT)
Age: 79
End: 2024-08-26
Payer: MEDICARE

## 2024-08-26 NOTE — OUTREACH NOTE
UTRx4. SW to D/c due to UTRx4.     Ivon STRAUSS -   Ambulatory Case Management    8/26/2024, 14:06 EDT

## 2024-09-11 ENCOUNTER — OFFICE VISIT (OUTPATIENT)
Dept: BEHAVIORAL HEALTH | Facility: CLINIC | Age: 79
End: 2024-09-11
Payer: MEDICARE

## 2024-09-11 DIAGNOSIS — F41.1 GENERALIZED ANXIETY DISORDER: Primary | ICD-10-CM

## 2024-09-11 DIAGNOSIS — F33.0 MILD EPISODE OF RECURRENT MAJOR DEPRESSIVE DISORDER: ICD-10-CM

## 2024-09-11 DIAGNOSIS — F43.9 TRAUMA AND STRESSOR-RELATED DISORDER: ICD-10-CM

## 2024-09-16 DIAGNOSIS — F51.04 PSYCHOPHYSIOLOGICAL INSOMNIA: ICD-10-CM

## 2024-09-20 ENCOUNTER — PRIOR AUTHORIZATION (OUTPATIENT)
Dept: FAMILY MEDICINE CLINIC | Facility: CLINIC | Age: 79
End: 2024-09-20
Payer: MEDICARE

## 2024-09-28 DIAGNOSIS — I48.19 ATRIAL FIBRILLATION, PERSISTENT: ICD-10-CM

## 2024-09-30 RX ORDER — METOPROLOL SUCCINATE 200 MG/1
TABLET, EXTENDED RELEASE ORAL
Qty: 50 TABLET | Refills: 0 | Status: SHIPPED | OUTPATIENT
Start: 2024-09-30

## 2024-10-01 DIAGNOSIS — F33.9 MAJOR DEPRESSION, RECURRENT, CHRONIC: ICD-10-CM

## 2024-10-01 DIAGNOSIS — F41.1 GENERALIZED ANXIETY DISORDER: ICD-10-CM

## 2024-10-01 DIAGNOSIS — E03.9 ACQUIRED HYPOTHYROIDISM: ICD-10-CM

## 2024-10-01 DIAGNOSIS — K21.9 GASTROESOPHAGEAL REFLUX DISEASE WITHOUT ESOPHAGITIS: Chronic | ICD-10-CM

## 2024-10-01 DIAGNOSIS — Z79.01 CHRONIC ANTICOAGULATION: ICD-10-CM

## 2024-10-01 DIAGNOSIS — I87.2 CHRONIC VENOUS INSUFFICIENCY OF LOWER EXTREMITY: ICD-10-CM

## 2024-10-01 DIAGNOSIS — I50.32 CHRONIC DIASTOLIC (CONGESTIVE) HEART FAILURE: ICD-10-CM

## 2024-10-01 RX ORDER — BUPROPION HYDROCHLORIDE 300 MG/1
300 TABLET ORAL DAILY
Qty: 30 TABLET | Refills: 0 | Status: SHIPPED | OUTPATIENT
Start: 2024-10-01

## 2024-10-01 RX ORDER — APIXABAN 5 MG/1
5 TABLET, FILM COATED ORAL 2 TIMES DAILY
Qty: 180 TABLET | Refills: 0 | Status: SHIPPED | OUTPATIENT
Start: 2024-10-01

## 2024-10-01 RX ORDER — VENLAFAXINE 100 MG/1
100 TABLET ORAL 2 TIMES DAILY
Qty: 180 TABLET | Refills: 1 | Status: SHIPPED | OUTPATIENT
Start: 2024-10-01

## 2024-10-01 RX ORDER — FUROSEMIDE 20 MG
20 TABLET ORAL DAILY PRN
Qty: 90 TABLET | Refills: 1 | Status: SHIPPED | OUTPATIENT
Start: 2024-10-01

## 2024-10-01 RX ORDER — PANTOPRAZOLE SODIUM 40 MG/1
40 TABLET, DELAYED RELEASE ORAL DAILY
Qty: 90 TABLET | Refills: 0 | Status: SHIPPED | OUTPATIENT
Start: 2024-10-01

## 2024-10-02 RX ORDER — THYROID 90 MG/1
90 TABLET ORAL DAILY
Qty: 90 TABLET | Refills: 2 | Status: SHIPPED | OUTPATIENT
Start: 2024-10-02

## 2024-10-10 ENCOUNTER — PRIOR AUTHORIZATION (OUTPATIENT)
Dept: FAMILY MEDICINE CLINIC | Facility: CLINIC | Age: 79
End: 2024-10-10
Payer: MEDICARE

## 2024-10-10 NOTE — TELEPHONE ENCOUNTER
KEY- [id number]    This drug has been approved under the Member's Medicare Part D benefit. Approved   quantity: 60 units per 60 day(s). You may fill up to a 90 day supply.

## 2024-10-11 ENCOUNTER — OFFICE VISIT (OUTPATIENT)
Dept: FAMILY MEDICINE CLINIC | Facility: CLINIC | Age: 79
End: 2024-10-11
Payer: MEDICARE

## 2024-10-11 VITALS
SYSTOLIC BLOOD PRESSURE: 124 MMHG | DIASTOLIC BLOOD PRESSURE: 68 MMHG | WEIGHT: 207 LBS | BODY MASS INDEX: 31.37 KG/M2 | HEART RATE: 65 BPM | HEIGHT: 68 IN | OXYGEN SATURATION: 96 %

## 2024-10-11 DIAGNOSIS — F33.9 MAJOR DEPRESSION, RECURRENT, CHRONIC: ICD-10-CM

## 2024-10-11 DIAGNOSIS — E03.9 ACQUIRED HYPOTHYROIDISM: Primary | ICD-10-CM

## 2024-10-11 DIAGNOSIS — K21.9 GASTROESOPHAGEAL REFLUX DISEASE WITHOUT ESOPHAGITIS: Chronic | ICD-10-CM

## 2024-10-11 DIAGNOSIS — I10 ESSENTIAL HYPERTENSION: ICD-10-CM

## 2024-10-11 DIAGNOSIS — I50.32 CHRONIC DIASTOLIC (CONGESTIVE) HEART FAILURE: ICD-10-CM

## 2024-10-11 DIAGNOSIS — I48.19 ATRIAL FIBRILLATION, PERSISTENT: ICD-10-CM

## 2024-10-11 PROCEDURE — 1159F MED LIST DOCD IN RCRD: CPT | Performed by: FAMILY MEDICINE

## 2024-10-11 PROCEDURE — G2211 COMPLEX E/M VISIT ADD ON: HCPCS | Performed by: FAMILY MEDICINE

## 2024-10-11 PROCEDURE — 99214 OFFICE O/P EST MOD 30 MIN: CPT | Performed by: FAMILY MEDICINE

## 2024-10-11 PROCEDURE — 1125F AMNT PAIN NOTED PAIN PRSNT: CPT | Performed by: FAMILY MEDICINE

## 2024-10-11 PROCEDURE — 3074F SYST BP LT 130 MM HG: CPT | Performed by: FAMILY MEDICINE

## 2024-10-11 PROCEDURE — 3078F DIAST BP <80 MM HG: CPT | Performed by: FAMILY MEDICINE

## 2024-10-11 PROCEDURE — 1160F RVW MEDS BY RX/DR IN RCRD: CPT | Performed by: FAMILY MEDICINE

## 2024-10-11 RX ORDER — METOPROLOL SUCCINATE 200 MG/1
200 TABLET, EXTENDED RELEASE ORAL DAILY
Qty: 50 TABLET | Refills: 0 | Status: SHIPPED | OUTPATIENT
Start: 2024-10-11

## 2024-10-11 RX ORDER — LISINOPRIL 40 MG/1
40 TABLET ORAL DAILY
Qty: 90 TABLET | Refills: 0 | Status: SHIPPED | OUTPATIENT
Start: 2024-10-11

## 2024-10-11 RX ORDER — BUPROPION HYDROCHLORIDE 300 MG/1
300 TABLET ORAL DAILY
Qty: 30 TABLET | Refills: 0 | Status: SHIPPED | OUTPATIENT
Start: 2024-10-11

## 2024-10-11 RX ORDER — AMLODIPINE BESYLATE 5 MG/1
5 TABLET ORAL DAILY
Qty: 90 TABLET | Refills: 0 | Status: SHIPPED | OUTPATIENT
Start: 2024-10-11

## 2024-10-11 RX ORDER — NYSTATIN 100000 [USP'U]/G
POWDER TOPICAL 4 TIMES DAILY
Qty: 120 G | Refills: 1 | Status: SHIPPED | OUTPATIENT
Start: 2024-10-11

## 2024-10-11 NOTE — PROGRESS NOTES
"                      Established Patient        Chief Complaint:   Chief Complaint   Patient presents with    Hypertension    Congestive Heart Failure    Hypothyroidism        Millie Richardson is a 79 y.o. female    History of Present Illness:   Here today in scheduled follow-up visit of her mood disorder, atrial fibrillation, hypothyroidism, GERD, hypertension and chronic diastolic CHF.    Patient denies any problems with her current medication regimen    Denies chest pain, syncope, palpitations or vertigo.  Denies fever, chills or night sweats.  Maintains an active lifestyle, balanced dietary intake; reports good hydration habits.  Denies hematuria/dysuria.  Denies any BRB/BTS.  No new rashes.  Denies orthopnea, epistaxis or hemoptysis.    Subjective     The following portions of the patient's history were reviewed and updated as appropriate: allergies, current medications, past family history, past medical history, past social history, past surgical history and problem list.    Allergies   Allergen Reactions    Nitrous Oxide Swelling    Penicillins Anaphylaxis and Swelling    Procaine Nausea Only    Sulfa Antibiotics Other (See Comments)     Patient states \"it made me want to go to bed, I didn't have a fever but I felt like I did\"        Review of Systems  Constitutional: Negative for fever. Negative for chills, diaphoresis; malaise/fatigue of chronic nature, no appreciable weight change of unintended nature.  HENT: No dysphagia; no changes to vision/hearing/smell/taste; no epistaxis  Eyes: Negative for redness and visual disturbance.   Respiratory: No hemoptysis or cough reported.  Cardiovascular: Negative for chest pain; periodic symptomatic palpitations.  Gastrointestinal: Negative for abdominal distention, abdominal pain and blood in stool.   Endocrine: Negative for cold intolerance and heat intolerance.   Genitourinary: Negative for difficulty urinating, dysuria and frequency.   Musculoskeletal: Chronic " "arthralgias, back pain and myalgias.  Patient does report easy fatigability, at times.  Skin: No new rashes or lesions.  Neurological: No syncopal episodes.  Generalized weakness of chronic nature.  Hematological: Negative for adenopathy. Does not bruise/bleed easily.   Psychiatric/Behavioral: Negative for confusion. The patient is not nervous/anxious.    Objective     Physical Exam   Vital Signs: /68   Pulse 65   Ht 172.7 cm (68\")   Wt 93.9 kg (207 lb)   LMP  (LMP Unknown)   SpO2 96%   BMI 31.47 kg/m²   BMI is >= 25 and <30. (Overweight) The following options were offered after discussion;: exercise counseling/recommendations and nutrition counseling/recommendations    General Appearance: alert, oriented x 3, no acute distress.  Pleasant and interactive during questioning/examination.  Skin: warm and dry.  Without jaundice.  HEENT: Atraumatic.  pupils round and reactive to light and accommodation, oral mucosa pink and moist.  Nares patent without epistaxis.  External auditory canals are patent tympanic membranes intact.  Neck: supple, no JVD, trachea midline.  No thyromegaly  Lungs: Audible air exchange noted all lung fields, unlabored breathing effort.  Prolonged expiratory phase bilaterally, rhonchus and referred upper airway congestion to bilateral lungs, cleared with light cough.  Heart: RRR, normal S1 and S2, no S3, no rub.  Chronic heart murmurs.  Abdomen: soft, non-tender, no palpable bladder, present bowel sounds to auscultation ×4.  No guarding or rigidity.  Extremities: no clubbing, cyanosis.  Good range of motion actively and passively.  Symmetric muscle strength and development.  Ambulates with rollator.  Chronic gravity dependent nonpitting edema of the lower extremities.  Neuro: normal speech and mental status.  Cranial nerves II through XII intact.  No anosmia. DTR 2+; proprioception intact.  No focal motor/sensory deficits.    Advance Care Planning   ACP discussion was held with the " patient during this visit. Patient does not have an advance directive, information provided.         Assessment and Plan      Assessment/Plan:   Diagnoses and all orders for this visit:    1. Acquired hypothyroidism (Primary)    2. Major depression, recurrent, chronic  -     buPROPion XL (WELLBUTRIN XL) 300 MG 24 hr tablet; Take 1 tablet by mouth Daily.  Dispense: 30 tablet; Refill: 0    3. Essential hypertension  -     amLODIPine (NORVASC) 5 MG tablet; Take 1 tablet by mouth Daily.  Dispense: 90 tablet; Refill: 0  -     lisinopril (PRINIVIL,ZESTRIL) 40 MG tablet; Take 1 tablet by mouth Daily.  Dispense: 90 tablet; Refill: 0    4. Atrial fibrillation, persistent  -     metoprolol succinate XL (TOPROL-XL) 200 MG 24 hr tablet; Take 1 tablet by mouth Daily.  Dispense: 50 tablet; Refill: 0    5. Gastroesophageal reflux disease without esophagitis    6. Chronic diastolic (congestive) heart failure    Other orders  -     nystatin (MYCOSTATIN) 006526 UNIT/GM powder; Apply  topically to the appropriate area as directed 4 (Four) Times a Day.  Dispense: 120 g; Refill: 1      Thyroid labs today; dose adjustment if indicated.    VSS, appears HD asymptomatic.  Continue current dose of amlodipine and lisinopril.  Heart rate well-controlled, continue metoprolol use.  Keep scheduled follow-up appointment with cardiology.  Continue frequent weight evaluations, as well as avoid excessive salt/sodium dietary intake.    Discussed need for stress/anxiety reducing techniques such as prayer/meditation/breathing and counting exercises and avoidance of stress producing environments/situations; will follow clinically.  Refill provided for bupropion.    Refill provided for nystatin powder.    Anti - reflux measures, trigger foods and drinks to avoid: Fatty foods, alcohol, chocolate, coffee, tea, caffeinated soft drinks (decaffeinated coffee still has some caffeine), peppermint and spearmint, spices and vinegar, citrus fruits and juices,  tomatoes and tomato sauces, and smoking. Other antireflux measures include weight reduction if overweight, avoid tight clothing around the abdomen, elevate the head of her bed 6 inches (May use a bed wedge which is placed between the mattress in box [place]) or blocks under the head of the bed, don't drink or eat for 2 hours before going to bed and avoid lying down immediately after meals.      Discussion Summary:    Discussed plan of care in detail with pt today; pt verb understanding and agrees.    I spent 35 minutes caring for Millie on this date of service. This time includes time spent by me in the following activities:preparing for the visit, performing a medically appropriate examination and/or evaluation , counseling and educating the patient/family/caregiver, ordering medications, tests, or procedures, documenting information in the medical record, independently interpreting results and communicating that information with the patient/family/caregiver, and care coordination    I confirm accuracy of unchanged data/findings which have been carried forward from previous visit, as well as I have updated appropriately those that have changed.      Follow up:  Return in about 4 months (around 2/11/2025) for Recheck.     There are no Patient Instructions on file for this visit.    Rajan Luis,   10/25/24  08:03 EDT

## 2024-10-25 ENCOUNTER — TELEPHONE (OUTPATIENT)
Dept: FAMILY MEDICINE CLINIC | Facility: CLINIC | Age: 79
End: 2024-10-25
Payer: MEDICARE

## 2024-10-25 NOTE — TELEPHONE ENCOUNTER
----- Message from Rajan Luis sent at 10/25/2024  8:06 AM EDT -----  Millie,    Your thyroid labs are appropriate, no dosing adjustment needed at this time.

## 2024-11-14 DIAGNOSIS — Z78.9 IMPAIRED MOBILITY AND ADLS: ICD-10-CM

## 2024-11-14 DIAGNOSIS — Z74.09 IMPAIRED MOBILITY AND ADLS: ICD-10-CM

## 2024-11-14 DIAGNOSIS — G90.9 AUTONOMIC INSTABILITY: ICD-10-CM

## 2024-11-14 RX ORDER — CALCIUM CARBONATE 160(400)MG
1 TABLET,CHEWABLE ORAL DAILY
Qty: 1 EACH | Refills: 0 | Status: SHIPPED | OUTPATIENT
Start: 2024-11-14

## 2024-12-03 DIAGNOSIS — F33.9 MAJOR DEPRESSION, RECURRENT, CHRONIC: ICD-10-CM

## 2024-12-03 RX ORDER — BUPROPION HYDROCHLORIDE 300 MG/1
300 TABLET ORAL DAILY
Qty: 30 TABLET | Refills: 0 | Status: SHIPPED | OUTPATIENT
Start: 2024-12-03

## 2024-12-27 ENCOUNTER — TELEPHONE (OUTPATIENT)
Age: 79
End: 2024-12-27

## 2024-12-27 NOTE — TELEPHONE ENCOUNTER
Caller: DylanMillie    Relationship: Self    Best call back number: [phone]     What form or medical record are you requesting: LETTER STATING SHE CAN COME OFF [facility] FOR HER PROCEDURE FOR 4 DAYS    Who is requesting this form or medical record from you: DR MATT    How would you like to receive the form or medical records (pick-up, mail, fax): FAX  If fax, what is the fax number: Timeframe paperwork needed: ASAP    Additional notes: CALL WHEN SENT

## 2024-12-30 NOTE — TELEPHONE ENCOUNTER
Provider: XUAN COLLIER  Caller: ESTUARDO  Relationship to Patient: [facility]  Pharmacy: NA  Phone Number: [phone]  Reason for Call:     PAT AT [facility] CALLED TO LET DOCTOR AMIRA KNOW THAT PATIENT IS BEING DISCHARGED FROM [facility] 7/13 WITH ALL GOALS MET.     PAT STATED THAT PATIENT STILL HAS RESIDUAL SWELLING.  PAT STATED THAT PATIENT HAS SITTERS, AND HER MEDICATIONS ARE ALSO BEING FILLED.       Resident

## 2025-01-07 DIAGNOSIS — F33.9 MAJOR DEPRESSION, RECURRENT, CHRONIC: ICD-10-CM

## 2025-01-07 RX ORDER — BUPROPION HYDROCHLORIDE 300 MG/1
300 TABLET ORAL DAILY
Qty: 30 TABLET | Refills: 0 | Status: SHIPPED | OUTPATIENT
Start: 2025-01-07

## 2025-01-14 ENCOUNTER — TELEPHONE (OUTPATIENT)
Age: 80
End: 2025-01-14
Payer: MEDICARE

## 2025-01-27 DIAGNOSIS — I48.21 PERMANENT ATRIAL FIBRILLATION: ICD-10-CM

## 2025-01-27 DIAGNOSIS — I48.19 ATRIAL FIBRILLATION, PERSISTENT: ICD-10-CM

## 2025-01-27 NOTE — TELEPHONE ENCOUNTER
Caller: Millie Richardson    Relationship: Self    Best call back number: [phone]     Requested Prescriptions:   Requested Prescriptions     Pending Prescriptions Disp Refills    metoprolol succinate XL (TOPROL-XL) 200 MG 24 hr tablet 50 tablet 0     Sig: Take 1 tablet by mouth Daily.    metoprolol succinate XL (TOPROL-XL) 100 MG 24 hr tablet 180 tablet 1     Sig: Take 2 tablets by mouth 2 (Two) Times a Day.        Pharmacy where request should be sent: [facility] - [place] - [address] [place] [address] - [phone]  - [phone] FX     Last office visit with prescribing clinician: 10/11/2024   Last telemedicine visit with prescribing clinician: Visit date not found   Next office visit with prescribing clinician: 2/14/2025

## 2025-01-28 RX ORDER — METOPROLOL SUCCINATE 200 MG/1
200 TABLET, EXTENDED RELEASE ORAL DAILY
Qty: 50 TABLET | Refills: 0 | Status: SHIPPED | OUTPATIENT
Start: 2025-01-28

## 2025-01-28 RX ORDER — METOPROLOL SUCCINATE 100 MG/1
200 TABLET, EXTENDED RELEASE ORAL 2 TIMES DAILY
Qty: 180 TABLET | Refills: 1 | Status: SHIPPED | OUTPATIENT
Start: 2025-01-28

## 2025-01-28 NOTE — TELEPHONE ENCOUNTER
Rx Refill Note  Requested Prescriptions     Pending Prescriptions Disp Refills    metoprolol succinate XL (TOPROL-XL) 200 MG 24 hr tablet 50 tablet 0     Sig: Take 1 tablet by mouth Daily.    metoprolol succinate XL (TOPROL-XL) 100 MG 24 hr tablet 180 tablet 1     Sig: Take 2 tablets by mouth 2 (Two) Times a Day.      Last office visit with prescribing clinician: 10/11/2024   Last telemedicine visit with prescribing clinician: Visit date not found   Next office visit with prescribing clinician: 1/28/2025                         Would you like a call back once the refill request has been completed: [] Yes [] No    If the office needs to give you a call back, can they leave a voicemail: [] Yes [] No    Astrid Roblero MA  01/28/25, 16:36 EST

## 2025-01-29 ENCOUNTER — TELEPHONE (OUTPATIENT)
Age: 80
End: 2025-01-29
Payer: MEDICARE

## 2025-01-29 NOTE — TELEPHONE ENCOUNTER
[facility] CALLED, TWO DIFFERENT PRESCRIPTIONS WERE CALLED IN AND THERE'S A DISCREPANCY - PLEASE GIVE THEM A CALL TO CLARIFY ASAP NEEDS TO BE SENT OUT - CARRIER WAS THERE AT THE TIME TO  MAIL.

## 2025-02-10 DIAGNOSIS — F33.9 MAJOR DEPRESSION, RECURRENT, CHRONIC: ICD-10-CM

## 2025-02-11 RX ORDER — BUPROPION HYDROCHLORIDE 300 MG/1
300 TABLET ORAL DAILY
Qty: 30 TABLET | Refills: 0 | Status: SHIPPED | OUTPATIENT
Start: 2025-02-11

## 2025-02-11 NOTE — TELEPHONE ENCOUNTER
Rx Refill Note  Requested Prescriptions     Pending Prescriptions Disp Refills    buPROPion XL (WELLBUTRIN XL) 300 MG 24 hr tablet [Pharmacy Med Name: bupropion HCl  mg 24 hr tablet, extended release] 30 tablet 0     Sig: TAKE ONE TABLET BY MOUTH ONCE DAILY      Last office visit with prescribing clinician: 10/11/2024   Last telemedicine visit with prescribing clinician: Visit date not found   Next office visit with prescribing clinician: 2/14/2025                         Would you like a call back once the refill request has been completed: [] Yes [] No    If the office needs to give you a call back, can they leave a voicemail: [] Yes [] No    Astrid Roblero MA  02/11/25, 09:18 EST

## 2025-02-14 ENCOUNTER — OFFICE VISIT (OUTPATIENT)
Age: 80
End: 2025-02-14
Payer: MEDICARE

## 2025-02-14 VITALS
TEMPERATURE: 97.5 F | SYSTOLIC BLOOD PRESSURE: 155 MMHG | OXYGEN SATURATION: 96 % | HEART RATE: 72 BPM | WEIGHT: 208.8 LBS | DIASTOLIC BLOOD PRESSURE: 77 MMHG | BODY MASS INDEX: 31.75 KG/M2

## 2025-02-14 DIAGNOSIS — J45.40 MODERATE PERSISTENT REACTIVE AIRWAY DISEASE WITHOUT COMPLICATION: ICD-10-CM

## 2025-02-14 DIAGNOSIS — M17.0 TRICOMPARTMENT OSTEOARTHRITIS OF BOTH KNEES: ICD-10-CM

## 2025-02-14 DIAGNOSIS — I10 ESSENTIAL HYPERTENSION: ICD-10-CM

## 2025-02-14 DIAGNOSIS — Z78.9 IMPAIRED MOBILITY AND ADLS: Primary | ICD-10-CM

## 2025-02-14 DIAGNOSIS — Z74.09 IMPAIRED MOBILITY AND ADLS: Primary | ICD-10-CM

## 2025-02-14 DIAGNOSIS — K21.9 GASTROESOPHAGEAL REFLUX DISEASE WITHOUT ESOPHAGITIS: Chronic | ICD-10-CM

## 2025-02-14 DIAGNOSIS — I50.32 CHRONIC DIASTOLIC (CONGESTIVE) HEART FAILURE: ICD-10-CM

## 2025-02-14 DIAGNOSIS — I48.21 PERMANENT ATRIAL FIBRILLATION: ICD-10-CM

## 2025-02-14 DIAGNOSIS — F41.8 DEPRESSION WITH ANXIETY: ICD-10-CM

## 2025-02-14 DIAGNOSIS — Z79.01 CHRONIC ANTICOAGULATION: ICD-10-CM

## 2025-02-14 DIAGNOSIS — E03.9 ACQUIRED HYPOTHYROIDISM: ICD-10-CM

## 2025-02-14 PROCEDURE — 3078F DIAST BP <80 MM HG: CPT | Performed by: FAMILY MEDICINE

## 2025-02-14 PROCEDURE — 99214 OFFICE O/P EST MOD 30 MIN: CPT | Performed by: FAMILY MEDICINE

## 2025-02-14 PROCEDURE — 1125F AMNT PAIN NOTED PAIN PRSNT: CPT | Performed by: FAMILY MEDICINE

## 2025-02-14 PROCEDURE — 3077F SYST BP >= 140 MM HG: CPT | Performed by: FAMILY MEDICINE

## 2025-02-14 RX ORDER — FLUTICASONE FUROATE, UMECLIDINIUM BROMIDE AND VILANTEROL TRIFENATATE 100; 62.5; 25 UG/1; UG/1; UG/1
1 POWDER RESPIRATORY (INHALATION)
Qty: 28 EACH | Refills: 0 | COMMUNITY
Start: 2025-02-14 | End: 2025-03-07 | Stop reason: SDUPTHER

## 2025-02-22 DIAGNOSIS — F51.04 PSYCHOPHYSIOLOGICAL INSOMNIA: ICD-10-CM

## 2025-02-24 NOTE — TELEPHONE ENCOUNTER
Rx Refill Note  Requested Prescriptions     Pending Prescriptions Disp Refills    amitriptyline (ELAVIL) 25 MG tablet [Pharmacy Med Name: amitriptyline 25 mg tablet] 60 tablet 3     Sig: TAKE 1 OR 2 TABLETS BY MOUTH AT BEDTIME      Last office visit with prescribing clinician: 2/14/2025   Last telemedicine visit with prescribing clinician: Visit date not found   Next office visit with prescribing clinician: 3/21/2025                         Would you like a call back once the refill request has been completed: [] Yes [] No    If the office needs to give you a call back, can they leave a voicemail: [] Yes [] No    Astrid Roblero MA  02/24/25, 09:43 EST

## 2025-02-25 DIAGNOSIS — I10 ESSENTIAL HYPERTENSION: ICD-10-CM

## 2025-02-25 RX ORDER — AMLODIPINE BESYLATE 5 MG/1
5 TABLET ORAL DAILY
Qty: 90 TABLET | Refills: 3 | Status: SHIPPED | OUTPATIENT
Start: 2025-02-25

## 2025-02-25 NOTE — TELEPHONE ENCOUNTER
Rx Refill Note  Requested Prescriptions     Pending Prescriptions Disp Refills    amLODIPine (NORVASC) 5 MG tablet [Pharmacy Med Name: amlodipine 5 mg tablet] 90 tablet 0     Sig: TAKE ONE TABLET BY MOUTH ONCE DAILY      Last office visit with prescribing clinician: 2/14/2025   Last telemedicine visit with prescribing clinician: Visit date not found   Next office visit with prescribing clinician: 3/21/2025                         Would you like a call back once the refill request has been completed: [] Yes [] No    If the office needs to give you a call back, can they leave a voicemail: [] Yes [] No    Lynn Caballero MA  02/25/25, 14:47 EST

## 2025-02-27 ENCOUNTER — TELEPHONE (OUTPATIENT)
Age: 80
End: 2025-02-27
Payer: MEDICARE

## 2025-02-27 NOTE — TELEPHONE ENCOUNTER
Caller: MAYUR STREETER    Relationship:     Callback number: [phone]   Is it ok to leave a message: [x] Yes [] No    Requested medication for samples: SAMPLE INHALER, GERONIMO LOST THE ONE YOU PREVIOUSLY GAVE HER.    How much medication does the patient currently have left: NONE    Who will be picking up the samples: GERONIMO WILL PICK IT UP.    PLEASE CALL HER WHEN THIS IS READY TO

## 2025-03-03 PROBLEM — J45.909 REACTIVE AIRWAY DISEASE: Status: ACTIVE | Noted: 2025-03-03

## 2025-03-04 NOTE — PROGRESS NOTES
"                      Established Patient        Chief Complaint:   Chief Complaint   Patient presents with    Shortness of Breath        Millie Richardson is a 79 y.o. female    History of Present Illness:   Here today in scheduled follow-up visit of impaired mobility/ADLs, GERD, hypothyroidism, chronic diastolic CHF, hypertension, permanent atrial fibrillation, mood disorder, chronic anticoagulation and osteoarthritis of bilateral knees.      Denies chest pain, syncope, palpitations or vertigo.  Denies fever, chills or night sweats.  Maintains an active lifestyle, balanced dietary intake; reports good hydration habits.  Denies hematuria/dysuria.  Denies any BRB/BTS.  No new rashes.  Denies orthopnea, epistaxis or hemoptysis.        Subjective     The following portions of the patient's history were reviewed and updated as appropriate: allergies, current medications, past family history, past medical history, past social history, past surgical history and problem list.    Allergies   Allergen Reactions    Nitrous Oxide Swelling    Penicillins Anaphylaxis and Swelling    Procaine Nausea Only    Sulfa Antibiotics Other (See Comments)     Patient states \"it made me want to go to bed, I didn't have a fever but I felt like I did\"        Review of Systems  Constitutional: Negative for fever. Negative for chills, diaphoresis; malaise/fatigue of chronic nature, no appreciable weight change of unintended nature.  HENT: No dysphagia; no changes to vision/hearing/smell/taste; no epistaxis  Eyes: Negative for redness and visual disturbance.   Respiratory: No hemoptysis; cough with clear/yellow phlegm at times.  Cardiovascular: Negative for chest pain; periodic symptomatic palpitations.  Gastrointestinal: Negative for abdominal distention, abdominal pain and blood in stool.   Endocrine: Negative for cold intolerance and heat intolerance.   Genitourinary: Negative for difficulty urinating, dysuria and frequency.   Musculoskeletal: " Chronic arthralgias, back pain and myalgias.  Patient does report easy fatigability, at times.  Skin: No new rashes or lesions.  Neurological: No syncopal episodes.  Generalized weakness of chronic nature.  Hematological: Negative for adenopathy. Does not bruise/bleed easily.   Psychiatric/Behavioral: Negative for confusion. The patient is not nervous/anxious.    Objective     Physical Exam   Vital Signs: /77 (BP Location: Right arm)   Pulse 72   Temp 97.5 °F (36.4 °C) (Temporal)   Wt 94.7 kg (208 lb 12.8 oz)   LMP  (LMP Unknown)   SpO2 96%   BMI 31.75 kg/m²   BMI is >= 25 and <30. (Overweight) The following options were offered after discussion;: exercise counseling/recommendations and nutrition counseling/recommendations    General Appearance: alert, oriented x 3, no acute distress.  Pleasant and interactive during questioning/examination.  Skin: warm and dry.  Without jaundice.  HEENT: Atraumatic.  pupils round and reactive to light and accommodation, oral mucosa pink and moist.  Nares patent without epistaxis.  External auditory canals are patent tympanic membranes intact.  Neck: supple, no JVD, trachea midline.  No thyromegaly  Lungs: Audible air exchange noted all lung fields, unlabored breathing effort.  Prolonged expiratory phase bilaterally, rhonchus and referred upper airway congestion to bilateral lungs, cleared with light cough.  Heart: RRR, normal S1 and S2, no S3, no rub.  Chronic heart murmurs.  Abdomen: soft, non-tender, no palpable bladder, present bowel sounds to auscultation ×4.  No guarding or rigidity.  Extremities: no clubbing, cyanosis.  Good range of motion actively and passively.  Symmetric muscle strength and development.  Ambulates with rollator.  Chronic gravity dependent nonpitting edema of the lower extremities.  Neuro: normal speech and mental status.  Cranial nerves II through XII intact.  No anosmia. DTR 2+; proprioception intact.  No focal motor/sensory  deficits.    Advance Care Planning   ACP discussion was held with the patient during this visit. Patient does not have an advance directive, information provided.         Assessment and Plan      Assessment/Plan:   Diagnoses and all orders for this visit:    1. Impaired mobility and ADLs (Primary)    2. Gastroesophageal reflux disease without esophagitis    3. Acquired hypothyroidism    4. Chronic diastolic (congestive) heart failure    5. Essential hypertension    6. Permanent atrial fibrillation    7. Moderate persistent reactive airway disease without complication  -     Fluticasone-Umeclidin-Vilant (Trelegy Ellipta) 100-62.5-25 MCG/ACT inhaler; Inhale 1 puff Daily.  Dispense: 28 each; Refill: 0    8. Depression with anxiety    9. Tricompartment osteoarthritis of both knees    10. Chronic anticoagulation      Vital signs demonstrate hemodynamic stability.  No findings suggestive of unstable angina.    No increased work of breathing, although her symptoms are likely associated to expected changes of persistent reactive airway disease.  I have recommended trial usages of once daily Trelegy.  Sample provided in office today.  I have asked that she notify the office should she develop any ill effects to the new medication.    Discussed need for stress/anxiety reducing techniques such as prayer/meditation/breathing and counting exercises and avoidance of stress producing environments/situations; will follow clinically.    Continue low-sodium/salt dietary intake.  Demonstrates no findings of acute volume overload.    Continue thyroid supplementation, periodic thyroid function studies with dose adjustment when indicated.      Discussion Summary:    Discussed plan of care in detail with pt today; pt verb understanding and agrees.    I spent 30 minutes caring for Millie on this date of service. This time includes time spent by me in the following activities:preparing for the visit, performing a medically appropriate  examination and/or evaluation , counseling and educating the patient/family/caregiver, ordering medications, tests, or procedures, documenting information in the medical record, and care coordination    I confirm accuracy of unchanged data/findings which have been carried forward from previous visit, as well as I have updated appropriately those that have changed.        Follow up:  Return in about 2 weeks (around 2/28/2025) for Recheck, Med Change/New Meds.     There are no Patient Instructions on file for this visit.    Rajan Luis DO  03/03/25  21:31 EST

## 2025-03-07 DIAGNOSIS — J45.40 MODERATE PERSISTENT REACTIVE AIRWAY DISEASE WITHOUT COMPLICATION: ICD-10-CM

## 2025-03-07 RX ORDER — FLUTICASONE FUROATE, UMECLIDINIUM BROMIDE AND VILANTEROL TRIFENATATE 100; 62.5; 25 UG/1; UG/1; UG/1
1 POWDER RESPIRATORY (INHALATION)
Qty: 28 EACH | Refills: 0 | Status: SHIPPED | OUTPATIENT
Start: 2025-03-07

## 2025-03-07 NOTE — TELEPHONE ENCOUNTER
[facility] Hospitalist Team  Progress Note    Sammy Diggs Patient Status:  Inpatient    1956 MRN [id number]   Location [facility] CRITICAL CARE UNIT 3 Attending Leyla Moss MD   Hosp Day # 6 PCP Fritz Hernandez DO     CC: Follow Up  PCP: Fritz Hernandez DO    ASSESSMENT/PLAN:     64 yo M w/ PMH DM, CKD, CAD who presented w/ hypoglycemia, then found to be hypoxic, hypotensive on presentation    ID:  Septic shock w/ gram positive bacteremia  - source: unclear -  vs. Pulmonary vs. Skin/bone  - blood cx w. enterococcous avium  - repeat blood cx from 3/14 - NTD  - ID consult  - abx: Zosyn -> unasyn  - urine cx w/ less than 10K GNR  - lactic acidosis improve, shock resolved    Sacral and heel wounds (POA) w/ concern for L. Heel osteomyelitis  - wound care   - XR L. Heel concerning for osteomyelitis  - bone scan w/ infection vs. Inflammatory vs. Degenerative -> along w/ xr findings consistent w/ osteomyelitis  - podiatry consult, appreciate recs -> discuss debridement vs. Abx. Pt hesitant to proceed w/ surg. Plan IV abx and close outpt f/u  - currently on unasyn w/ plan for total 6 weeks tx. PICC in place.    cards  Paroxysmal Afib w/ RVR  - secondary to above  - on PO amiodarone  - metoprolol - dose titration per cards  - also on cardizem at home - resume when BP stable  - cards following, appreciate recs  - anticoag: eliquis    Cardiomyopathy  - Echo w/ EF 35% which is down from Echo from   - this is in setting of sepsis/Afib w/ RVR  - med adjustment per cards  - on BB, eventually need to add ACEI/ARB  - diuresis per cards - currently on IV lasix    PAD - arterial US w/ PAD. Cards following. Cards discussed peripheral angiogram w/ pt -> pt prefers to avoid further procedure. Treating osteomyelitis as above. If condition worsens/difficulty healing -> consider angiography then    LV thrombus: possible thrombus noted on echo. On eliquis.    CAD w/ hx of cardiac arrest/ HTN/ HL - resume home  Rx Refill Note  Requested Prescriptions     Pending Prescriptions Disp Refills    Fluticasone-Umeclidin-Vilant (Trelegy Ellipta) 100-62.5-25 MCG/ACT inhaler 28 each 0     Sig: Inhale 1 puff Daily.      Last office visit with prescribing clinician: 2/14/2025   Last telemedicine visit with prescribing clinician: Visit date not found   Next office visit with prescribing clinician: 3/21/2025                         Would you like a call back once the refill request has been completed: [] Yes [] No    If the office needs to give you a call back, can they leave a voicemail: [] Yes [] No    Astrid Roblero MA  03/07/25, 08:16 EST   meds    Pulm  Acute vs chronic hypoxic respiratory failure-suspect multifactorial   - underlying ILD, per recent pulm note has ILD and was on o2 but pt states was weaned off prior to this past week  - monitor O2 need  - outpt fu pulm and PFTs as previously recommended  - speech eval, pt refused VFSS  - discuss concern for aspiration w/ pt - he adamantly refused to have modified diet. I have discussed this with him multiple times including today. He expresses understanding of aspiration risk.   - monitor resp status    Endo  Hypoglycemia w/ hx of DM  - A1c 5.8  - on insulin/ozempic at home - hold for now  - readd low dose SSI and monitor  - continue insulin glargine at current dose. Monitor. Adjust as needed.    renal  STEVO on CKD 3  - Cr 1.4 in 12/2021  - initial STEVO due to sepsis, Cr now downtrened     Anemia - likleyl anemia of chronic disease. No active bleeding. Monitor.    Severe protein calorie malnutrition - per RD    MSK  Deconditioning  - PT/OT  - PMR -> acute vs. KRYSTAL depends on how pt can tolerate therapy. This was discussed w/ pt that if he truly is motivated to go to acute rehab, he needs to prove that he can actually tolerate therapy by participating in therapy in house.    FEN  Diet as tolerated    Prophy  -SCD  -eliquis      Dispo  -pending clinical course    ADOD: likely medically ready for dc in 1-2 days        PCP: Fritz Hernandez DO      Concerns regarding plan of care were discussed with patient. Patient agrees with plan as detailed above.         SUBJECTIVE:     States he has some cough and that cough needs to be treated before he can be discharged.  Otherwise, he feels ok      Ros: as above      OBJECTIVE:   Blood pressure 110/80, pulse 99, temperature 96.8 °F (36 °C), temperature source Temporal, resp. rate (!) 24, height 6' 3\" (1.905 m), weight 113.6 kg (250 lb 7.1 oz), SpO2 94 %.    GENERAL: no apparent distress  NEURO: A/A conversational, following commands  RESP: non labored, good air  entry. No acute distress.  CARDIO: irregularly irregular. Tachycardic.  ABD: soft, NT, ND, BS+  EXTREMITIES: 1+ edema. Right ft amputation. Left heel wound in dressing.  PSYCH: calm at this time    DIAGNOSTIC DATA:     Recent Results (from the past 24 hour(s))   GLUCOSE, BEDSIDE - POINT OF CARE    Collection Time: 03/17/22  4:09 PM   Result Value Ref Range    GLUCOSE, BEDSIDE - POINT OF CARE 88 70 - 99 mg/dL   GLUCOSE, BEDSIDE - POINT OF CARE    Collection Time: 03/17/22  8:46 PM   Result Value Ref Range    GLUCOSE, BEDSIDE - POINT OF CARE 137 (H) 70 - 99 mg/dL   Potassium    Collection Time: 03/18/22  5:56 AM   Result Value Ref Range    Potassium 3.5 3.4 - 5.1 mmol/L   Basic Metabolic Panel    Collection Time: 03/18/22  5:56 AM   Result Value Ref Range    Fasting Status      Sodium 144 135 - 145 mmol/L    Potassium 3.5 3.4 - 5.1 mmol/L    Chloride 108 (H) 98 - 107 mmol/L    Carbon Dioxide 29 21 - 32 mmol/L    Anion Gap 11 10 - 20 mmol/L    Glucose 95 70 - 99 mg/dL    BUN 24 (H) 6 - 20 mg/dL    Creatinine 1.17 0.67 - 1.17 mg/dL    Glomerular Filtration Rate 64 >=60    BUN/ Creatinine Ratio 21 7 - 25    Calcium 8.2 (L) 8.4 - 10.2 mg/dL   GLUCOSE, BEDSIDE - POINT OF CARE    Collection Time: 03/18/22  7:57 AM   Result Value Ref Range    GLUCOSE, BEDSIDE - POINT OF CARE 92 70 - 99 mg/dL   Potassium    Collection Time: 03/18/22 10:13 AM   Result Value Ref Range    Potassium 4.0 3.4 - 5.1 mmol/L   CBC with Automated Differential (performable only)    Collection Time: 03/18/22 10:13 AM   Result Value Ref Range    WBC 10.2 4.2 - 11.0 K/mcL    RBC 3.95 (L) 4.50 - 5.90 mil/mcL    HGB 9.4 (L) 13.0 - 17.0 g/dL    HCT 30.9 (L) 39.0 - 51.0 %    MCV 78.2 78.0 - 100.0 fl    MCH 23.8 (L) 26.0 - 34.0 pg    MCHC 30.4 (L) 32.0 - 36.5 g/dL    RDW-CV 22.7 (H) 11.0 - 15.0 %    RDW-SD 64.4 (H) 39.0 - 50.0 fL     140 - 450 K/mcL    NRBC 0 <=0 /100 WBC    Neutrophil, Percent 79 %    Lymphocytes, Percent 10 %    Mono, Percent 6 %     Eosinophils, Percent 2 %    Basophils, Percent 1 %    Immature Granulocytes 2 %    Absolute Neutrophils 8.1 (H) 1.8 - 7.7 K/mcL    Absolute Lymphocytes 1.0 1.0 - 4.0 K/mcL    Absolute Monocytes 0.6 0.3 - 0.9 K/mcL    Absolute Eosinophils  0.2 0.0 - 0.5 K/mcL    Absolute Basophils 0.1 0.0 - 0.3 K/mcL    Absolute Immmature Granulocytes 0.2 0.0 - 0.2 K/mcL   GLUCOSE, BEDSIDE - POINT OF CARE    Collection Time: 03/18/22 11:44 AM   Result Value Ref Range    GLUCOSE, BEDSIDE - POINT OF CARE 89 70 - 99 mg/dL           MEDICATIONS     Current Facility-Administered Medications   Medication Dose Route Frequency Provider Last Rate Last Admin   • sodium chloride 0.9 % flush bag 25 mL  25 mL Intravenous PRN Manpreet Mcallister MD       • Potassium Standard Replacement Protocol   Does not apply See Admin Instructions Manpreet Mcallister MD       • Magnesium Standard Replacement Protocol   Does not apply See Admin Instructions Manpreet Mcallister MD       • sodium chloride (PF) 0.9 % injection 10 mL  10 mL Injection PRN Yumiko A Napoleon, DO       • sodium chloride (PF) 0.9 % injection 10 mL  10 mL Injection 2 times per day Yumiko DILLON Napoleon, DO   10 mL at 03/18/22 0827   • sodium chloride (PF) 0.9 % injection 20 mL  20 mL Injection PRN Yumiko A Napoleon, DO       • metoPROLOL tartrate (LOPRESSOR) tablet 37.5 mg  37.5 mg Oral 3 times per day Syed Mike MD   37.5 mg at 03/18/22 0558   • furosemide (LASIX INJECT) injection 20 mg  20 mg Intravenous BID Syed Mike MD   20 mg at 03/18/22 0827   • insulin glargine (LANTUS) injection 8 Units  8 Units Subcutaneous Nightly Leyla Moss MD   8 Units at 03/17/22 2213   • apixaBAN (ELIQUIS) tablet 5 mg  5 mg Oral BID Syed Mike MD   5 mg at 03/18/22 0827   • melatonin tablet 3 mg  3 mg Oral Nightly PRN Leyla Moss MD       • QUEtiapine (SEROquel) tablet 50 mg  50 mg Oral QHS Leyla Moss MD   50 mg at 03/17/22 2212   • dextrose 50 % injection 25 g  25 g Intravenous PRN Leyla Moss MD       • dextrose 50  % injection 12.5 g  12.5 g Intravenous PRN Leyla Moss MD       • glucagon (GLUCAGEN) injection 1 mg  1 mg Intramuscular PRN Leyla Moss MD       • dextrose (GLUTOSE) 40 % gel 15 g  15 g Oral PRN Leyla Moss MD       • dextrose (GLUTOSE) 40 % gel 30 g  30 g Oral PRN Leyla Moss MD       • insulin lispro (ADMELOG,HumaLOG) - Correction Dose   Subcutaneous TID WC Leyla Moss MD   1 Units at 03/16/22 1800   • insulin lispro (ADMELOG,HumaLOG) - Correction Dose   Subcutaneous Nightly Leyla Moss MD       • albuterol (VENTOLIN) nebulizer 2.5 mg  2.5 mg Nebulization Q4H Resp PRN Marisol H Willis, DO       • acetaminophen (TYLENOL) tablet 650 mg  650 mg Oral Q4H PRN Marisol H Willis, DO   650 mg at 03/14/22 2131   • sodium chloride 0.9 % flush bag 25 mL  25 mL Intravenous PRN Marisol H Willis, DO       • sodium chloride (PF) 0.9 % injection 2 mL  2 mL Intracatheter 2 times per day Marisol H Willis, DO   2 mL at 03/18/22 0828   • atorvastatin (LIPITOR) tablet 40 mg  40 mg Oral Daily Marisol H Willis, DO   40 mg at 03/18/22 0827   • clopidogrel (PLAVIX) tablet 75 mg  75 mg Oral Daily Marisol H Willis, DO   75 mg at 03/18/22 0827   • famotidine (PEPCID) tablet 20 mg  20 mg Oral Daily Marisol H Willis, DO   20 mg at 03/18/22 0827   • fenofibrate (TRICOR) tablet 54 mg  54 mg Oral Daily Marisol H Willis, DO   54 mg at 03/18/22 0827   • polyethylene glycol (MIRALAX) packet 17 g  17 g Oral Daily Marisol H Willis, DO       • AMIODarone (PACERONE) tablet 200 mg  200 mg Oral Daily Marisol H Willis, DO   200 mg at 03/18/22 0827   • senna (SENOKOT) 8.6 mg  1 tablet Oral Daily Marisol H Willis, DO   8.6 mg at 03/18/22 0827           IMAGING     TRANSTHORACIC ECHO (TTE) COMPLETE W/ W/O IMAGING AGENT    Result Date: 3/13/2022  *[facility]* [address] [place] [address] [phone] Transthoracic Echocardiogram (TTE) Patient: Sammy Diggs    Study Date/Time:       Mar 13 2022 9:05AM MRN:     [id number]          FIN#:                   [id number] :     1956        Ht/Wt:                 190.5cm 107.5kg Age:     65                BSA/BMI:               2.36m^2 29.6kg/m^2 Gender:  M                 Baseline BP:           88 / 61 Ordering Physician:   Seyd Mike MD  Referring Physician:  Syed Mike MD  Attending Physician:  Joellen Campos Performing Physician: Frank, Cardiology Diagnostic Physician: Syed Mike MD Sonographer:          Ailyn Richards RDCS  -------------------------------------------------------------------------- INDICATIONS:   Congestive heart failure. -------------------------------------------------------------------------- STUDY CONCLUSIONS SUMMARY: 1.  Procedure narrative: Transthoracic echocardiography was performed.     Image quality was adequate. Intravenous contrast (Definity 2ml) was     administered to opacify the left ventricle. 2.  Left ventricle: The cavity size is at the upper limits of normal. Wall     thickness is mildly increased. The ejection fraction was measured by     visual estimation. There is a likely apical LV thrombus best     visualized with definity echo contrast in image 101. The ejection     fraction is 35%. 3.  Aortic valve: Trivial regurgitation. 4.  Mitral valve: Mild to moderate regurgitation. 5.  Left atrium: The atrium is mildly dilated. 6.  Right ventricle: Systolic pressure is mildly to moderately increased.     The estimated peak pressure is 48mm Hg. 7.  Tricuspid valve: Mild-moderate regurgitation, with multiple jets     directed eccentrically. 8.  Pulmonic valve: Mild regurgitation. 9.  Pericardium, extracardiac: A trivial pericardial effusion is     identified. 10. Baseline ECG: Atrial fibrillation. -------------------------------------------------------------------------- STUDY DATA:  [place] There is no prior study available for comparison at this time.  Procedure:  Transthoracic echocardiography was performed. Image quality was adequate. Intravenous  contrast (Definity 2ml) was administered to opacify the left ventricle.  M-mode, complete 2D, complete spectral Doppler, color Doppler, and intravenous contrast injection.  Study status:  Routine.  Study completion:  There were no complications. FINDINGS LEFT VENTRICLE:  The cavity size is at the upper limits of normal. Wall thickness is mildly increased. Systolic function is normal. Global hypokinesis. Unable to accurately assess left ventricular diastolic function parameters due to atrial fibrillation. There is a likely apical LV thrombus best visualized with definity echo contrast in image 101. Cannot exclude an apical thrombus .    The ejection fraction was measured by visual estimation. The ejection fraction is 35%. Left ventricular diastolic function parameters are indeterminate at present time. AORTIC VALVE:  The annulus is normal. The valve is trileaflet. The leaflets are normal thickness and mildly calcified. Cusp separation is normal.  Doppler:  Transvalvular velocity is within the normal range. There is no stenosis.  Trivial regurgitation. AORTA:  Aortic root: The aortic root is normal in size. MITRAL VALVE:  The annulus is normal. The leaflets are mildly thickened and mildly calcified. Leaflet separation is normal.  Doppler: Transvalvular velocity is within the normal range. There is no evidence for stenosis.  Mild to moderate regurgitation.    The valve area by pressure half-time is 5.0cm^2. The valve area index by pressure half-time is 2.12cm^2/m^2.    The peak diastolic gradient is 4mm Hg. LEFT ATRIUM:  The atrium is mildly dilated. RIGHT VENTRICLE:  The cavity size is mildly dilated. Wall thickness is normal. Systolic function is mildly reduced. Systolic pressure is mildly to moderately increased. The estimated peak pressure is 48mm Hg. PULMONIC VALVE:    Structurally normal valve.   Cusp separation is normal.  Doppler:  Transvalvular velocity is within the normal range.  Mild regurgitation.  TRICUSPID VALVE:   Structurally normal valve.   Leaflet separation is normal.  Doppler:  Transvalvular velocity is within the normal range. Mild-moderate regurgitation, with multiple jets directed eccentrically. PULMONARY ARTERY:   The main pulmonary artery is normal-sized. Systolic pressure is mildly increased. RIGHT ATRIUM:  The atrium is normal in size. PERICARDIUM:  A trivial pericardial effusion is identified. SYSTEMIC VEINS: Inferior vena cava: The vessel is normal in size. The respirophasic diameter changes are in the normal range (greater than or equal to 50%). BASELINE ECG:   Atrial fibrillation. -------------------------------------------------------------------------- Measurements  Left ventricle             Value             Ref        Left atrium continued        Value          Ref  LETICIA, LAX chord             5.3   cm          4.2 - 5.8  Vol/bsa, ES, 1-p A4C (H)     38    ml/m^2   12 - 37  ESD, LAX chord             3.9   cm          2.5 - 4.0  Vol, ES, 1-p A2C     (H)     93    ml       18 - 58  LETICIA/bsa, LAX chord         2.3   cm/m^2      2.2 - 3.0  Vol/bsa, ES, 1-p A2C         39    ml/m^2   11 - 43  ESD/bsa, LAX chord         1.7   cm/m^2      1.3 - 2.1  Vol, ES, 2-p                 92    ml       ---------  EDV                (H)     151   ml          62 - 150   Vol/bsa, ES, 2-p     (H)     39    ml/m^2   16 - 34  ESV                (H)     78    ml          21 - 61  EF                 (L)     35    %           52 - 72    Mitral valve                 Value          Ref  SV                         55    ml          ---------  Peak E                       1.01  m/sec    ---------  EDV/bsa                    64    ml/m^2      34 - 74    Decel time                   151   ms       ---------  ESV/bsa            (H)     33    ml/m^2      11 - 31    PHT                          44    ms       ---------  SV/bsa                     23    ml/m^2      ---------  Peak grad, D                 4     mm Hg     ---------                                                          MVA, PHT                     5.0   cm^2     ---------  LVOT                       Value             Ref        MVA/bsa, PHT                 2.12  cm^2/m^2 ---------  Diam, S                    2.3   cm          ---------  Area                       4.2   cm^2        ---------  Pulmonic valve               Value          Ref  Peak clifford, S                0.7   m/sec       ---------  MN v, ED                     1.86  m/sec    ---------  Peak grad, S               2     mm Hg       ---------  MN grad, ED                  14    mm Hg    ---------  Qs                         6.4   L/min       ---------  Qs/bsa                     2.7   L/(min-m^2) ---------  Tricuspid valve              Value          Ref                                                          TR peak v            (H)     3.2   m/sec    <=2.8  Ventricular septum         Value             Ref        Peak RV-RA grad, S           40    mm Hg    ---------  IVS, ED            (H)     1.4   cm          0.6 - 1.0  Max TR clifford                   3.16  m/sec    ---------   Right ventricle            Value             Ref        Ascending aorta              Value          Ref  Pressure, S                48    mm Hg       ---------  AAo AP diam, ED              3.7   cm       2.2 - 3.8                                                          AAo AP diam/bsa, ED          1.6   cm/m^2   1.1 - 1.9  Left atrium                Value             Ref  AP dim, ES         (H)     4.7   cm          3.0 - 4.0  Pulmonary artery             Value          Ref  AP dim index               2.0   cm/m^2      1.5 - 2.3  Pressure, S                  48    mm Hg    ---------  Area ES, A4C       (H)     29    cm^2        <=20       Pressure ED                  22    mm Hg    ---------  Area ES, A2C               29    cm^2        ---------  Vol, S             (H)     94    ml          18 - 58    Systemic veins                Value          Ref  Vol/bsa, S         (H)     40    ml/m^2      16 - 34    Estimated CVP                8     mm Hg    ---------  Vol, ES, 1-p A4C   (H)     89    ml          18 - 58 Legend: (L)  and  (H)  raad values outside specified reference range. Prepared and electronically signed by Syed Mike MD 03/13/2022 14:56

## 2025-03-18 DIAGNOSIS — F33.9 MAJOR DEPRESSION, RECURRENT, CHRONIC: ICD-10-CM

## 2025-03-18 RX ORDER — BUPROPION HYDROCHLORIDE 300 MG/1
300 TABLET ORAL DAILY
Qty: 30 TABLET | Refills: 0 | Status: SHIPPED | OUTPATIENT
Start: 2025-03-18

## 2025-03-18 NOTE — TELEPHONE ENCOUNTER
Rx Refill Note  Requested Prescriptions     Pending Prescriptions Disp Refills    buPROPion XL (WELLBUTRIN XL) 300 MG 24 hr tablet [Pharmacy Med Name: bupropion HCl  mg 24 hr tablet, extended release] 30 tablet 0     Sig: TAKE ONE TABLET BY MOUTH ONCE DAILY      Last office visit with prescribing clinician: 2/14/2025   Last telemedicine visit with prescribing clinician: Visit date not found   Next office visit with prescribing clinician: 3/21/2025                         Would you like a call back once the refill request has been completed: [] Yes [] No    If the office needs to give you a call back, can they leave a voicemail: [] Yes [] No    Astrid Roblero MA  03/18/25, 15:41 EDT

## 2025-03-20 ENCOUNTER — TELEPHONE (OUTPATIENT)
Age: 80
End: 2025-03-20

## 2025-03-20 NOTE — TELEPHONE ENCOUNTER
Caller: Millie Richardson    Relationship to patient: Self    Best call back number: [phone]     Chief complaint: SHORTNESS OF BREATH, AT REST  LIGHTHEADEDNESS    Patient directed to call 911 or go to their nearest emergency room.     Patient verbalized understanding: [x] Yes  [] No  If no, why?    Additional notes:PATIENT WILL GO TO [facility] ED. HER AIDE WILL BE THERE AT 12:30 PM TODAY TO TAKE HER.

## 2025-03-24 ENCOUNTER — TELEPHONE (OUTPATIENT)
Age: 80
End: 2025-03-24

## 2025-03-24 NOTE — TELEPHONE ENCOUNTER
Called and verified with Sara that per Dr Luis, patient should be taking 200mg once daily by mouth. Crystal will cancel the 100mg BID.

## 2025-03-24 NOTE — TELEPHONE ENCOUNTER
Pharmacy representative name: [place]     Pharmacy representative phone number:[phone]    What medication are you calling in regards to:     metoprolol succinate XL (TOPROL-XL) 100 MG 24 hr tablet       What question does the pharmacy have: NEEDS TO VERIFY THIS PATIENT IS SUPPOSED TO TAKE 200 MG A DAY. THEY SAID THAT IS A VERY HIGH DOSAGE.    Who is the provider that prescribed the medication: DO COLLIER    Additional notes:  PLEASE CALL THEM BACK ASAP.    THANK YOU

## 2025-04-02 ENCOUNTER — READMISSION MANAGEMENT (OUTPATIENT)
Dept: CALL CENTER | Facility: HOSPITAL | Age: 80
End: 2025-04-02
Payer: MEDICARE

## 2025-04-03 ENCOUNTER — TRANSITIONAL CARE MANAGEMENT TELEPHONE ENCOUNTER (OUTPATIENT)
Dept: CALL CENTER | Facility: HOSPITAL | Age: 80
End: 2025-04-03
Payer: MEDICARE

## 2025-04-03 NOTE — OUTREACH NOTE
Call Center TCM Note      Flowsheet Row Responses   [facility] patient discharged from? Non-  [[facility]]   Does the patient have one of the following disease processes/diagnoses(primary or secondary)? Other   TCM attempt successful? Yes   Call start time 1501   Call end time 1504   Person spoke with today (if not patient) and relationship Patient   Meds reviewed with patient/caregiver? Yes   Is the patient having any side effects they believe may be caused by any medication additions or changes? No   Does the patient have all medications ordered at discharge? Yes   Is the patient taking all medications as directed (includes completed medication regime)? Yes   Comments Could not find availability with Dr Luis in TCM timeframe. Message sent to office to reach out to patient to assist. Patient was in hospital at [facility].   Does the patient have an appointment with their PCP within 7-14 days of discharge? No   Nursing Interventions Routed TCM call to PCP office, PCP office requested to make appointment - message sent   Psychosocial issues? No   Did the patient receive a copy of their discharge instructions? Yes   Nursing interventions Reviewed instructions with patient   What is the patient's perception of their health status since discharge? Improving   Is the patient/caregiver able to teach back signs and symptoms related to disease process for when to call PCP? Yes   Is the patient/caregiver able to teach back signs and symptoms related to disease process for when to call 911? Yes   Is the patient/caregiver able to teach back the hierarchy of who to call/visit for symptoms/problems? PCP, Specialist, Home health nurse, Urgent Care, ED, 911 Yes   If the patient is a current smoker, are they able to teach back resources for cessation? Not a smoker   TCM call completed? Yes   Call end time 1504   Would this patient benefit from a Referral to [facility] Social Work? No   Is the patient interested in additional calls from  an ambulatory ? No            Bob VENEGAS - Registered Nurse    4/3/2025, 15:05 EDT

## 2025-04-03 NOTE — OUTREACH NOTE
Prep Survey      Flowsheet Row Responses   [facility] patient discharged from? Non-[facility]   Is LACE score < 7 ? Non-BH Discharge   Eligibility [facility]   Date of Admission 03/24/25   Date of Discharge 04/02/25   Discharge Disposition Home or Self Care   Discharge diagnosis a/c CHF   Does the patient have one of the following disease processes/diagnoses(primary or secondary)? CHF   Does the patient have Home health ordered? No   Prep survey completed? Yes            WES WALLER - Registered Nurse

## 2025-04-15 ENCOUNTER — TELEPHONE (OUTPATIENT)
Age: 80
End: 2025-04-15

## 2025-04-15 NOTE — TELEPHONE ENCOUNTER
Caller: [facility]    Relationship:     Best call back number: [phone]    What orders are you requesting (i.e. lab or imaging): VERBAL ORDERS FOR HOME HEALTH, OT AND PT.    In what timeframe would the patient need to come in: ASAP    Where will you receive your lab/imaging services: HOME    Additional notes: [facility] NEEDS VERBAL ORDERS TO PROCEED WITH CARE FOR ALL 3. OKAY TO LEAVE SECURE .

## 2025-04-16 DIAGNOSIS — I48.21 PERMANENT ATRIAL FIBRILLATION: Primary | ICD-10-CM

## 2025-04-16 DIAGNOSIS — I10 ESSENTIAL HYPERTENSION: ICD-10-CM

## 2025-04-16 RX ORDER — METOPROLOL SUCCINATE 50 MG/1
50 TABLET, EXTENDED RELEASE ORAL 2 TIMES DAILY
Qty: 60 TABLET | Refills: 0 | Status: SHIPPED | OUTPATIENT
Start: 2025-04-16

## 2025-04-16 NOTE — TELEPHONE ENCOUNTER
"Called and gave verbal orders for home health, also discussed patient going to the hospital yesterday. Patient called stating she was \"smothering\", and she did just have a stent placed a week ago. I advised that we do not mind to see her in the office but it may have been better to let them check her at the hospital. Per home health she was taken by ambulance possibly to the [facility].  "

## 2025-04-28 ENCOUNTER — OUTSIDE FACILITY SERVICE (OUTPATIENT)
Age: 80
End: 2025-04-28
Payer: MEDICARE

## 2025-04-28 ENCOUNTER — TELEPHONE (OUTPATIENT)
Age: 80
End: 2025-04-28
Payer: MEDICARE

## 2025-04-29 ENCOUNTER — OFFICE VISIT (OUTPATIENT)
Age: 80
End: 2025-04-29
Payer: MEDICARE

## 2025-04-29 VITALS
DIASTOLIC BLOOD PRESSURE: 72 MMHG | HEART RATE: 76 BPM | WEIGHT: 198 LBS | BODY MASS INDEX: 30.01 KG/M2 | SYSTOLIC BLOOD PRESSURE: 110 MMHG | HEIGHT: 68 IN | OXYGEN SATURATION: 98 %

## 2025-04-29 DIAGNOSIS — F51.04 PSYCHOPHYSIOLOGICAL INSOMNIA: ICD-10-CM

## 2025-04-29 DIAGNOSIS — I50.42 CHRONIC COMBINED SYSTOLIC AND DIASTOLIC CHF (CONGESTIVE HEART FAILURE): ICD-10-CM

## 2025-04-29 DIAGNOSIS — J96.11 CHRONIC RESPIRATORY FAILURE WITH HYPOXIA: Primary | ICD-10-CM

## 2025-04-29 DIAGNOSIS — Z78.9 IMPAIRED MOBILITY AND ADLS: ICD-10-CM

## 2025-04-29 DIAGNOSIS — Z79.01 CHRONIC ANTICOAGULATION: ICD-10-CM

## 2025-04-29 DIAGNOSIS — I48.19 ATRIAL FIBRILLATION, PERSISTENT: ICD-10-CM

## 2025-04-29 DIAGNOSIS — Z74.09 IMPAIRED MOBILITY AND ADLS: ICD-10-CM

## 2025-04-29 DIAGNOSIS — E03.9 ACQUIRED HYPOTHYROIDISM: ICD-10-CM

## 2025-04-29 DIAGNOSIS — I50.32 CHRONIC DIASTOLIC (CONGESTIVE) HEART FAILURE: ICD-10-CM

## 2025-04-29 DIAGNOSIS — I10 ESSENTIAL HYPERTENSION: ICD-10-CM

## 2025-04-29 DIAGNOSIS — F33.9 MAJOR DEPRESSION, RECURRENT, CHRONIC: ICD-10-CM

## 2025-04-29 DIAGNOSIS — I87.2 CHRONIC VENOUS INSUFFICIENCY OF LOWER EXTREMITY: ICD-10-CM

## 2025-04-29 DIAGNOSIS — F41.1 GENERALIZED ANXIETY DISORDER: ICD-10-CM

## 2025-04-29 DIAGNOSIS — M17.0 TRICOMPARTMENT OSTEOARTHRITIS OF BOTH KNEES: ICD-10-CM

## 2025-04-29 PROCEDURE — 1125F AMNT PAIN NOTED PAIN PRSNT: CPT | Performed by: FAMILY MEDICINE

## 2025-04-29 PROCEDURE — 3078F DIAST BP <80 MM HG: CPT | Performed by: FAMILY MEDICINE

## 2025-04-29 PROCEDURE — 99215 OFFICE O/P EST HI 40 MIN: CPT | Performed by: FAMILY MEDICINE

## 2025-04-29 PROCEDURE — 3074F SYST BP LT 130 MM HG: CPT | Performed by: FAMILY MEDICINE

## 2025-04-29 PROCEDURE — G2211 COMPLEX E/M VISIT ADD ON: HCPCS | Performed by: FAMILY MEDICINE

## 2025-04-29 RX ORDER — LISINOPRIL 40 MG/1
40 TABLET ORAL DAILY
Qty: 90 TABLET | Refills: 3 | Status: SHIPPED | OUTPATIENT
Start: 2025-04-29

## 2025-04-29 RX ORDER — CLOPIDOGREL BISULFATE 75 MG/1
75 TABLET ORAL DAILY
Qty: 90 TABLET | Refills: 3 | Status: SHIPPED | OUTPATIENT
Start: 2025-04-29

## 2025-04-29 RX ORDER — FUROSEMIDE 20 MG/1
20 TABLET ORAL DAILY PRN
Qty: 90 TABLET | Refills: 3 | Status: SHIPPED | OUTPATIENT
Start: 2025-04-29

## 2025-04-29 RX ORDER — METOPROLOL SUCCINATE 200 MG/1
200 TABLET, EXTENDED RELEASE ORAL DAILY
COMMUNITY

## 2025-04-29 RX ORDER — VENLAFAXINE 100 MG/1
100 TABLET ORAL 2 TIMES DAILY
Qty: 180 TABLET | Refills: 3 | Status: SHIPPED | OUTPATIENT
Start: 2025-04-29

## 2025-04-29 RX ORDER — BUPROPION HYDROCHLORIDE 300 MG/1
300 TABLET ORAL DAILY
Qty: 90 TABLET | Refills: 3 | Status: SHIPPED | OUTPATIENT
Start: 2025-04-29

## 2025-04-29 RX ORDER — SPIRONOLACTONE 50 MG/1
50 TABLET, FILM COATED ORAL DAILY
Qty: 90 TABLET | Refills: 3 | Status: SHIPPED | OUTPATIENT
Start: 2025-04-29

## 2025-04-29 RX ORDER — ASPIRIN 81 MG/1
81 TABLET ORAL DAILY
Qty: 90 TABLET | Refills: 3 | Status: SHIPPED | OUTPATIENT
Start: 2025-04-29

## 2025-04-29 RX ORDER — THYROID 90 MG/1
90 TABLET ORAL DAILY
Qty: 90 TABLET | Refills: 3 | Status: SHIPPED | OUTPATIENT
Start: 2025-04-29

## 2025-04-29 RX ORDER — APIXABAN 5 MG/1
5 TABLET, FILM COATED ORAL 2 TIMES DAILY
Qty: 180 TABLET | Refills: 3 | Status: SHIPPED | OUTPATIENT
Start: 2025-04-29

## 2025-04-29 NOTE — PROGRESS NOTES
Established Patient        Chief Complaint:   Chief Complaint   Patient presents with    Hospital Follow Up Visit     Shortness of breathing        Millie Richardson is a 79 y.o. female    History of Present Illness:     Walking oximetry during hospitalization demonstrated desaturation to 81%.    I have reviewed labs/imaging/records from this hospitalization, including ER staff and admitting/attending physicians H/P's and progress notes to establish a comprehensive understanding of this patient's clinical hospital course, as well as to establish a transition of care appropriately.    Date of Admission: 4/15/2025   Date of Discharge: 4/16/2025      Primary Care Physician: Rajan Luis DO  Consultations: None    Discharge Diagnoses:    Acute on chronic hypoxemic respiratory failure  Heart failure with reduced EF  CAD status post PCI  - Patient presented with stable resting room air sat however quickly desatted with exertion. Placed on supplemental oxygen. Lab workup was largely unrevealing as was chest x-ray. Patient was admitted with CHF exacerbation. Appears she was discharged home with supplemental oxygen at there was an issue obtaining such at discharge. Patient states she had a portable tank at discharge but no concentrator. I did review the discharge as well as hospital notes showing exertional hypoxemia. However currently is not showing similar O2 demand. Patient was resumed on Entresto Jardiance beta-blocker and diuretic therapy. Today resting and exertional sats remained stable. Performed 6-minute walk with sats 95% or greater throughout. Patient appears euvolemic on exam. At this point with medical management of her heart failure she appears optimized for discharge home. Will reactivate home services. Follow-up with PCP in 1 week for clinical recheck.    Paroxysmal atrial fibrillation/bradycardia  - Patient hemodynamically stable but heart rate in the 50s. Patient on Toprol- mg  daily. Reduced dose and placed on telemetry for monitoring. Heart rate has remained generally stable. Typically runs in the 50s and 60s. Blood pressure within goal range. Will reduce dose at discharge. Continue outpatient monitoring.     Mood disorder  - Resume home medical management    Hypothyroidism  - Resume [place] Thyroid    Reason for Admission:  Shortness of breath at home    Hospital Course:  Millie Richardson is a 79 y.o. female presenting with acute on chronic dyspnea with exertion. Symptoms ongoing for some time now. Recent admission at the beginning of the month to [facility] with similar symptomology. At that time patient was noted to be hypoxic with exertion. Patient was requiring 4 L of oxygen upon admission. She was diagnosed with CHF. Had EF of 30%. Was optimized medically. It appears she was discharged home on 2 L nasal cannula. Patient states she received a portable tank at discharge. When she contacted oxygen company apparently it was too late to get approval for concentrator. She has thusly been without oxygen since discharge. Patient presented to the ED today with worsening dyspnea. Denied any fever or chills. No productive cough. No chest pain. On arrival patient's resting sats were 95% but she dropped to 81% with exertion. Chest x-ray was fairly unrevealing as were labs. Patient did have an elevated proBNP but negative troponin. She is not having active chest pain. Patient was subsequent mated to hospitalist service for further evaluation and management.    Patient was given single dose IV Bumex and resumed on her typical heart failure management. Toprol-XL was reduced due to bradycardia noted during ED stay. Patient apparently had some bradycardia in the 30s and 40s. With reduction from 200 to 50 mg her heart rates remained stable in the 50s and 60s typically. She had appropriate reactivity with activity. Patient has diuresed with Bumex 1 mg IV. Today lung fields are clear. Performed  "6-minute walk on room air without evidence of desaturation. Patient has remained clinically stable and felt optimized for discharge home.    I have explained the patient that at this point she does not meet medical necessity for home oxygen. Potentially with induction of appropriate cardiac medications her respiratory failure as well as heart failure improved. Patient will resume home medications however have adjusted her Toprol-XL to 50 mg daily. Patient will have home services reactivated. Will follow-up with PCP in approximately 1 week for clinical recheck. She has been advised of signs and or symptoms that warrant immediate medical attention return to the hospital.    Since discharge from hospital, tolerating all nutritional intake.  She reports good hydration habits.  Continues to diurese well.  Denies orthopnea.  No new falls or injuries reported.        Subjective     The following portions of the patient's history were reviewed and updated as appropriate: allergies, current medications, past family history, past medical history, past social history, past surgical history and problem list.    Allergies   Allergen Reactions    Nitrous Oxide Swelling    Penicillins Anaphylaxis and Swelling    Procaine Nausea Only    Sulfa Antibiotics Other (See Comments)     Patient states \"it made me want to go to bed, I didn't have a fever but I felt like I did\"        Review of Systems  Constitutional: Negative for fever. Negative for chills, diaphoresis; malaise/fatigue of chronic nature, no appreciable weight change of unintended nature.  HENT: No dysphagia; no changes to vision/hearing/smell/taste; no epistaxis  Eyes: Negative for redness and visual disturbance.   Respiratory: No hemoptysis; cough with clear/yellow phlegm at times.  Cardiovascular: Negative for chest pain; periodic symptomatic palpitations.  Gastrointestinal: Negative for abdominal distention, abdominal pain and blood in stool.   Endocrine: Negative for " "cold intolerance and heat intolerance.   Genitourinary: Negative for difficulty urinating, dysuria and frequency.   Musculoskeletal: Chronic arthralgias, back pain and myalgias.  Patient does report easy fatigability, at times.  Skin: No new rashes or lesions.  Neurological: No syncopal episodes.  Generalized weakness of chronic nature.  Hematological: Negative for adenopathy. Does not bruise/bleed easily.   Psychiatric/Behavioral: Negative for confusion. The patient is not nervous/anxious.    Objective     Physical Exam   Vital Signs: /72   Pulse 76   Ht 172.7 cm (68\")   Wt 89.8 kg (198 lb)   LMP  (LMP Unknown)   SpO2 98%   BMI 30.11 kg/m²   BMI is >= 25 and <30. (Overweight) The following options were offered after discussion;: exercise counseling/recommendations and nutrition counseling/recommendations    General Appearance: alert, oriented x 3, no acute distress.  Pleasant and interactive during questioning/examination.  Skin: warm and dry.  Without jaundice.  HEENT: Atraumatic.  pupils round and reactive to light and accommodation, oral mucosa pink and moist.  Nares patent without epistaxis.  External auditory canals are patent tympanic membranes intact.  Neck: supple, no JVD, trachea midline.  No thyromegaly  Lungs: Audible air exchange noted all lung fields, unlabored breathing effort.  Prolonged expiratory phase bilaterally, rhonchus and referred upper airway congestion to bilateral lungs, cleared with light cough.  Heart: RRR, normal S1 and S2, no S3, no rub.  Chronic heart murmurs.  Abdomen: soft, non-tender, no palpable bladder, present bowel sounds to auscultation ×4.  No guarding or rigidity.  Extremities: no clubbing, cyanosis.  Good range of motion actively and passively.  Symmetric muscle strength and development.  Ambulates with rollator.  Chronic gravity dependent nonpitting edema of the lower extremities.  Neuro: normal speech and mental status.  Cranial nerves II through XII intact.  " No anosmia. DTR 2+; proprioception intact.  No focal motor/sensory deficits.        Assessment and Plan      Assessment/Plan:   Diagnoses and all orders for this visit:    1. Chronic respiratory failure with hypoxia (Primary)  -     Oxygen Therapy    2. Chronic combined systolic and diastolic CHF (congestive heart failure)  -     Oxygen Therapy    3. Acquired hypothyroidism    4. Atrial fibrillation, persistent    5. Chronic anticoagulation    6. Tricompartment osteoarthritis of both knees    7. Impaired mobility and ADLs      Utilizing rollator at present.  Fall precautions in place.    Adding continuous oxygen therapy.  Pt prefers [facility] for this service.    Appears to be at dry weight presently.  Will need f/u surveillance BMP in 10 days or so; will ask home health, [facility], to get this testing done at home visit.    Vital signs demonstrate hemodynamic stability, blood pressure is at goal.  Heart rate well-controlled.  Continue anticoagulation.    Continue thyroid supplementation.      Discussion Summary:    Discussed plan of care in detail with pt today; pt verb understanding and agrees.    I spent 45 minutes caring for Millie on this date of service. This time includes time spent by me in the following activities:preparing for the visit, reviewing tests, obtaining and/or reviewing a separately obtained history, performing a medically appropriate examination and/or evaluation , counseling and educating the patient/family/caregiver, ordering medications, tests, or procedures, referring and communicating with other health care professionals , documenting information in the medical record, independently interpreting results and communicating that information with the patient/family/caregiver, and care coordination    I confirm accuracy of unchanged data/findings which have been carried forward from previous visit, as well as I have updated appropriately those that have changed.    Follow up:  No follow-ups on file.      There are no Patient Instructions on file for this visit.    Rajan Luis,   04/29/25  12:00 EDT

## 2025-04-29 NOTE — TELEPHONE ENCOUNTER
Rx Refill Note  Requested Prescriptions     Pending Prescriptions Disp Refills    amitriptyline (ELAVIL) 25 MG tablet 180 tablet 3     Sig: Take 1-2 tablets by mouth Every Night.    buPROPion XL (WELLBUTRIN XL) 300 MG 24 hr tablet 90 tablet 3     Sig: Take 1 tablet by mouth Daily.    Eliquis 5 MG tablet tablet 180 tablet 3     Sig: Take 1 tablet by mouth 2 (Two) Times a Day.    furosemide (LASIX) 20 MG tablet 90 tablet 3     Sig: Take 1 tablet by mouth Daily As Needed (Edema).    lisinopril (PRINIVIL,ZESTRIL) 40 MG tablet 90 tablet 3     Sig: Take 1 tablet by mouth Daily.    Thyroid (NP THYROID) 90 MG PO tablet 90 tablet 3     Sig: Take 1 tablet by mouth Daily.    venlafaxine (EFFEXOR) 100 MG tablet 180 tablet 3     Sig: Take 1 tablet by mouth 2 (Two) Times a Day.    clopidogrel (Plavix) 75 MG tablet 90 tablet 3     Sig: Take 1 tablet by mouth Daily.    empagliflozin (JARDIANCE) 10 MG tablet tablet 90 tablet 3     Sig: Take 1 tablet by mouth Daily.    sacubitril-valsartan (ENTRESTO) 24-26 MG tablet 180 tablet 3     Sig: Take 1 tablet by mouth 2 (Two) Times a Day.    spironolactone (Aldactone) 50 MG tablet 90 tablet 3     Sig: Take 1 tablet by mouth Daily.    aspirin 81 MG EC tablet 90 tablet 3     Sig: Take 1 tablet by mouth Daily.      Last office visit with prescribing clinician: 4/29/2025   Last telemedicine visit with prescribing clinician: Visit date not found   Next office visit with prescribing clinician: 5/23/2025     Lynn Caballero MA  04/29/25, 15:04 EDT

## 2025-05-02 ENCOUNTER — TELEPHONE (OUTPATIENT)
Age: 80
End: 2025-05-02

## 2025-05-02 NOTE — TELEPHONE ENCOUNTER
Called and spoke to patient about her oxygen referral. I gave her [employer]'s phone number and let her know that Leonard, our referral coordinator sent it yesterday. She will call them for any future questions/concerns.

## 2025-05-02 NOTE — TELEPHONE ENCOUNTER
"Attempted to call to give verbal orders, it sounded like someone picked up but there was no sound.    Relay     \"Please give verbal orders per Dr Janelle Gonzalez MA.\"        "

## 2025-05-02 NOTE — TELEPHONE ENCOUNTER
Caller: Millie Richardson    Relationship: Self    Best call back number: [phone]     What is the best time to reach you: ANYTIME    What was the call regarding: PLEASE CALL BACK TO DISCUSS THE OXYGEN ORDER. SHE HAS NOT HEARD FROM ANYONE REGARDING HER OXYGEN. SHE CALLED [facility], THEY STATE THEY DO NOT HAVE AN ORDER FOR HER.

## 2025-05-02 NOTE — TELEPHONE ENCOUNTER
[facility]   [phone]  REQUESTING VERBAL ORDERS TO CONTINUE VISITS; 1X PER WEEK FOR 5 WEEKS

## 2025-05-13 ENCOUNTER — TELEPHONE (OUTPATIENT)
Age: 80
End: 2025-05-13
Payer: MEDICARE

## 2025-05-13 RX ORDER — ALBUTEROL SULFATE 90 UG/1
2 INHALANT RESPIRATORY (INHALATION) EVERY 4 HOURS PRN
Qty: 18 G | Refills: 5 | Status: SHIPPED | OUTPATIENT
Start: 2025-05-13

## 2025-05-13 NOTE — TELEPHONE ENCOUNTER
Caller: LIZZIE WITH [facility]    Relationship: Home Health    Best call back number: [phone]     What medication are you requesting: BRONCHIAL DILATOR OR INHALER     What are your current symptoms: DECREASED AIRFLOW THROUGHOUT HER LUNGS      If a prescription is needed, what is your preferred pharmacy and phone number: [facility] - [place] - [address] - [phone]  - [phone]     Additional notes:  LIZZIE IS CALLING TO REPORT THE PATIENT'S OXYGEN LEVEL %, BUT SHE IS HAVING DECREASED AIRFLOW THROUGH OUT HER LUNGS. PATIENT DOES NOT SEEM TO BE IN ANY DISTRESS. LIZZIE JUST WANTS PROVIDER TO KNOW IN CASE THEY WANT TO CALL IN SOMETHING. PATIENT IS SHORT OF BREATH AT REST.     WARM TRANSFER TO THE OFFICE

## 2025-05-13 NOTE — TELEPHONE ENCOUNTER
JAMAR WITH [facility] CALLED REGARDING PT JUST AS AN FYI.  HE SAID SHE HAS COMPLAINED WITH SHORTNESS OF BREATH AND WHEN SHE STANDS UP SHE GETS DIZZY.  TODAY STATS 130/70 SITTING TODAY - STANDING 100/60    LAST WEEK STATS 140/80 SITTING - STANDING 130/70

## 2025-05-20 DIAGNOSIS — K21.9 GASTROESOPHAGEAL REFLUX DISEASE WITHOUT ESOPHAGITIS: Chronic | ICD-10-CM

## 2025-05-22 RX ORDER — PANTOPRAZOLE SODIUM 40 MG/1
40 TABLET, DELAYED RELEASE ORAL DAILY
Qty: 90 TABLET | Refills: 0 | Status: SHIPPED | OUTPATIENT
Start: 2025-05-22

## 2025-05-22 NOTE — TELEPHONE ENCOUNTER
Rx Refill Note  Requested Prescriptions     Pending Prescriptions Disp Refills    pantoprazole (PROTONIX) 40 MG EC tablet [Pharmacy Med Name: pantoprazole 40 mg tablet,delayed release] 90 tablet 0     Sig: TAKE ONE TABLET BY MOUTH ONCE DAILY 30 TO 60 MINUTES BEFORE A MEAL      Last office visit with prescribing clinician: 4/29/2025   Last telemedicine visit with prescribing clinician: Visit date not found   Next office visit with prescribing clinician: 5/23/2025                         Would you like a call back once the refill request has been completed: [] Yes [] No    If the office needs to give you a call back, can they leave a voicemail: [] Yes [] No    Astrid Roblero MA  05/22/25, 07:55 EDT

## 2025-05-23 ENCOUNTER — OFFICE VISIT (OUTPATIENT)
Age: 80
End: 2025-05-23
Payer: MEDICARE

## 2025-05-23 VITALS
HEIGHT: 68 IN | SYSTOLIC BLOOD PRESSURE: 100 MMHG | BODY MASS INDEX: 30.01 KG/M2 | OXYGEN SATURATION: 95 % | HEART RATE: 80 BPM | DIASTOLIC BLOOD PRESSURE: 40 MMHG | WEIGHT: 198 LBS

## 2025-05-23 DIAGNOSIS — I48.21 PERMANENT ATRIAL FIBRILLATION: ICD-10-CM

## 2025-05-23 DIAGNOSIS — I50.32 CHRONIC DIASTOLIC (CONGESTIVE) HEART FAILURE: ICD-10-CM

## 2025-05-23 DIAGNOSIS — I87.2 CHRONIC VENOUS INSUFFICIENCY OF LOWER EXTREMITY: ICD-10-CM

## 2025-05-23 DIAGNOSIS — Z00.00 MEDICARE ANNUAL WELLNESS VISIT, SUBSEQUENT: Primary | ICD-10-CM

## 2025-05-23 DIAGNOSIS — Z78.9 IMPAIRED MOBILITY AND ADLS: ICD-10-CM

## 2025-05-23 DIAGNOSIS — E03.9 ACQUIRED HYPOTHYROIDISM: ICD-10-CM

## 2025-05-23 DIAGNOSIS — Z91.81 AT HIGH RISK FOR FALLS: ICD-10-CM

## 2025-05-23 DIAGNOSIS — Z74.09 IMPAIRED MOBILITY AND ADLS: ICD-10-CM

## 2025-05-23 DIAGNOSIS — I10 ESSENTIAL HYPERTENSION: ICD-10-CM

## 2025-05-23 NOTE — ASSESSMENT & PLAN NOTE
Congestive heart failure due to coronary artery disease (CAD) and hypertension.  Heart failure is stable.  NYHA Class I.  Continue current treatment regimen.  Heart failure will be reassessed in 6 months.           Frequent weight evaluations to continue, avoid excessive sodium/salt dietary intake.  Demonstrates no findings of acute volume overload at this time.

## 2025-05-23 NOTE — PROGRESS NOTES
Subjective   The ABCs of the Annual Wellness Visit  Medicare Wellness Visit      Millie Richardson is a 79 y.o. patient who presents for a Medicare Wellness Visit.    The following portions of the patient's history were reviewed and   updated as appropriate: allergies, current medications, past family history, past medical history, past social history, past surgical history, and problem list.    Compared to one year ago, the patient's physical   health is the same.  Compared to one year ago, the patient's mental   health is the same.    Recent Hospitalizations:  She was not admitted to the hospital during the last year.     Current Medical Providers:  Patient Care Team:  Rajan Luis DO as PCP - General (Family Medicine)  Martínez Cintron MD as Consulting Physician (Cardiology)  Sheeba Shepherd MD as Consulting Physician (Pulmonary Disease)  Rebekah Crowe MD as Consulting Physician (Obstetrics and Gynecology)  Dana Espinoza APRN as Nurse Practitioner (Family Medicine)    Outpatient Medications Prior to Visit   Medication Sig Dispense Refill    albuterol sulfate HFA (Proventil HFA) 108 (90 Base) MCG/ACT inhaler Inhale 2 puffs Every 4 (Four) Hours As Needed for Wheezing. 18 g 5    amitriptyline (ELAVIL) 25 MG tablet Take 1-2 tablets by mouth Every Night. 180 tablet 3    aspirin 81 MG EC tablet Take 1 tablet by mouth Daily. 90 tablet 3    buPROPion XL (WELLBUTRIN XL) 300 MG 24 hr tablet Take 1 tablet by mouth Daily. 90 tablet 3    clopidogrel (Plavix) 75 MG tablet Take 1 tablet by mouth Daily. 90 tablet 3    Eliquis 5 MG tablet tablet Take 1 tablet by mouth 2 (Two) Times a Day. 180 tablet 3    empagliflozin (JARDIANCE) 10 MG tablet tablet Take 1 tablet by mouth Daily. 90 tablet 3    furosemide (LASIX) 20 MG tablet Take 1 tablet by mouth Daily As Needed (Edema). 90 tablet 3    lisinopril (PRINIVIL,ZESTRIL) 40 MG tablet Take 1 tablet by mouth Daily. 90 tablet 3    metoprolol succinate XL (Toprol XL) 50 MG 24 hr  tablet Take 1 tablet by mouth 2 (Two) Times a Day. 60 tablet 0    Misc. Devices (Bath/Shower Seat) misc 1 Units As Needed (bathing). 1 each 0    Misc. Devices (Rollator Ultra-Light) misc Use 1 Units Daily. 1 each 0    Multi-Vitamin tablet tablet Take 1 tablet by mouth Daily.      multivitamin with minerals (MULTIVITAMIN ADULT PO) Take 1 tablet by mouth Daily.      nystatin (MYCOSTATIN) 520983 UNIT/GM powder Apply  topically to the appropriate area as directed 4 (Four) Times a Day. 120 g 1    pantoprazole (PROTONIX) 40 MG EC tablet TAKE ONE TABLET BY MOUTH ONCE DAILY 30 TO 60 MINUTES BEFORE A MEAL 90 tablet 0    polyethylene glycol (MiraLax) 17 GM/SCOOP powder Take 17 g by mouth 3 (Three) Times a Week. Mix each dose with 8 ounces liquid 510 g 3    potassium chloride (KLOR-CON M10) 10 MEQ CR tablet Take 1 tablet by mouth Daily.      potassium chloride 10 MEQ CR tablet TAKE ONE TABLET BY MOUTH ONCE DAILY 30 tablet 0    sacubitril-valsartan (ENTRESTO) 24-26 MG tablet Take 1 tablet by mouth 2 (Two) Times a Day. 180 tablet 3    spironolactone (Aldactone) 50 MG tablet Take 1 tablet by mouth Daily. 90 tablet 3    Thyroid (NP THYROID) 90 MG PO tablet Take 1 tablet by mouth Daily. 90 tablet 3    venlafaxine (EFFEXOR) 100 MG tablet Take 1 tablet by mouth 2 (Two) Times a Day. 180 tablet 3    amLODIPine (NORVASC) 5 MG tablet TAKE ONE TABLET BY MOUTH ONCE DAILY 90 tablet 3    Fluticasone-Umeclidin-Vilant (Trelegy Ellipta) 100-62.5-25 MCG/ACT inhaler Inhale 1 puff Daily. (Patient not taking: Reported on 5/23/2025) 28 each 0    metoprolol succinate XL (TOPROL-XL) 200 MG 24 hr tablet Take 1 tablet by mouth Daily. (Patient not taking: Reported on 5/23/2025)      Misc. Devices (Hand Held Shower Spray) misc Use 1 each 1 (One) Time for 1 dose. 1 each 0     No facility-administered medications prior to visit.     No opioid medication identified on active medication list. I have reviewed chart for other potential  high risk medication/s  and harmful drug interactions in the elderly.      Aspirin is on active medication list. Aspirin use is indicated based on review of current medical condition/s. Pros and cons of this therapy have been discussed today. Benefits of this medication outweigh potential harm.  Patient has been encouraged to continue taking this medication.  .      Patient Active Problem List   Diagnosis    Permanent atrial fibrillation    Essential hypertension    History of myocardial infarction    Acquired hypothyroidism    Depression with anxiety    Blepharospasm    Chronic diastolic (congestive) heart failure    Pulmonary hypertension    Gastroesophageal reflux disease without esophagitis    Major depression, recurrent, chronic    Generalized anxiety disorder    Chronic anticoagulation    Age-related osteoporosis without current pathological fracture    Moderate mitral regurgitation    Moderate tricuspid regurgitation    Cervical radiculopathy due to degenerative joint disease of spine    Tricompartment osteoarthritis of both knees    Spastic torticollis    Vertigo    Autonomic instability    Impaired mobility and ADLs    Choroidal neovascular membrane of right eye    Combined forms of age-related cataract of both eyes    Keratoconjunctivitis sicca of both eyes not specified as Sjogren's    Myopia of both eyes    Vitamin B12 deficiency    Sjogren's syndrome    Bilateral degenerative progressive high myopia    Exudative age-related macular degeneration of right eye with inactive choroidal neovascularization    Degenerative cervical spinal stenosis    Chronic venous insufficiency of lower extremity    Atrial fibrillation, persistent    Reactive airway disease     Advance Care Planning Advance Directive is not on file.  ACP discussion was held with the patient during this visit. Patient does not have an advance directive, information provided.      Review of Systems  Constitutional: Negative for fever. Negative for chills, diaphoresis;  "malaise/fatigue of chronic nature, no appreciable weight change of unintended nature.  HENT: No dysphagia; no changes to vision/hearing/smell/taste; no epistaxis  Eyes: Negative for redness and visual disturbance.   Respiratory: No hemoptysis; cough with clear/yellow phlegm at times.  Cardiovascular: Negative for chest pain; periodic symptomatic palpitations.  Gastrointestinal: Negative for abdominal distention, abdominal pain and blood in stool.   Endocrine: Negative for cold intolerance and heat intolerance.   Genitourinary: Negative for difficulty urinating, dysuria and frequency.   Musculoskeletal: Chronic arthralgias, back pain and myalgias.  Patient does report easy fatigability, at times.  Skin: No new rashes or lesions.  Neurological: No syncopal episodes.  Generalized weakness of chronic nature.  Hematological: Negative for adenopathy. Does not bruise/bleed easily.   Psychiatric/Behavioral: Negative for confusion. The patient is not nervous/anxious.      Objective   Vitals:    05/23/25 1058   BP: 100/40   Pulse: 80   SpO2: 95%  Comment: 2L 02   Weight: 89.8 kg (198 lb)   Height: 172.7 cm (68\")   PainSc: 0-No pain     General Appearance: alert, oriented x 3, no acute distress.  Pleasant and interactive during questioning/examination.  Skin: warm and dry.  Without jaundice.  HEENT: Atraumatic.  pupils round and reactive to light and accommodation, oral mucosa pink and moist.  Nares patent without epistaxis.  External auditory canals are patent tympanic membranes intact.  Neck: supple, no JVD, trachea midline.  No thyromegaly  Lungs: Audible air exchange noted all lung fields, unlabored breathing effort.  Prolonged expiratory phase bilaterally, rhonchus and referred upper airway congestion to bilateral lungs, cleared with light cough.  Heart: RRR, normal S1 and S2, no S3, no rub.  Chronic heart murmurs.  Abdomen: soft, non-tender, no palpable bladder, present bowel sounds to auscultation ×4.  No guarding or " "rigidity.  Extremities: no clubbing, cyanosis.  Good range of motion actively and passively.  Symmetric muscle strength and development.  Ambulates with rollator.  Chronic gravity dependent nonpitting edema of the lower extremities.  Neuro: normal speech and mental status.  Cranial nerves II through XII intact.  No anosmia. DTR 2+; proprioception intact.  No focal motor/sensory deficits.    Estimated body mass index is 30.11 kg/m² as calculated from the following:    Height as of this encounter: 172.7 cm (68\").    Weight as of this encounter: 89.8 kg (198 lb).                Does the patient have evidence of cognitive impairment? No                                                                                               Health  Risk Assessment    Smoking Status:  Social History     Tobacco Use   Smoking Status Never    Passive exposure: Never   Smokeless Tobacco Never     Alcohol Consumption:  Social History     Substance and Sexual Activity   Alcohol Use No       Fall Risk Screen  STEADI Fall Risk Assessment was completed, and patient is at HIGH risk for falls. Assessment completed on:5/23/2025    Depression Screening   Little interest or pleasure in doing things? Nearly every day   Feeling down, depressed, or hopeless? Nearly every day   PHQ-2 Total Score 6   Trouble falling or staying asleep, or sleeping too much? More than half the days   Feeling tired or having little energy? Nearly every day   Poor appetite or overeating? Not at all   Feeling bad about yourself - or that you are a failure or have let yourself or your family down? Not at all   Trouble concentrating on things, such as reading the newspaper or watching television? Nearly every day   Moving or speaking so slowly that other people could have noticed? Or the opposite - being so fidgety or restless that you have been moving around a lot more than usual? Not at all     Thoughts that you would be better off dead, or of hurting yourself in some " way? Not at all   PHQ-9 Total Score 14   If you checked off any problems, how difficult have these problems made it for you to do your work, take care of things at home, or get along with other people? Somewhat difficult        Health Habits and Functional and Cognitive Screenin/23/2025    11:04 AM   Functional & Cognitive Status   Are you deaf or do you have serious difficulty hearing?  Yes           Age-appropriate Screening Schedule:  Refer to the list below for future screening recommendations based on patient's age, sex and/or medical conditions. Orders for these recommended tests are listed in the plan section. The patient has been provided with a written plan.    Health Maintenance List  Health Maintenance   Topic Date Due    DXA SCAN  Never done    ZOSTER VACCINE (1 of 2) Never done    RSV Vaccine - Adults (1 - 1-dose 75+ series) Never done    COVID-19 Vaccine ( season) 2024    ANNUAL WELLNESS VISIT  2025    INFLUENZA VACCINE  2025    TDAP/TD VACCINES (3 - Td or Tdap) 2028    Pneumococcal Vaccine 50+  Completed    MAMMOGRAM  Discontinued    COLORECTAL CANCER SCREENING  Discontinued                                                                                                                                                CMS Preventative Services Quick Reference  Risk Factors Identified During Encounter  None Identified    The above risks/problems have been discussed with the patient.  Pertinent information has been shared with the patient in the After Visit Summary.  An After Visit Summary and PPPS were made available to the patient.    Follow Up:  Next Medicare Wellness visit to be scheduled in 1 year.     Assessment & Plan  Medicare annual wellness visit, subsequent       I have discussed age/gender specific preventative healthcare issues in detail with patient today.  I have answered all of the questions.  At high risk for falls         Chronic diastolic  (congestive) heart failure  Congestive heart failure due to coronary artery disease (CAD) and hypertension.  Heart failure is stable.  NYHA Class I.  Continue current treatment regimen.  Heart failure will be reassessed in 6 months.           Frequent weight evaluations to continue, avoid excessive sodium/salt dietary intake.  Demonstrates no findings of acute volume overload at this time.  Chronic venous insufficiency of lower extremity       Continue low-sodium/salt dietary intake.  Permanent atrial fibrillation    Orders:    TSH    T4, Free    Acquired hypothyroidism    Orders:    TSH    T4, Free  Thyroid function studies today, dose adjustment if indicated.  Essential hypertension  Hypertension is stable and controlled  Medication changes per orders.  Blood pressure will be reassessed in 6 months.    Orders:    TSH    T4, Free  I have recommended a reduction in metoprolol dosing due to her low diastolic blood pressure.  I have asked that she routinely monitor blood pressure and heart rate at home, keep a log of those results and bring with her to follow-up visit.  Impaired mobility and ADLs       Fall precautions in place.  Continue rollator use.       Follow Up:  No follow-ups on file.    I confirm accuracy of unchanged data/findings which have been carried forward from previous visit, as well as I have updated appropriately those that have changed.

## 2025-05-23 NOTE — ASSESSMENT & PLAN NOTE
Hypertension is stable and controlled  Medication changes per orders.  Blood pressure will be reassessed in 6 months.    Orders:    TSH    T4, Free  I have recommended a reduction in metoprolol dosing due to her low diastolic blood pressure.  I have asked that she routinely monitor blood pressure and heart rate at home, keep a log of those results and bring with her to follow-up visit.

## 2025-05-23 NOTE — PATIENT INSTRUCTIONS
Advance Care Planning and Advance Directives     You make decisions on a daily basis - decisions about where you want to live, your career, your home, your life. Perhaps one of the most important decisions you face is your choice for future medical care. Take time to talk with your family and your healthcare team and start planning today.  Advance Care Planning is a process that can help you:  Understand possible future healthcare decisions in light of your own experiences  Reflect on those decision in light of your goals and values  Discuss your decisions with those closest to you and the healthcare professionals that care for you  Make a plan by creating a document that reflects your wishes    Surrogate Decision Maker  In the event of a medical emergency, which has left you unable to communicate or to make your own decisions, you would need someone to make decisions for you.  It is important to discuss your preferences for medical treatment with this person while you are in good health.     Qualities of a surrogate decision maker:  Willing to take on this role and responsibility  Knows what you want for future medical care  Willing to follow your wishes even if they don't agree with them  Able to make difficult medical decisions under stressful circumstances    Advance Directives  These are legal documents you can create that will guide your healthcare team and decision maker(s) when needed. These documents can be stored in the electronic medical record.    Living Will - a legal document to guide your care if you have a terminal condition or a serious illness and are unable to communicate. States vary by statute in document names/types, but most forms may include one or more of the following:        -  Directions regarding life-prolonging treatments        -  Directions regarding artificially provided nutrition/hydration        -  Choosing a healthcare decision maker        -  Direction regarding organ/tissue  donation    Durable Power of  for Healthcare - this document names an -in-fact to make medical decisions for you, but it may also allow this person to make personal and financial decisions for you. Please seek the advice of an  if you need this type of document.    **Advance Directives are not required and no one may discriminate against you if you do not sign one.    Medical Orders  Many states allow specific forms/orders signed by your physician to record your wishes for medical treatment in your current state of health. This form, signed in personal communication with your physician, addresses resuscitation and other medical interventions that you may or may not want.      For more information or to schedule a time with a [facility] Advance Care Planning Facilitator contact: [facility] or call [phone] and someone will contact you directly.  Fall Prevention in the Home, Adult  Falls can cause injuries and affect people of all ages. There are many simple things that you can do to make your home safe and to help prevent falls.  If you need it, ask for help making these changes.  What actions can I take to prevent falls?  General information  Use good lighting in all rooms. Make sure to:  Replace any light bulbs that burn out.  Turn on lights if it is dark and use night-lights.  Keep items that you use often in easy-to-reach places. Lower the shelves around your home if needed.  Move furniture so that there are clear paths around it.  Do not keep throw rugs or other things on the floor that can make you trip.  If any of your floors are uneven, fix them.  Add color or contrast paint or tape to clearly jelly and help you see:  Grab bars or handrails.  First and last steps of staircases.  Where the edge of each step is.  If you use a ladder or stepladder:  Make sure that it is fully opened. Do not climb a closed ladder.  Make sure the sides of the ladder are locked in  place.  Have someone hold the ladder while you use it.  Know where your pets are as you move through your home.  What can I do in the bathroom?         Keep the floor dry. Clean up any water that is on the floor right away.  Remove soap buildup in the bathtub or shower. Buildup makes bathtubs and showers slippery.  Use non-skid mats or decals on the floor of the bathtub or shower.  Attach bath mats securely with double-sided, non-slip rug tape.  If you need to sit down while you are in the shower, use a non-slip stool.  Install grab bars by the toilet and in the bathtub and shower. Do not use towel bars as grab bars.  What can I do in the bedroom?  Make sure that you have a light by your bed that is easy to reach.  Do not use any sheets or blankets on your bed that hang to the floor.  Have a firm bench or chair with side arms that you can use for support when you get dressed.  What can I do in the kitchen?  Clean up any spills right away.  If you need to reach something above you, use a sturdy step stool that has a grab bar.  Keep electrical cables out of the way.  Do not use floor polish or wax that makes floors slippery.  What can I do with my stairs?  Do not leave anything on the stairs.  Make sure that you have a light switch at the top and the bottom of the stairs. Have them installed if you do not have them.  Make sure that there are handrails on both sides of the stairs. Fix handrails that are broken or loose. Make sure that handrails are as long as the staircases.  Install non-slip stair treads on all stairs in your home if they do not have carpet.  Avoid having throw rugs at the top or bottom of stairs, or secure the rugs with carpet tape to prevent them from moving.  Choose a carpet design that does not hide the edge of steps on the stairs. Make sure that carpet is firmly attached to the stairs. Fix any carpet that is loose or worn.  What can I do on the outside of my home?  Use bright outdoor  lighting.  Repair the edges of walkways and driveways and fix any cracks. Clear paths of anything that can make you trip, such as tools or rocks.  Add color or contrast paint or tape to clearly jelly and help you see high doorway thresholds.  Trim any bushes or trees on the main path into your home.  Check that handrails are securely fastened and in good repair. Both sides of all steps should have handrails.  Install guardrails along the edges of any raised decks or porches.  Have leaves, snow, and ice cleared regularly. Use sand, salt, or ice melt on walkways during winter months if you live where there is ice and snow.  In the garage, clean up any spills right away, including grease or oil spills.  What other actions can I take?  Review your medicines with your health care provider. Some medicines can make you confused or feel dizzy. This can increase your chance of falling.  Wear closed-toe shoes that fit well and support your feet. Wear shoes that have rubber soles and low heels.  Use a cane, walker, scooter, or crutches that help you move around if needed.  Talk with your provider about other ways that you can decrease your risk of falls. This may include seeing a physical therapist to learn to do exercises to improve movement and strength.  Where to find more information  Centers for Disease Control and PreventionDENIZ: cdc.gov  [place] on Aging: tres.nih.gov  [place] on Aging: tres.nih.gov  Contact a health care provider if:  You are afraid of falling at home.  You feel weak, drowsy, or dizzy at home.  You fall at home.  Get help right away if you:  Lose consciousness or have trouble moving after a fall.  Have a fall that causes a head injury.  These symptoms may be an emergency. Get help right away. Call 911.  Do not wait to see if the symptoms will go away.  Do not drive yourself to the hospital.  This information is not intended to replace advice given to you by your health care  provider. Make sure you discuss any questions you have with your health care provider.  Document Revised: 08/21/2023 Document Reviewed: 08/21/2023  Elsevier Patient Education © 2024 [employer]  Sit-to-Stand Exercise    The sit-to-stand exercise (also known as the chair stand or chair rise exercise) strengthens your lower body and helps you maintain or improve your mobility and independence. The end goal is to do the sit-to-stand exercise without using your hands. This will be easier as you become stronger. You should always talk with your health care provider before starting any exercise program, especially if you have had recent surgery.  Do the exercise exactly as told by your health care provider and adjust it as directed. It is normal to feel mild stretching, pulling, tightness, or discomfort as you do this exercise, but you should stop right away if you feel sudden pain or your pain gets worse. Do not begin doing this exercise until told by your health care provider.  What the sit-to-stand exercise does  The sit-to-stand exercise helps to strengthen the muscles in your thighs and the muscles in the center of your body that give you stability (core muscles). This exercise is especially helpful if:  You have had knee or hip surgery.  You have trouble getting up from a chair, out of a car, or off the toilet due to muscle weakness.  How to do the sit-to-stand exercise  Sit toward the front edge of a sturdy chair without armrests. Your knees should be bent and your feet should be flat on the floor and shoulder-width apart and underneath your hips.  Place your hands lightly on each side of the seat. Keep your back and neck as straight as possible, with your chest slightly forward.  Breathe in slowly. Lean forward and slightly shift your weight to the front of your feet.  Breathe out as you slowly stand up. Try not to support any weight with your hands.  Stand and pause for a full breath in and out.  Breathe in as  you sit down slowly. Tighten your core and abdominal muscles to control your lowering as much as possible. You should lower yourself back to the chair slowly, not just drop back into the seat.  Breathe out slowly.  Do this exercise 10-15 times. If needed, do it fewer times until you build up strength.  Rest for 1 minute, then do another set of 10-15 repetitions.  To change the difficulty of the sit-to-stand exercise  If the exercise is too difficult, use a chair with sturdy armrests, and push off the armrests to help you come to the standing position. You can also use the armrests to help slowly lower yourself back to sitting. As this gets easier, try to use your arms less. You can also place a firm cushion or pillow on the chair to make the surface higher.  If this exercise is too easy, do not use your arms to help raise or lower yourself. You can also wear a weighted vest, use hand weights, increase your repetitions, or try a lower chair.  General tips  You may feel tired when starting an exercise routine. This is normal.  You may have muscle soreness that lasts a few days. This is normal. As you get stronger, you may not feel muscle soreness.  Use smooth, steady movements.  Do not  hold your breath during strength exercises. This can cause unsafe changes in your blood pressure.  Breathe in slowly through your nose, and breathe out slowly through your mouth.  Summary  Strengthening your lower body is an important step to help you move safely and independently.  The sit-to-stand exercise helps strengthen the muscles in your thighs and core.  You should always talk with your health care provider before starting any exercise program, especially if you have had recent surgery.  This information is not intended to replace advice given to you by your health care provider. Make sure you discuss any questions you have with your health care provider.  Document Revised: 04/10/2022 Document Reviewed: 04/10/2022  [employer]  Patient Education 2024 [employer]vier Inc.    Medicare Wellness  Personal Prevention Plan of Service     Date of Office Visit:    Encounter Provider:  Rajan Luis DO  Place of Service:  [facility]  Patient Name: Millie Richardson  :  1945    As part of the Medicare Wellness portion of your visit today, we are providing you with this personalized preventive plan of services (PPPS). This plan is based upon recommendations of the [place] Preventive Services Task Force (USPSTF) and the Advisory Committee on Immunization Practices (ACIP).    This lists the preventive care services that should be considered, and provides dates of when you are due. Items listed as completed are up-to-date and do not require any further intervention.    Health Maintenance   Topic Date Due   • DXA SCAN  Never done   • ZOSTER VACCINE (1 of 2) Never done   • RSV Vaccine - Adults (1 - 1-dose 75+ series) Never done   • COVID-19 Vaccine ( - - season) 2024   • ANNUAL WELLNESS VISIT  2025   • INFLUENZA VACCINE  2025   • TDAP/TD VACCINES (3 - Td or Tdap) 2028   • Pneumococcal Vaccine 50+  Completed   • MAMMOGRAM  Discontinued   • COLORECTAL CANCER SCREENING  Discontinued       No orders of the defined types were placed in this encounter.      No follow-ups on file.

## 2025-06-02 ENCOUNTER — TELEPHONE (OUTPATIENT)
Age: 80
End: 2025-06-02
Payer: MEDICARE

## 2025-06-02 NOTE — TELEPHONE ENCOUNTER
FIFI WITH [facility] NEEDS TO KNOW THE MEDICATIONS THAT IVY IS CURRENTLY TAKING.    PLEASE FAX A LIST OF HER MEDICATIONS - TO MAKE SURE THEY HAVE WHAT THEY ARE SUPPOSED TO BE GIVING HER    ALSO NEEDS VERBAL ORDERS TO RECERT HER FOR 4 WEEKS.

## 2025-06-13 ENCOUNTER — TELEPHONE (OUTPATIENT)
Age: 80
End: 2025-06-13

## 2025-06-13 NOTE — TELEPHONE ENCOUNTER
Caller: Millie Richardson    Relationship: Self    Best call back number: [phone]    What orders are you requesting (i.e. lab or imaging):     LABS     Additional notes:     PATIENT STATED SHE WAS SUPPOSED TO STOP TAKING ONE OF HER BLOOD PRESSURE MEDICATIONS AND GET BLOOD DRAWN BEFORE AND AFTER SHE STOPS THE MEDICATION.  SHE IS NOT SURE WHICH MEDICATION SHE IS SUPPOSED TO STOP OR WHEN TO COME AND GET BLOOD DRAWN    PLEASE CALL PATIENT

## 2025-06-17 DIAGNOSIS — I48.19 OTHER PERSISTENT ATRIAL FIBRILLATION: ICD-10-CM

## 2025-06-17 RX ORDER — METOPROLOL SUCCINATE 200 MG/1
200 TABLET, EXTENDED RELEASE ORAL DAILY
Qty: 50 TABLET | Refills: 0 | OUTPATIENT
Start: 2025-06-17

## 2025-06-19 ENCOUNTER — OUTSIDE FACILITY SERVICE (OUTPATIENT)
Age: 80
End: 2025-06-19
Payer: MEDICARE

## 2025-06-19 DIAGNOSIS — I10 ESSENTIAL HYPERTENSION: ICD-10-CM

## 2025-06-19 DIAGNOSIS — I48.21 PERMANENT ATRIAL FIBRILLATION: ICD-10-CM

## 2025-06-19 PROCEDURE — OUTSIDEPOS PR OUTSIDE POS PLACEHOLDER: Performed by: FAMILY MEDICINE

## 2025-06-19 RX ORDER — METOPROLOL SUCCINATE 50 MG/1
50 TABLET, EXTENDED RELEASE ORAL 2 TIMES DAILY
Qty: 60 TABLET | Refills: 0 | Status: SHIPPED | OUTPATIENT
Start: 2025-06-19

## 2025-06-19 NOTE — TELEPHONE ENCOUNTER
Rx Refill Note  Requested Prescriptions     Pending Prescriptions Disp Refills    metoprolol succinate XL (Toprol XL) 50 MG 24 hr tablet 60 tablet 0     Sig: Take 1 tablet by mouth 2 (Two) Times a Day.      Last office visit with prescribing clinician: 5/23/2025   Last telemedicine visit with prescribing clinician: Visit date not found   Next office visit with prescribing clinician: 6/24/2025                         Would you like a call back once the refill request has been completed: [] Yes [] No    If the office needs to give you a call back, can they leave a voicemail: [] Yes [] No    Astrid Roblero MA  06/19/25, 15:44 EDT

## 2025-06-24 ENCOUNTER — TELEPHONE (OUTPATIENT)
Age: 80
End: 2025-06-24

## 2025-06-24 NOTE — TELEPHONE ENCOUNTER
Pt called and told me she was out of breathe and could not attend her appointment today. Also said her blood pressure likes to bottom out. Informed the pt she needs to be seen by the ER and the pt said she may call 911 to get a ambulance to taker her.

## 2025-06-27 ENCOUNTER — TELEPHONE (OUTPATIENT)
Age: 80
End: 2025-06-27
Payer: MEDICARE

## 2025-06-27 NOTE — TELEPHONE ENCOUNTER
Caller: Millie Richardson    Relationship: Self    Best call back number: [phone]    What is the medical concern/diagnosis:     TOENAIL ISSUE    What specialty or service is being requested:     PODIATRIST     Any additional details:     PLEASE CALL PATIENT WITH APPOINTMENT DETAILS    NEEDS AFTERNOON APPOINTMENT.  THAT IS WHEN SHE CAN GET TRANSPORTATION

## 2025-07-02 ENCOUNTER — TELEPHONE (OUTPATIENT)
Age: 80
End: 2025-07-02
Payer: MEDICARE

## 2025-07-02 NOTE — TELEPHONE ENCOUNTER
Pt's caregiver called in regards to pt's medication list. She is needing to know which medication Dr. Luis  has discontinued. She also stated that the pt has a fall and bruised her head and arm. Please advise.

## 2025-07-17 ENCOUNTER — TELEPHONE (OUTPATIENT)
Age: 80
End: 2025-07-17

## 2025-07-23 DIAGNOSIS — B35.1 ONYCHOMYCOSIS: Primary | ICD-10-CM

## 2025-07-25 ENCOUNTER — OFFICE VISIT (OUTPATIENT)
Age: 80
End: 2025-07-25
Payer: MEDICARE

## 2025-07-25 VITALS
DIASTOLIC BLOOD PRESSURE: 70 MMHG | SYSTOLIC BLOOD PRESSURE: 104 MMHG | WEIGHT: 188 LBS | HEART RATE: 56 BPM | OXYGEN SATURATION: 95 % | BODY MASS INDEX: 28.49 KG/M2 | HEIGHT: 68 IN

## 2025-07-25 DIAGNOSIS — I10 ESSENTIAL HYPERTENSION: ICD-10-CM

## 2025-07-25 DIAGNOSIS — Z79.01 CHRONIC ANTICOAGULATION: ICD-10-CM

## 2025-07-25 DIAGNOSIS — I48.21 PERMANENT ATRIAL FIBRILLATION: Primary | ICD-10-CM

## 2025-07-25 RX ORDER — LISINOPRIL 20 MG/1
20 TABLET ORAL DAILY
Qty: 90 TABLET | Refills: 2 | Status: SHIPPED | OUTPATIENT
Start: 2025-07-25

## 2025-07-25 RX ORDER — METOPROLOL SUCCINATE 25 MG/1
25 TABLET, EXTENDED RELEASE ORAL 2 TIMES DAILY
Qty: 90 TABLET | Refills: 2 | Status: SHIPPED | OUTPATIENT
Start: 2025-07-25

## 2025-08-21 ENCOUNTER — OUTSIDE FACILITY SERVICE (OUTPATIENT)
Age: 80
End: 2025-08-21
Payer: MEDICARE

## 2025-08-26 ENCOUNTER — TELEPHONE (OUTPATIENT)
Age: 80
End: 2025-08-26
Payer: MEDICARE

## 2025-08-26 DIAGNOSIS — K21.9 GASTROESOPHAGEAL REFLUX DISEASE WITHOUT ESOPHAGITIS: Chronic | ICD-10-CM

## 2025-08-26 RX ORDER — PANTOPRAZOLE SODIUM 40 MG/1
40 TABLET, DELAYED RELEASE ORAL DAILY
Qty: 90 TABLET | Refills: 2 | Status: SHIPPED | OUTPATIENT
Start: 2025-08-26

## (undated) DEVICE — THE MONARCH® "D" CARTRIDGE IS A SINGLE-USE POLYPROPYLENE CARTRIDGE FOR POSTERIOR CHAMBER IOL DELIVERY: Brand: MONARCH® III

## (undated) DEVICE — Device

## (undated) DEVICE — SINGLE USE MEDICAL DEVICE FOR OPHTHALMIC SURGERY: Brand: 23G IRR HANDPIECE SMOOTH 12B

## (undated) DEVICE — SINGLE USE MEDICAL DEVICE FOR OPHTHALMIC SURGERY: Brand: 23G ASP HANDPIECE ROUGH 12/BOX

## (undated) DEVICE — CANN HYDRODISSECTION

## (undated) DEVICE — SOL IRRIG H2O 1000ML STRL

## (undated) DEVICE — CANN IRR/INJ AIR ANT CHAMBER 6MM BEND 27G

## (undated) DEVICE — 0.8MM CLEARPORT PARA KNIFE: Brand: SHARPOINT

## (undated) DEVICE — Device: Brand: MALYUGIN RING SYSTEM 7.0MM

## (undated) DEVICE — EYE CARE POST OP KIT: Brand: MEDLINE INDUSTRIES, INC.

## (undated) DEVICE — DRP SUP TRIDENT FACE

## (undated) DEVICE — GLV SURG BIOGEL M LTX PF 6 1/2

## (undated) DEVICE — CLEAR CORNEAL KNIFE 2.4MM ANG: Brand: SHARPOINT

## (undated) DEVICE — TOWEL,OR,DSP,ST,BLUE,STD,4/PK,20PK/CS: Brand: MEDLINE